# Patient Record
Sex: MALE | Race: WHITE | NOT HISPANIC OR LATINO | Employment: PART TIME | URBAN - NONMETROPOLITAN AREA
[De-identification: names, ages, dates, MRNs, and addresses within clinical notes are randomized per-mention and may not be internally consistent; named-entity substitution may affect disease eponyms.]

---

## 2023-10-12 ENCOUNTER — APPOINTMENT (OUTPATIENT)
Dept: PRIMARY CARE | Facility: CLINIC | Age: 73
End: 2023-10-12
Payer: MEDICARE

## 2023-10-16 ENCOUNTER — OFFICE VISIT (OUTPATIENT)
Dept: PRIMARY CARE | Facility: CLINIC | Age: 73
End: 2023-10-16
Payer: MEDICARE

## 2023-10-16 VITALS
HEIGHT: 67 IN | OXYGEN SATURATION: 98 % | WEIGHT: 212 LBS | SYSTOLIC BLOOD PRESSURE: 106 MMHG | BODY MASS INDEX: 33.27 KG/M2 | HEART RATE: 64 BPM | DIASTOLIC BLOOD PRESSURE: 64 MMHG

## 2023-10-16 DIAGNOSIS — E55.9 VITAMIN D DEFICIENCY: ICD-10-CM

## 2023-10-16 DIAGNOSIS — E78.00 HYPERCHOLESTEROLEMIA: Primary | ICD-10-CM

## 2023-10-16 DIAGNOSIS — J30.9 ALLERGIC RHINITIS, UNSPECIFIED SEASONALITY, UNSPECIFIED TRIGGER: ICD-10-CM

## 2023-10-16 DIAGNOSIS — R35.1 NOCTURIA: ICD-10-CM

## 2023-10-16 DIAGNOSIS — J01.01 ACUTE RECURRENT MAXILLARY SINUSITIS: ICD-10-CM

## 2023-10-16 DIAGNOSIS — D23.5 DERMAL NEVUS OF CHEST: ICD-10-CM

## 2023-10-16 PROBLEM — M51.36 LUMBAR DEGENERATIVE DISC DISEASE: Status: RESOLVED | Noted: 2023-10-16 | Resolved: 2023-10-16

## 2023-10-16 PROBLEM — N45.1 EPIDIDYMITIS, BILATERAL: Status: RESOLVED | Noted: 2023-10-16 | Resolved: 2023-10-16

## 2023-10-16 PROBLEM — M85.88 OSTEOPENIA OF LUMBAR SPINE: Status: ACTIVE | Noted: 2023-10-16

## 2023-10-16 PROBLEM — I80.3 PHLEBITIS OF LEG: Status: RESOLVED | Noted: 2023-10-16 | Resolved: 2023-10-16

## 2023-10-16 PROBLEM — B35.3 TINEA PEDIS OF BOTH FEET: Status: RESOLVED | Noted: 2023-10-16 | Resolved: 2023-10-16

## 2023-10-16 PROBLEM — J01.00 ACUTE MAXILLARY SINUSITIS: Status: ACTIVE | Noted: 2023-10-16

## 2023-10-16 PROBLEM — B35.4 TINEA CORPORIS: Status: RESOLVED | Noted: 2023-10-16 | Resolved: 2023-10-16

## 2023-10-16 PROBLEM — M51.369 LUMBAR DEGENERATIVE DISC DISEASE: Status: RESOLVED | Noted: 2023-10-16 | Resolved: 2023-10-16

## 2023-10-16 PROBLEM — K57.30 DIVERTICULOSIS, SIGMOID: Status: ACTIVE | Noted: 2023-10-16

## 2023-10-16 PROBLEM — B00.1 RECURRENT COLD SORES: Status: ACTIVE | Noted: 2023-10-16

## 2023-10-16 LAB
25(OH)D3 SERPL-MCNC: 41 NG/ML (ref 30–100)
CHOLEST SERPL-MCNC: 242 MG/DL (ref 0–199)
CHOLESTEROL/HDL RATIO: 6
HDLC SERPL-MCNC: 40.3 MG/DL
LDLC SERPL CALC-MCNC: 161 MG/DL
NON HDL CHOLESTEROL: 202 MG/DL (ref 0–149)
PSA SERPL-MCNC: 1.05 NG/ML
TRIGL SERPL-MCNC: 202 MG/DL (ref 0–149)
VLDL: 40 MG/DL (ref 0–40)

## 2023-10-16 PROCEDURE — 1170F FXNL STATUS ASSESSED: CPT | Performed by: FAMILY MEDICINE

## 2023-10-16 PROCEDURE — G0008 ADMIN INFLUENZA VIRUS VAC: HCPCS | Performed by: FAMILY MEDICINE

## 2023-10-16 PROCEDURE — 90662 IIV NO PRSV INCREASED AG IM: CPT | Performed by: FAMILY MEDICINE

## 2023-10-16 PROCEDURE — 1160F RVW MEDS BY RX/DR IN RCRD: CPT | Performed by: FAMILY MEDICINE

## 2023-10-16 PROCEDURE — 1036F TOBACCO NON-USER: CPT | Performed by: FAMILY MEDICINE

## 2023-10-16 PROCEDURE — 99213 OFFICE O/P EST LOW 20 MIN: CPT | Performed by: FAMILY MEDICINE

## 2023-10-16 PROCEDURE — 36415 COLL VENOUS BLD VENIPUNCTURE: CPT

## 2023-10-16 PROCEDURE — 1159F MED LIST DOCD IN RCRD: CPT | Performed by: FAMILY MEDICINE

## 2023-10-16 PROCEDURE — 84153 ASSAY OF PSA TOTAL: CPT

## 2023-10-16 PROCEDURE — G0439 PPPS, SUBSEQ VISIT: HCPCS | Performed by: FAMILY MEDICINE

## 2023-10-16 PROCEDURE — 80061 LIPID PANEL: CPT

## 2023-10-16 PROCEDURE — 82306 VITAMIN D 25 HYDROXY: CPT

## 2023-10-16 RX ORDER — AMOXICILLIN AND CLAVULANATE POTASSIUM 875; 125 MG/1; MG/1
875 TABLET, FILM COATED ORAL 2 TIMES DAILY
Qty: 14 TABLET | Refills: 0 | Status: SHIPPED | OUTPATIENT
Start: 2023-10-16 | End: 2023-10-23

## 2023-10-16 RX ORDER — ACETAMINOPHEN 500 MG
1 TABLET ORAL DAILY
COMMUNITY
Start: 2018-02-09

## 2023-10-16 ASSESSMENT — ENCOUNTER SYMPTOMS
ARTHRALGIAS: 0
ACTIVITY CHANGE: 0
RHINORRHEA: 1
DYSURIA: 0
WHEEZING: 0
BACK PAIN: 0
NAUSEA: 0
ABDOMINAL DISTENTION: 0
SINUS PRESSURE: 1
FREQUENCY: 1
FEVER: 0
SHORTNESS OF BREATH: 0
CONSTIPATION: 0
APNEA: 0
DIFFICULTY URINATING: 0
DIARRHEA: 0
APPETITE CHANGE: 0
CHEST TIGHTNESS: 0
COLOR CHANGE: 0
PALPITATIONS: 0

## 2023-10-16 ASSESSMENT — ACTIVITIES OF DAILY LIVING (ADL)
GROCERY_SHOPPING: INDEPENDENT
TAKING_MEDICATION: INDEPENDENT
DRESSING: INDEPENDENT
MANAGING_FINANCES: INDEPENDENT
DOING_HOUSEWORK: INDEPENDENT
BATHING: INDEPENDENT

## 2023-10-16 ASSESSMENT — PATIENT HEALTH QUESTIONNAIRE - PHQ9
1. LITTLE INTEREST OR PLEASURE IN DOING THINGS: NOT AT ALL
2. FEELING DOWN, DEPRESSED OR HOPELESS: NOT AT ALL
SUM OF ALL RESPONSES TO PHQ9 QUESTIONS 1 AND 2: 0

## 2023-10-16 NOTE — PROGRESS NOTES
"Subjective   Reason for Visit: Yosvany Chase is an 72 y.o. male here for a Medicare Wellness visit.     Past Medical, Surgical, and Family History reviewed and updated in chart.    Reviewed all medications by prescribing practitioner or clinical pharmacist (such as prescriptions, OTCs, herbal therapies and supplements) and documented in the medical record.    HPI  Congestion nose throat 6 weeks  Was ill  Not SOB  Has HX sinusitis sinus issues in past  Has had allergies along time   Sl cough   No sore throat or laryngitis    Patient Care Team:  Erik Jolly DO as PCP - General  Erik Jolly DO as PCP - [employer] PCP     Review of Systems   Constitutional:  Negative for activity change, appetite change and fever.   HENT:  Positive for congestion, postnasal drip, rhinorrhea and sinus pressure. Negative for sneezing.    Eyes:  Negative for visual disturbance.   Respiratory:  Negative for apnea, chest tightness, shortness of breath and wheezing.    Cardiovascular:  Negative for chest pain, palpitations and leg swelling.   Gastrointestinal:  Negative for abdominal distention, constipation, diarrhea and nausea.   Genitourinary:  Positive for frequency. Negative for difficulty urinating, dysuria and urgency.   Musculoskeletal:  Negative for arthralgias, back pain and gait problem.   Skin:  Negative for color change, pallor and rash.       Objective   Vitals:  /64   Pulse 64   Ht 1.702 m (5' 7\")   Wt 96.2 kg (212 lb)   SpO2 98%   BMI 33.20 kg/m²       Physical Exam  General: alert, no apparent distress, good hygiene   HEAD:  Normocephalic, atraumatic    EARS:  EAC patent, TMs normal,   EYES:  sclera white, MIKHAIL, conjunctiva noninjected  NOSE: Nasal passages patent   MOUTH: Pharynx clear, tongue uvula midline, grade 3 airway, good dentition  Neck:  supple, no masses, thyroid non enlarged non nodular, no cervical adenopathy,  Lungs:  no wheezing, no rales , no rhonchi, normal respiratory " pattern, breath sounds not diminished  Heart:  regular rate and  rhythm, no murmur, no ectopy, no S3 or S4, no carotid bruits  Abdomen:  soft NT,BS + ,  no organomegaly, no masses, no bruits  Extremities:  1+ edema, no cyanosis, no clubbing,  2+ posterior tibialis pulse    Psych:  speech fluent, normal affect, normal thought process  Skin:  no rashes, no concerning skin lesions, normal texture, patient with small tan nevus slightly left of midline upper chest approximately 6 mm x 4 mm smooth edge tan homogeneous color not concerning appearing.     Assessment/Plan   Problem List Items Addressed This Visit    None  Patient will have shingles shot at pharmacy after leaving office.  Had flu shot done.  Has had pneumonia vaccine in the past.  Antibiotic for sinusitis prescribed  Courage use of Flonase and azelastine nasal spray.  Both over-the-counter.  Patient does have some mild BPH symptoms AUA score of 4

## 2023-10-18 DIAGNOSIS — E78.00 HYPERCHOLESTEROLEMIA: Primary | ICD-10-CM

## 2024-03-11 ENCOUNTER — OFFICE VISIT (OUTPATIENT)
Dept: PRIMARY CARE | Facility: CLINIC | Age: 74
End: 2024-03-11
Payer: COMMERCIAL

## 2024-03-11 VITALS
SYSTOLIC BLOOD PRESSURE: 112 MMHG | DIASTOLIC BLOOD PRESSURE: 68 MMHG | OXYGEN SATURATION: 96 % | HEART RATE: 73 BPM | BODY MASS INDEX: 32.73 KG/M2 | WEIGHT: 209 LBS

## 2024-03-11 DIAGNOSIS — B35.6 TINEA CRURIS: Primary | ICD-10-CM

## 2024-03-11 DIAGNOSIS — R73.9 ELEVATED BLOOD SUGAR: ICD-10-CM

## 2024-03-11 PROBLEM — J01.00 ACUTE MAXILLARY SINUSITIS: Status: RESOLVED | Noted: 2023-10-16 | Resolved: 2024-03-11

## 2024-03-11 LAB
ANION GAP SERPL CALC-SCNC: 12 MMOL/L (ref 10–20)
BUN SERPL-MCNC: 20 MG/DL (ref 6–23)
CALCIUM SERPL-MCNC: 8.9 MG/DL (ref 8.6–10.3)
CHLORIDE SERPL-SCNC: 105 MMOL/L (ref 98–107)
CO2 SERPL-SCNC: 25 MMOL/L (ref 21–32)
CREAT SERPL-MCNC: 0.85 MG/DL (ref 0.5–1.3)
EGFRCR SERPLBLD CKD-EPI 2021: >90 ML/MIN/1.73M*2
GLUCOSE SERPL-MCNC: 103 MG/DL (ref 74–99)
POTASSIUM SERPL-SCNC: 3.8 MMOL/L (ref 3.5–5.3)
SODIUM SERPL-SCNC: 138 MMOL/L (ref 136–145)

## 2024-03-11 PROCEDURE — 36415 COLL VENOUS BLD VENIPUNCTURE: CPT

## 2024-03-11 PROCEDURE — 1036F TOBACCO NON-USER: CPT | Performed by: FAMILY MEDICINE

## 2024-03-11 PROCEDURE — 80048 BASIC METABOLIC PNL TOTAL CA: CPT

## 2024-03-11 PROCEDURE — 99213 OFFICE O/P EST LOW 20 MIN: CPT | Performed by: FAMILY MEDICINE

## 2024-03-11 PROCEDURE — 83036 HEMOGLOBIN GLYCOSYLATED A1C: CPT

## 2024-03-11 PROCEDURE — 1159F MED LIST DOCD IN RCRD: CPT | Performed by: FAMILY MEDICINE

## 2024-03-11 PROCEDURE — 1160F RVW MEDS BY RX/DR IN RCRD: CPT | Performed by: FAMILY MEDICINE

## 2024-03-11 RX ORDER — KETOCONAZOLE 20 MG/G
CREAM TOPICAL
Qty: 30 G | Refills: 3 | Status: SHIPPED | OUTPATIENT
Start: 2024-03-11

## 2024-03-11 RX ORDER — MULTIVIT-MIN/IRON FUM/FOLIC AC 7.5 MG-4
1 TABLET ORAL DAILY
COMMUNITY

## 2024-03-11 NOTE — PATIENT INSTRUCTIONS
The leg could be arthritis in the knee -   Or the varicose veins  -   Exercise helps -   Consider support stockings -   I do not see signs of a clot       Call [phone] to set up CT of the heart       For the toe - if red and raw and itchy - athletes foot cream  Of hard skin -  use vaseline often       For the groin - use the ketoconazole when flared up to calm it down -   And then use the Gold bond powder often to keep it dry       Try a fiber supplement for the bowels  - Metamucil or Citrucel       I will mail you your test results  -    Or call if urgent issues     Medicare Wellness due in October -   Be sure to call at least 2 months in advance

## 2024-03-11 NOTE — PROGRESS NOTES
Subjective   Patient ID: Yosvany Chase is a 73 y.o. male who presents for Leg Pain (Left knee and pain would radiate down and sometimes radiate up, concerned about a clot? Corn on left small toe.  Can't seem to complete his bowel movements, rash in groin area in the warmer months. Dryness in his sinuses, spots that just appear on his skin in places.).    HPI     Former [facility] pt -     LOV With him was 10/2023 for M[facility]  wellness       Concerns today -     About a month ago - had an issue with his leg a few weeks  No issues walking  - can walk here from home  Was only concerned about if he was having a blood clot       Wondered about getting a coronary CT scan    - has order    Corn on his right toe gets worse every Spring    Rash in his groin off and on  - can control it  - but it keeps coming back     Has not had BS checked in a while     Bowels - not as clean as a BM as he would like -   No incon  Colonoscopy 2018 -WNL     Semi-retired from [employer]      Review of Systems    Objective   /68 (BP Location: Right arm, Patient Position: Sitting, BP Cuff Size: Large adult)   Pulse 73   Wt 94.8 kg (209 lb)   SpO2 96%   BMI 32.73 kg/m²     Physical Exam  Vitals reviewed.   Constitutional:       Appearance: Normal appearance.   Cardiovascular:      Rate and Rhythm: Normal rate and regular rhythm.      Heart sounds: No murmur heard.     No gallop.   Pulmonary:      Effort: Pulmonary effort is normal.      Breath sounds: Normal breath sounds.   Musculoskeletal:         General: Swelling present.      Comments: Non pitting edema of the legs -     Does have a very soft calf       No pain to palpation at all of knee or leg     Does have prom veins posteriorly    Skin:     Comments: Right 5th toe with mild erythema medially    Neurological:      Mental Status: He is alert.         Assessment/Plan   Problem List Items Addressed This Visit    None  Visit Diagnoses         Codes    Tinea cruris    -  Primary B35.6     Relevant Medications    ketoconazole (NIZOral) 2 % cream    Elevated blood sugar     R73.9    Relevant Orders    Basic Metabolic Panel    Hemoglobin A1C          Discussed the recurrent rashes -   Check on sugars   Ketoconazole prn and discussed keeping   Groin dry     Discussed the leg - fine now - may be OA knee or the veins - discussed support stockings -   Discussed no current signs of DVT     Bowels  - try fiber

## 2024-03-12 LAB
EST. AVERAGE GLUCOSE BLD GHB EST-MCNC: 100 MG/DL
HBA1C MFR BLD: 5.1 %

## 2024-05-09 ENCOUNTER — TELEPHONE (OUTPATIENT)
Dept: PRIMARY CARE | Facility: CLINIC | Age: 74
End: 2024-05-09
Payer: COMMERCIAL

## 2024-05-09 NOTE — TELEPHONE ENCOUNTER
I saw you mid march and we did talk about this knee pain I was having.  It is still quite bothersome and it is more so right at the knee than it was when I talked about it in March.  Do I come see you about this?  Should I just go see a specialist? Let me know.

## 2024-05-31 ENCOUNTER — OFFICE VISIT (OUTPATIENT)
Dept: ORTHOPEDIC SURGERY | Facility: CLINIC | Age: 74
End: 2024-05-31
Payer: COMMERCIAL

## 2024-05-31 ENCOUNTER — HOSPITAL ENCOUNTER (OUTPATIENT)
Dept: RADIOLOGY | Facility: HOSPITAL | Age: 74
Discharge: HOME | End: 2024-05-31
Payer: COMMERCIAL

## 2024-05-31 DIAGNOSIS — M25.561 ACUTE BILATERAL KNEE PAIN: ICD-10-CM

## 2024-05-31 DIAGNOSIS — M25.562 ACUTE BILATERAL KNEE PAIN: Primary | ICD-10-CM

## 2024-05-31 DIAGNOSIS — M25.561 ACUTE BILATERAL KNEE PAIN: Primary | ICD-10-CM

## 2024-05-31 DIAGNOSIS — M25.562 ACUTE BILATERAL KNEE PAIN: ICD-10-CM

## 2024-05-31 PROCEDURE — 73564 X-RAY EXAM KNEE 4 OR MORE: CPT | Mod: 50

## 2024-05-31 PROCEDURE — 1036F TOBACCO NON-USER: CPT | Performed by: ORTHOPAEDIC SURGERY

## 2024-05-31 PROCEDURE — 1160F RVW MEDS BY RX/DR IN RCRD: CPT | Performed by: ORTHOPAEDIC SURGERY

## 2024-05-31 PROCEDURE — 73564 X-RAY EXAM KNEE 4 OR MORE: CPT | Mod: BILATERAL PROCEDURE | Performed by: RADIOLOGY

## 2024-05-31 PROCEDURE — 1159F MED LIST DOCD IN RCRD: CPT | Performed by: ORTHOPAEDIC SURGERY

## 2024-05-31 PROCEDURE — 99204 OFFICE O/P NEW MOD 45 MIN: CPT | Performed by: ORTHOPAEDIC SURGERY

## 2024-05-31 NOTE — LETTER
May 31, 2024     Mirlande Low MD  [id number] [facility] [address]    Patient: Yosvany Chase   YOB: 1950   Date of Visit: 5/31/2024       Dear Dr. Mirlande Low MD:    Thank you for referring Yosvany Chase to me for evaluation. Below are my notes for this consultation.  If you have questions, please do not hesitate to call me. I look forward to following your patient along with you.       Sincerely,     Stephen Robb MD      CC: No Recipients  ______________________________________________________________________________________    This is a consultation from Dr. Mirlande Low MD for   Chief Complaint   Patient presents with   • Left Knee - Pain   • Right Knee - Pain       This is a 73 y.o. male who presents for evaluation of bilateral knee pain left worse than right.  Patient states has had pain on and off for a while, this is chronic issue but is only exacerbated.  Complains of sharp pain over the medial knee associated with some instability of the knee.  It has been going on for longer than the left knee.  He has never had any surgeries or injections.  Occasionally takes anti-inflammatories but not often.  Has not used any type of bracing or done physical therapy.  When he is walking he is mostly okay but he notices when he gets up from a seated position he has a feeling of instability.    Physical Exam    There has been no interval change in this patient's past medical, surgical, medications, allergies, family history or social history since the most recent visit to a provider within our department. 14 point review of systems was performed, reviewed, and negative except for pertinent positives documented in the history of present illness.     Constitutional: well developed, well nourished male in no acute distress  Psychiatric: normal mood, appropriate affect  Eyes: sclera anicteric  HENT: normocephalic/atraumatic  CV: regular rate and rhythm    Respiratory: non labored breathing  Integumentary: no rash  Neurological: moves all extremities    Bilateral knee exam: skin intact no lacerations or abrations. no effusion.  Tender medial joint line. negative log roll negative patellar grind. ROM 0-120. stable to varus and valgus stress at 0 and 30 degrees. negative lachman negative posterior drawer negative charisma. 5/5 ehl/fhl/gs/ta. silt s/s/sp/dp/t. 2+ dp/pt        Xrays were ordered by me, they were reviewed and independently interpreted by me today, they show bilateral knees with minimal degenerative changes, well-maintained joint space    Procedures      Impression/Plan: This is a 73 y.o. male with mild arthritis versus possible degenerative meniscal tear.  I had an in depth discussion with the patient regarding treatment options for arthritis and their relative risks and benefits. We reviewed surgical and nonsurgical option for treatment. Treatments include anti inflammatory medications, physical therapy, weight loss, activity modification, use of assistive devices, injection therapies. We discussed current prescriptions and risks and benefits of continuation of prescription medication as apporpriate. We discussed that arthritis is often progressive over time, an in end stage arthritis surgical interventions can be considered, including arthroplasty. All questions were answered and the patient voiced their understanding.  Discussed the situation with him, I recommend physical therapy and anti-inflammatories as needed.    BMI Readings from Last 1 Encounters:   03/11/24 32.73 kg/m²      Lab Results   Component Value Date    CREATININE 0.85 03/11/2024     Tobacco Use: Medium Risk (3/11/2024)    Patient History    • Smoking Tobacco Use: Former    • Smokeless Tobacco Use: Never    • Passive Exposure: Not on file      Computed MELD 3.0 unavailable. One or more values for this score either were not found within the given timeframe or did not fit some other  "criterion.  Computed MELD-Na unavailable. One or more values for this score either were not found within the given timeframe or did not fit some other criterion.       Lab Results   Component Value Date    HGBA1C 5.1 03/11/2024     No results found for: \"STAPHMRSASCR\"  "

## 2024-06-18 ENCOUNTER — APPOINTMENT (OUTPATIENT)
Dept: ORTHOPEDIC SURGERY | Facility: CLINIC | Age: 74
End: 2024-06-18
Payer: COMMERCIAL

## 2024-10-07 ENCOUNTER — APPOINTMENT (OUTPATIENT)
Dept: PRIMARY CARE | Facility: CLINIC | Age: 74
End: 2024-10-07
Payer: COMMERCIAL

## 2024-10-07 VITALS
SYSTOLIC BLOOD PRESSURE: 104 MMHG | OXYGEN SATURATION: 97 % | WEIGHT: 209 LBS | DIASTOLIC BLOOD PRESSURE: 60 MMHG | HEART RATE: 52 BPM | HEIGHT: 65 IN | BODY MASS INDEX: 34.82 KG/M2

## 2024-10-07 DIAGNOSIS — Z00.00 ROUTINE GENERAL MEDICAL EXAMINATION AT HEALTH CARE FACILITY: Primary | ICD-10-CM

## 2024-10-07 DIAGNOSIS — Z13.1 SCREENING FOR DIABETES MELLITUS: ICD-10-CM

## 2024-10-07 DIAGNOSIS — Z23 IMMUNIZATION DUE: ICD-10-CM

## 2024-10-07 DIAGNOSIS — Z13.6 SCREENING FOR CARDIOVASCULAR CONDITION: ICD-10-CM

## 2024-10-07 PROBLEM — E66.9 OBESITY: Status: ACTIVE | Noted: 2024-10-07

## 2024-10-07 PROBLEM — D49.2 SKIN NEOPLASM: Status: ACTIVE | Noted: 2024-10-07

## 2024-10-07 PROBLEM — M79.605 PAIN OF LEFT LOWER EXTREMITY: Status: ACTIVE | Noted: 2024-10-07

## 2024-10-07 PROBLEM — L98.9 INFLAMMATORY DERMATOSIS: Status: ACTIVE | Noted: 2024-10-07

## 2024-10-07 LAB
ANION GAP SERPL CALC-SCNC: 16 MMOL/L (ref 10–20)
BUN SERPL-MCNC: 20 MG/DL (ref 6–23)
CALCIUM SERPL-MCNC: 9.2 MG/DL (ref 8.6–10.3)
CHLORIDE SERPL-SCNC: 101 MMOL/L (ref 98–107)
CHOLEST SERPL-MCNC: 237 MG/DL (ref 0–199)
CHOLESTEROL/HDL RATIO: 5.3
CO2 SERPL-SCNC: 28 MMOL/L (ref 21–32)
CREAT SERPL-MCNC: 0.9 MG/DL (ref 0.5–1.3)
EGFRCR SERPLBLD CKD-EPI 2021: >90 ML/MIN/1.73M*2
GLUCOSE SERPL-MCNC: 96 MG/DL (ref 74–99)
HDLC SERPL-MCNC: 45 MG/DL
LDLC SERPL CALC-MCNC: 161 MG/DL
NON HDL CHOLESTEROL: 192 MG/DL (ref 0–149)
POTASSIUM SERPL-SCNC: 4.6 MMOL/L (ref 3.5–5.3)
SODIUM SERPL-SCNC: 140 MMOL/L (ref 136–145)
TRIGL SERPL-MCNC: 155 MG/DL (ref 0–149)
VLDL: 31 MG/DL (ref 0–40)

## 2024-10-07 PROCEDURE — 90662 IIV NO PRSV INCREASED AG IM: CPT | Performed by: FAMILY MEDICINE

## 2024-10-07 PROCEDURE — 80061 LIPID PANEL: CPT

## 2024-10-07 PROCEDURE — 1036F TOBACCO NON-USER: CPT | Performed by: FAMILY MEDICINE

## 2024-10-07 PROCEDURE — 80048 BASIC METABOLIC PNL TOTAL CA: CPT

## 2024-10-07 PROCEDURE — 1159F MED LIST DOCD IN RCRD: CPT | Performed by: FAMILY MEDICINE

## 2024-10-07 PROCEDURE — 1160F RVW MEDS BY RX/DR IN RCRD: CPT | Performed by: FAMILY MEDICINE

## 2024-10-07 PROCEDURE — 3008F BODY MASS INDEX DOCD: CPT | Performed by: FAMILY MEDICINE

## 2024-10-07 PROCEDURE — G0008 ADMIN INFLUENZA VIRUS VAC: HCPCS | Performed by: FAMILY MEDICINE

## 2024-10-07 PROCEDURE — G0439 PPPS, SUBSEQ VISIT: HCPCS | Performed by: FAMILY MEDICINE

## 2024-10-07 PROCEDURE — 1170F FXNL STATUS ASSESSED: CPT | Performed by: FAMILY MEDICINE

## 2024-10-07 ASSESSMENT — ACTIVITIES OF DAILY LIVING (ADL)
BATHING: INDEPENDENT
DRESSING: INDEPENDENT
GROCERY_SHOPPING: INDEPENDENT
MANAGING_FINANCES: INDEPENDENT
DOING_HOUSEWORK: INDEPENDENT
TAKING_MEDICATION: INDEPENDENT

## 2024-10-07 ASSESSMENT — PATIENT HEALTH QUESTIONNAIRE - PHQ9
2. FEELING DOWN, DEPRESSED OR HOPELESS: NOT AT ALL
SUM OF ALL RESPONSES TO PHQ9 QUESTIONS 1 AND 2: 0
1. LITTLE INTEREST OR PLEASURE IN DOING THINGS: NOT AT ALL

## 2024-10-07 NOTE — PATIENT INSTRUCTIONS
Vaccines -   Flu shot today   Think about getting the Prevnar 20,   Shingrix #2,   A covid booster,    a Tetnas/pertussis booster at the pharmacy - spread them out by a few weeks each       For allergy symptoms  -   You can try the over the counter long acting antihistamines :   Claritin  ( loratadine)  or Allegra  (fexofenadine) or Zyrtec (cetirizine) or Xyzal  (levo-cetirizine).   You could also try the steroid nasal sprays:   Fluticasone (Flonase),  Nasacort, or Rhinocort.  Be sure to use them as directed.   If you  do use them,  after you insert them into your nose,   tilt outward toward your eye to avoid nose bleeds.   You may need to stay on these through your allergy season.   You might need only one of these,   but some people need both to control symptoms.    If you try these things for 2 weeks,  and they do not help,  stop the and make an appointment in the office.      Also - for  allergy eye symptoms   (redness, itch , watering)    -  you can try Olopatadine  - an over the counter eye drop that works very well.       You may want to use simply saline often       Plantar Fasciitis Instructions:   1)    NEVER GO BAREFOOT.   2)   It is very important to stretch out your foot before stepping on it if you have been sleeping or sitting for a while.   You may want to keep a belt or towel at the end of your bed to gently stretch your foot before you step on the floor when you wake up.    3)  It is very important to wear shoes with a very padded heel cushion.   Crocs tend to work well, and Oofos sandles.   You could also buy heel pads to insert into your shoes.   4)  Icing the foot often is important.   Using a frozen bottle of water and rolling your foot on it often works well.   5) You may need an anti-inflammatory as well.   6) If all this is not working  - after a few weeks, you may need to see podiatry.       ABOUT MEDICARE PREVENTATIVE APPOINTMENTS:     YOUR YEARLY MEDICARE WELLNESS VISIT IS VERY IMPORTANT.  "     Medicare wants all of their patients to schedule their \"Welcome to Medicare\" visit  when you are in the first 12 months of being on Medicare.    Then every year after that, they want you to schedule your  \"Annual Wellness\"  visit.     These visits have a very specific list of topics I have to cover at the visit,  so you will have paperwork you need to complete before I see you.   Of interest,  there is NOT actually a physical exam as part of this visit.       If you are female,   a Well Woman Exam  (Breast exam and pelvic exam) - are paid for by Medicare every 2 years.   Mammograms are paid for every year.    If you are due for this exam too,  the Medicare wellness and the well woman exam can be done on the same day,  PLEASE TELL THIS TO  WHEN MAKING THE APPOINTMENT.      Some of the Medicare plans ALSO cover a traditional \"physical\" - which has more of the physical exam component.   You may want to find out if your insurance covers that too.       (this is  the code - 78329)  - That can be added to the visit as well.    You would need to tell us.     IT'S VERY HELPFUL IF YOU KNOW WHAT YOUR PLAN COVERS AHEAD OF THE VISIT.      Please check to see what your plan(s) cover.   (Even checking on testing and vaccines that are covered or not helps us greatly!)     At these appointments ,  IF  we cover any of your chronic medical conditions,  medical concerns,  or medications,  I will also be billing nilay  \"E/M  code\" which stands for \"Evaluation and Management\"    -  which is a regular office visit code.    THAT CHARGE MAY BE SUBJECT TO CO-PAYS AND DEDUCTIBLES.     PLEASE DO NOT \"SAVE\" ALL OF YOUR CONCERNS TO GO OVER AT THESE YEARLY WELLNESS VISITS.   YOU SHOULD BE SCHEDULING SEPARATE APPOINTMENTS WHEN YOU HAVE HEALTH CONCERNS TO DISCUSS AND FOR YOUR MEDICATION CHECK UP APPOINTMENTS.        Thank you.          WELL VISIT/PREVENTATIVE VISIT INSTRUCTIONS:        Call [phone] to schedule any testing " ordered at [facility].      For a mammogram, call   [phone] .    Please work on healthy nutrition,  optimal sleep, and regular exercise to feel better.    If you smoke - please quit, and if you drink alcohol, please limit your intake.       Be sure to ask me about any vaccines you may be due for,  and ask me any questions you may have about vaccines.   Generally -  a flu shot is recommended every fall for everyone over 6 months of age,  a pneumonia shot is recommended for anyone 65 and over or who has chronic conditions such as diabetes, heart or lung disease,  the shingles vaccines are recommended for people age 50 and over,   a Tetnas/Pertussis booster is very 10 years (after the childhood set);  the RSV vaccine is for people age 65 and over,  and the COVID vaccines are recommended for everyone, but the boosters are often particular people, so ask me if you qualify.      There may be more vaccines you qualify for depending on your medical conditions, so be sure to ask me about that if you have any chronic illnesses.    Some people have insurance that will pay for the vaccines at the pharmacy, and some your doctors office - so be sure to check with your insurance about the best place to get your vaccines and your coverage of them.     Generally speaking,  good nutrition consists of lean meats, like chicken, fish and turkey (not fried);    plenty of fruits and vegetables (the fresher the better);   Less sugars, saturated fats, and processed foods - especially those made with white flour.   Try to eat the majority of your grain foods  as whole grains.   Keep an eye on your total calories in a day and serving sizes on packaging - this will help you limit your overall intake.    Remember, to lose weight (if you need to), your total calorie intake has to be about 300 - 500 calories less than what you burn in a day.   That number depends on your age, gender and body size.   Some people find a fitbit (calorie  "tracking device) helpful.      Please be sure to see the dentist every 6 months.    Please see the eye doctor no longer than every 2 years.    When you have your Living Will and Medical Power of  papers completed   (they have to be witnessed by 2 non-relatives or notarized to be legally binding),     please keep your originals in a safe place in your home, but make sure all your physicians have copies and that you take copies with you when you go to the hospital.        GETTING TEST RESULTS:    YOU WILL ALWAYS FIND OUT ABOUT ALL YOUR TEST RESULTS FROM ME.  I do not use the \"no news is good news\" policy.   The only time you would not, is if I have told you to get the testing done, and make a follow up appointment to go over it.    Then I will be waiting to talk to you at the visit about your test results.     I have a few different ways I get test results to you  (if its something urgent, we call you)  :       If you have a [employer] card -  I will have your test results on your secureach card within a week.  You should get a phone call telling you when the results are on the card, or just call the card in a week.   If two weeks go  by and you have not heard anything, call the card to see if the results are there,  and if not,  leave me a message.  If you are having trouble using [employer], they have a support line you should call :   [phone];   If you have lost your card, I need to know.     IT'S VERY IMPORTANT THAT YOU CALL TO LISTEN TO YOUR RESULTS, AS IT THEN LETS ME KNOW YOU GOT YOUR RESULTS.        If you get your test results on MY CHART,  you may commonly see your results even before I do.      I will always annotate a message on your results so you know that I saw them and how I feel about them.     If you need some help with MY CHART call the support number at [phone].    IF I mail your results to you,   I need to hear from you if you do not receive them within 2 weeks.      Be " "sure to let me know your preference for getting results.         BILLING FOR PREVENTATIVE VISITS.     Most insurance companies pay for a yearly \"Wellness Check\"  or they may call it a \"Preventative Physical\"   - but it's important to know what your plan covers ahead of the visit.    It's also a good idea to find out what sort of preventative testing they will cover for screening tests - like cholesterol, blood sugar, or colonoscopies. Also, find out which vaccines are covered and if you have any responsibility in paying for them.       If at your Wellness Visit,  we cover anything to do with problems/issues  you are having or  chronic medications/ medical conditions you have,   there will ALSO be a regular office charge that day.     Blood work  or testing obtained that has anything to do with an acute issue or chronic condition is billed under that diagnosis and not screening.       It's a good idea to find out from your insurance what is covered and what is your responsibility for all of the above possible charges.           Most importantly,   if you ever have any questions or concerns that cannot wait until your next visit with me,  you can message me through MY CHART or call the office and leave a voice mail message  (please allow for at least 24 hours for responses for these messages).       Take good care.   Dr Martin              For General Healthy Nutrition    (Remember - NOT A DIET!   Diets are only good for class reunions.)    These are my general good nutrition recommendations for most people.   I use the term \" diet \"  in these instructions to mean your overall nutrition - how you eat and drink.   If we talked about something different during your visit with me,  other than what is written below,  follow that advice instead.       For most people,  eating healthier means getting less added sugar and less processed foods in your diet    The fresher the better.    Added sugar is now a part of the nutrition " label on manufactured food, so you can keep an eye on it easier.    But basically,  foods and beverages  that contain regular sugar and corn syrup are the main sources of added sugars.  Eating as little of these foods as you can is best.   One shocking example of the epidemic of added sugar is soda.    One can of regular soda contains about as much added sugar as 3 regular size doughnuts!     The other issue with processed foods is the amount of processed grains they contain , such as white flour.    This is also something you want to try to limit in your diet.     But, grain products are very important for your nutrition.    Whole grains are better for your body.     Cutting back on white breads, traditional pasta, baked goods, white rice,  and processed cereals will be healthier for you.   The better choices include whole grain breads,  whole wheat pasta,  brown rice, quinoa, barley, steel cut  or rolled oats.   If you eat cereal for breakfast, try to look for one made with whole grains and less sugar.   There are many people who have a problem with gluten, for a large variety of reasons.    Generally,  products made with wheat flour , barley or rye are the primary source of gluten.       Cutting back on saturated fats is important.    You want the majority of the meat that you eat to be chicken, fish or turkey.   Baked or broiled is best -  fried adds too much fat.    There are healthy fats that are important - fat is important for holding down appetite, vitamin absorption and several metabolic processes in the body.  Monounsaturated fats raise HDL (good cholesterol) and lower LDL (bad cholesterol).   Olive oil, peanut oil, nuts, seeds, and avocados are great sources of the good fats.       Ideas are:   Trade sour cream dip for hummus (which is rich in olive oil) or guacamole; use veggies or whole-wheat chips to dip.    Nuts are an excellent source of protein and healthy fats.   Tree nuts are the best kind, such  "as almonds or walnuts.   Just be careful - they are high in calories, so stick to a serving size.  (Most are about 200 calories for a 1/4 cup)      Proteins are very important for your body, and they also hold down your appetite.   Try to have protein with every meal.    These generally are meats, nuts, many beans, legumes, eggs, and dairy.   You will find protein in whole grain products and some green vegetables have a little too.     When you have dairy (if you can - many people are lactose intolerant) try to make it low fat.    Ideas are 1% milk, lowfat yogurt or cheeses, low fat cottage cheese.   I don't generally recommend FAT FREE because they often contain artificial products to improve taste, and the fat helps hold down your appetite.   If you are lactose intolerant, try to see if taking Lactaid before having dairy helps.      Fresh fruits and vegetables are VERY important.  The brighter the better.   Many vegetables are considered \"Free Foods\" - meaning you can eat as much as you want, and it does not matter.  These include tomatoes, cucumbers, celery, peppers, all the various lettuces and kale - to name a few.   Potatoes, corn and peas are starchy, so do have more calories, but are still healthy - you just want to watch the amount of them you eat.       Fruits are full of wonderful nutrition.   They contain natural sugar called fructose, so eating them in moderation is best.   Diabetics may need to pay careful attention to how their body reacts to the sugar.  Some fruits might drastically increase their blood sugar.      Eating smaller meals with a couple of small snacks is better for your metabolism than not eating for long amounts of time  (breakfast is very important).   Trying to avoid large meals is helpful too.    Eating like this helps keep your appetite down and keeps you in burning metabolism rather than storage metabolism so your body will use the calories you eat.       I do not tell people to " stop eating sweets or snack foods - just limit the amounts you have.  The less the better.   Pay attention to serving sizes, and treat them as a treat.        Foods like doughnuts, pop tarts, sugar cereals, cookies  ARE NOT GOOD FOR BREAKFAST.   They are loaded with sugar and will cause you to be hungrier in the day and often not feel well.    Caffeine needs to be limited - no more than 2 servings a day.  Some people can't have any at all.    (if you have any sleep or anxiety issues - stop the caffeine)   Coffee, many teas, many sodas, energy drinks, almost any diet supplement,  and chocolate all contain caffeine.      Water is important.   For most people, 8   x  8 ounces  a day are needed.  This may vary for some health issues.    If you need to be on a low sodium diet, that means looking at labels and eating only 1000 - 2000 mg of sodium a day.    Calcium intake is important.  3 servings of a high calcium food or drink a day is recommended.   This is usually a cup of milk, a cup of yogurt, an ounce of a hard cheese or 1.5 ounces of a soft cheese are the usual servings.   There are other high calcium foods - including soy or almond milk, broccoli,  almonds, dark green leafy vegetable.   Make sure you are not getting more than 1000  - 1200 mg of total calcium a day (unless you have been told you need more by a doctor).    Vitamin D 3 is important to absorb the calcium and for your immune system.   For children, 400 IU a day is recommended.   For adults - 800 - 5000 IU a day  is recommended.  (Often the amount needed is individualized for adults - be sure to ask how much is right for you)    Physical activity is very important for good health.    Finding activities that give you regular exercise is very important for good health.  Try to find exercise you enjoy doing on a regular basis.    30 minutes at least 5 days a week of a good cardiovascular exercise is recommended.   That means something that gets your heart  rate going faster than your usual baseline and you can find yourself breathing harder than usual while you are exercising.  If you have not done any exercise in a long time,  make sure you ask if its safe for you to start,  and be sure to gradually work up to your goal.      If you need to lose weight,  following these recommendations will help you.   And if you are doing all of this and still not losing weight, then its likely just the amount of food you are eating.   Learn to cut back on portion sizes.  Using smaller plates may help.  Healthy weight loss is  only about a pound a week.   You have to remember that whatever you do to lose the weight, you must be prepared to keep it up for life for the weight to stay off.     A lot of people have a lot of luck with using something like a fit bit,  or a program where you keep track of all of your calories that you eat and what you burn off in the day.

## 2024-10-07 NOTE — PROGRESS NOTES
Subjective   Reason for Visit: Yosvany Chase is an 73 y.o. male here for a Medicare Wellness visit and follow up     Past Medical, Surgical, and Family History reviewed and updated in chart.    Reviewed all medications by prescribing practitioner or clinical pharmacist (such as prescriptions, OTCs, herbal therapies and supplements) and documented in the medical record.    HPI    LOV with me 3/2024 (and first with me )       Concerns today  -     Nasal congestion for years  -         OTC nasal spray does not help much          Gets worse with heat   Heats with propane -  And reacts to wood smoke quite a bit     Saw DR Robb about legs -   Knees were ok   Has Plantar Fasciitis  - that effects his calves     Saw Jalen about his hand injury     Rash is better       WAC:   CV conditions and risks:   Did not get cor CT - willing to do that     Last cholesterol level:   2023 - mild high    Last blood sugar level :     2023 - WNL     BMI/weight :   209 lbs/ 34     nutrition :  not bad - wife cooks healthy    exercise :  walks often and active     smoking status:             how long and how much?  :   quit in 1990             Smoked  20 years  - about 1 ppd  Need Screening Lung CT?:    no       Last colonoscopy or colon cancer screen :     2018  - good for 10 years (on chart)   FAMILY HX OF COLON CA?  :     NONE     Prostate symptoms:    NONE   PSA:  10/2023 -  WNL   -   Fine with skipping a year     Hep C screen?  :   HIV screen?  :       vaccines -   FLU:   will take today                      PNEUMONIA:  had 13 and 23                          Last one in 2019                       Prevnar 20 - would like to hold off                      COVID-19: got one                      ZOSTER:  had Shingrix #1                      TETNAS/PERTUSSIS:                     HEP B - ages out                      RSV:  at 76                                 vision -    last appt:    the past year     dentist -    last appt:   the past  "year     alcohol consumption:   occas       LIVING WILL/MEDICAL POA :   not yet             On chart?  :          Patient Care Team:  Mirlande Low MD as PCP - General (Family Medicine)  Mirlande Low MD as PCP - [facility] Medicare Advantage PCP  Stephen Robb MD as Consulting Physician (Orthopaedic Surgery)  Elmer Rao DO as Consulting Physician (Orthopaedic Surgery)     Review of Systems    Objective   Vitals:  /60 (BP Location: Right arm, Patient Position: Sitting, BP Cuff Size: Large adult)   Pulse 52   Ht 1.651 m (5' 5\")   Wt 94.8 kg (209 lb)   SpO2 97%   BMI 34.78 kg/m²       Physical Exam  Vitals reviewed.   Constitutional:       General: He is not in acute distress.     Appearance: Normal appearance.   HENT:      Head: Normocephalic and atraumatic.      Nose: Nose normal.      Mouth/Throat:      Mouth: Mucous membranes are moist.      Pharynx: No posterior oropharyngeal erythema.   Eyes:      Extraocular Movements: Extraocular movements intact.      Conjunctiva/sclera: Conjunctivae normal.      Pupils: Pupils are equal, round, and reactive to light.   Cardiovascular:      Rate and Rhythm: Normal rate and regular rhythm.      Heart sounds: Normal heart sounds. No murmur heard.  Pulmonary:      Effort: Pulmonary effort is normal. No respiratory distress.      Breath sounds: Normal breath sounds. No wheezing.   Musculoskeletal:      Cervical back: No rigidity.   Lymphadenopathy:      Cervical: No cervical adenopathy.   Skin:     General: Skin is warm and dry.      Findings: No rash.   Neurological:      General: No focal deficit present.      Mental Status: He is alert. Mental status is at baseline.   Psychiatric:         Mood and Affect: Mood normal.         Thought Content: Thought content normal.         Assessment & Plan  Routine general medical examination at health care facility         Screening for cardiovascular condition    Orders:    CT cardiac scoring " wo IV contrast; Future    Lipid Panel    Immunization due    Orders:    Flu vaccine, trivalent, preservative free, HIGH-DOSE, age 65y+ (Fluzone)    Screening for diabetes mellitus    Orders:    Basic Metabolic Panel         MCR wellness today   Generally doing well     Education given for sinuses     We discussed at visit any disease processes that were of concern as well as the risks, benefits and instructions of any new medication provided.    See orders and discussion section for information handed to patient on their Clinical Summary.   Patient (and/or caretaker of patient if present)  stated all questions were answered, and they voiced understanding of instructions.

## 2024-11-24 DIAGNOSIS — J01.91 ACUTE RECURRENT SINUSITIS, UNSPECIFIED LOCATION: Primary | ICD-10-CM

## 2024-11-24 RX ORDER — AMOXICILLIN AND CLAVULANATE POTASSIUM 875; 125 MG/1; MG/1
875 TABLET, FILM COATED ORAL 2 TIMES DAILY
Qty: 20 TABLET | Refills: 0 | Status: SHIPPED | OUTPATIENT
Start: 2024-11-24 | End: 2024-12-04

## 2025-02-05 ENCOUNTER — HOSPITAL ENCOUNTER (OUTPATIENT)
Dept: RADIOLOGY | Facility: HOSPITAL | Age: 75
Discharge: HOME | End: 2025-02-05
Payer: MEDICARE

## 2025-02-05 DIAGNOSIS — Z13.6 SCREENING FOR CARDIOVASCULAR CONDITION: ICD-10-CM

## 2025-02-05 PROCEDURE — 75571 CT HRT W/O DYE W/CA TEST: CPT

## 2025-03-03 ENCOUNTER — TELEMEDICINE (OUTPATIENT)
Dept: PRIMARY CARE | Facility: CLINIC | Age: 75
End: 2025-03-03
Payer: MEDICARE

## 2025-03-03 ENCOUNTER — APPOINTMENT (OUTPATIENT)
Dept: PRIMARY CARE | Facility: CLINIC | Age: 75
End: 2025-03-03

## 2025-03-03 DIAGNOSIS — I25.10 CORONARY ARTERIOSCLEROSIS: Primary | ICD-10-CM

## 2025-03-03 PROCEDURE — 1036F TOBACCO NON-USER: CPT | Performed by: FAMILY MEDICINE

## 2025-03-03 PROCEDURE — 1160F RVW MEDS BY RX/DR IN RCRD: CPT | Performed by: FAMILY MEDICINE

## 2025-03-03 PROCEDURE — 1159F MED LIST DOCD IN RCRD: CPT | Performed by: FAMILY MEDICINE

## 2025-03-03 PROCEDURE — 99213 OFFICE O/P EST LOW 20 MIN: CPT | Performed by: FAMILY MEDICINE

## 2025-03-03 RX ORDER — ROSUVASTATIN CALCIUM 10 MG/1
10 TABLET, COATED ORAL DAILY
Qty: 30 TABLET | Refills: 5 | Status: SHIPPED | OUTPATIENT
Start: 2025-03-03 | End: 2025-08-30

## 2025-03-03 RX ORDER — ASPIRIN 81 MG/1
81 TABLET ORAL DAILY
Qty: 30 TABLET | Refills: 11 | Status: SHIPPED | OUTPATIENT
Start: 2025-03-03 | End: 2026-03-03

## 2025-03-03 NOTE — PATIENT INSTRUCTIONS
Like we discussed today - you do have a VERY high Coronary Calcium Score  - over 1400 -   Which means  you likely have a lot of plaque formation on the arteries of your heart.     BUT - it doesn't sound like you are having symptoms that might show signs of a heart attack approaching any time soon.     I am recommending you start taking aspirin 81 mg daily after breakfast to help prevent a heart attack,  and rosuvastatin -  a cholesterol medication - that helps to stabilize the plaque in the arteries and help prevent a heart attack.    If you think you do not feel well on the rosuvastatin - I need to know asap.      If you were to develop worsening chest pains or shortness of breath , or severe tiredness when you are exerting yourself - I need to know asap and we for sure have to get you to cardiology.     Meanwhile - if you want to speak to the cardiologists to see what else they recommend - that's fine.  I placed a referral in your chart.     Call  [phone] to set up that appointment.     The Mediterranean diet has been proven to be the most heart healthy.

## 2025-03-03 NOTE — PROGRESS NOTES
Subjective   Patient ID: Yosvany Chase is a 74 y.o. male who presents for follow up Coronary CT     HPI       Coronary Score of over 1400   2/5/2025    - discussed today       Walks 2 miles to town and back -   No signif breathing issues, tolerates it well   He feels very good       No family history of heart disease in his family       He knows his family will want him to see cardiology       Review of Systems    Objective   There were no vitals taken for this visit.    Physical Exam    NAD     Assessment/Plan   Assessment & Plan  Coronary arteriosclerosis    Orders:    aspirin 81 mg EC tablet; Take 1 tablet (81 mg) by mouth once daily.    rosuvastatin (Crestor) 10 mg tablet; Take 1 tablet (10 mg) by mouth once daily.    Referral to Cardiology; Future      Pt with very high Coronary Calcium score -   BUT no symptoms of angina.     Agreed to start ASA 81 mg a day   And try rosuvastatin 10 mg a day -     Explained to him the meaning behind the Coronary Score  -     Need to pay attention to symptoms -   Discussed what to look for  -     He feels his family would feel better if he saw cardiology - referral placed.

## 2025-03-04 ENCOUNTER — APPOINTMENT (OUTPATIENT)
Dept: PRIMARY CARE | Facility: CLINIC | Age: 75
End: 2025-03-04

## 2025-04-23 ENCOUNTER — OFFICE VISIT (OUTPATIENT)
Dept: CARDIOLOGY | Facility: HOSPITAL | Age: 75
End: 2025-04-23
Payer: MEDICARE

## 2025-04-23 VITALS
SYSTOLIC BLOOD PRESSURE: 130 MMHG | DIASTOLIC BLOOD PRESSURE: 77 MMHG | HEART RATE: 69 BPM | BODY MASS INDEX: 35.26 KG/M2 | WEIGHT: 211.86 LBS | OXYGEN SATURATION: 94 %

## 2025-04-23 DIAGNOSIS — R93.1 ELEVATED CORONARY ARTERY CALCIUM SCORE: ICD-10-CM

## 2025-04-23 DIAGNOSIS — I25.10 CORONARY ARTERIOSCLEROSIS: Primary | ICD-10-CM

## 2025-04-23 LAB
ATRIAL RATE: 69 BPM
P AXIS: 21 DEGREES
P OFFSET: 192 MS
P ONSET: 146 MS
PR INTERVAL: 134 MS
Q ONSET: 213 MS
QRS COUNT: 12 BEATS
QRS DURATION: 80 MS
QT INTERVAL: 372 MS
QTC CALCULATION(BAZETT): 398 MS
QTC FREDERICIA: 389 MS
R AXIS: -36 DEGREES
T AXIS: 38 DEGREES
T OFFSET: 399 MS
VENTRICULAR RATE: 69 BPM

## 2025-04-23 PROCEDURE — 99204 OFFICE O/P NEW MOD 45 MIN: CPT | Performed by: INTERNAL MEDICINE

## 2025-04-23 PROCEDURE — 93005 ELECTROCARDIOGRAM TRACING: CPT | Performed by: INTERNAL MEDICINE

## 2025-04-23 PROCEDURE — 1159F MED LIST DOCD IN RCRD: CPT | Performed by: INTERNAL MEDICINE

## 2025-04-23 PROCEDURE — 99214 OFFICE O/P EST MOD 30 MIN: CPT | Performed by: INTERNAL MEDICINE

## 2025-04-23 NOTE — LETTER
April 23, 2025     Mirlande Low MD  [id number] [facility] [address]    Patient: Yosvany Chase   YOB: 1950   Date of Visit: 4/23/2025       Dear Dr. Mirlande Low MD:    Thank you for referring Yosvany Chase to me for evaluation. Below are my notes for this consultation.  If you have questions, please do not hesitate to call me. I look forward to following your patient along with you.       Sincerely,     Omer Pringle MD      CC: No Recipients  ______________________________________________________________________________________    Referred by Dr. Low for elevated CAC score      History Of Present Illness:    Dear Dr. Low,    As you know, Yosvany Chase is a 74 y.o. male from home with h/o hld presenting today to the [facility] for evaluation of elevated CAC score (1408 2/5/2025).  Reports being fairly active-- works part-time for his sons.  Denies symptoms of chest pain with exertion, but does occasionally feel SOB with heavy exertion.  He does report epigastric discomfort at times with radiation to his chest with associated burping/belching.  This is typically aggravated with some meals as well as with lying flat in bed.  Denies palpitations, LE edema.       Past Medical History:  He has a past medical history of Body mass index (BMI) 34.0-34.9, adult (10/28/2019), Lumbar degenerative disc disease (10/16/2023), Nondisplaced oblique fracture of shaft of left fibula, initial encounter for closed fracture (01/30/2020), Personal history of diseases of the skin and subcutaneous tissue (07/29/2016), Personal history of other diseases of the digestive system (02/09/2018), Personal history of other diseases of the digestive system, Personal history of other infectious and parasitic diseases (07/29/2016), Phlebitis of leg (10/16/2023), and Tinea corporis (10/16/2023).    Past Surgical History:  He has a past surgical  "history that includes Shoulder surgery (02/09/2018) and Colonoscopy (04/11/2018).      Social History:  He reports that he has quit smoking. His smoking use included pipe. He has never used smokeless tobacco. He reports that he does not drink alcohol and does not use drugs.    Family History:  Family History[1]     Allergies:  Patient has no known allergies.    Outpatient Medications:  Current Outpatient Medications   Medication Instructions    aspirin 81 mg, oral, Daily    cholecalciferol (Vitamin D-3) 50 mcg (2,000 unit) capsule 1 capsule, Daily    ketoconazole (NIZOral) 2 % cream Apply twice  a day  As needed to the affected area    multivitamin with minerals tablet 1 tablet, Daily    rosuvastatin (CRESTOR) 10 mg, oral, Daily        Last Recorded Vitals:  Vitals:    04/23/25 0757   BP: 130/77   Pulse: 69   SpO2: 94%   Weight: 96.1 kg (211 lb 13.8 oz)       Physical Exam:  Constitutional: Pleasant, Awake/Alert/Oriented to person place and time.   Head: Atraumatic, Normocephalic  Eyes: EOMI. ANIA  Neck: No JVD  Cardiovascular: Regular rate and rhythm, S1, S2. No extra heart sounds or murmurs  Respiratory: Clear to auscultation bilaterally. No wheezing, rales or rhonchi.   Abdomen: Soft, Nontender. Bowel sounds appreciated  Musculoskeletal: ROM intact. Muscle strength grossly intact upper and lower extremities 5/5.   Neurological: CNII-XII intact. Sensation grossly intact  Extremities: Warm and dry. No acute rashes and lesions  Psychiatric: Appropriate mood and affect       Last Labs:  CBC -  No results found for: \"WBC\", \"HGB\", \"HCT\", \"MCV\", \"PLT\"    CMP -  Lab Results   Component Value Date    CALCIUM 9.2 10/07/2024    PROT 7.4 10/28/2019    ALBUMIN 4.6 10/28/2019    AST 17 10/28/2019    ALT 14 10/28/2019    ALKPHOS 80 10/28/2019    BILITOT 0.5 10/28/2019       LIPID PANEL -   Lab Results   Component Value Date    CHOL 237 (H) 10/07/2024    TRIG 155 (H) 10/07/2024    HDL 45.0 10/07/2024    CHHDL 5.3 10/07/2024    " LDLF 163 (H) 10/28/2019    VLDL 31 10/07/2024    NHDL 192 (H) 10/07/2024       RENAL FUNCTION PANEL -   Lab Results   Component Value Date    GLUCOSE 96 10/07/2024     10/07/2024    K 4.6 10/07/2024     10/07/2024    CO2 28 10/07/2024    ANIONGAP 16 10/07/2024    BUN 20 10/07/2024    CREATININE 0.90 10/07/2024    CALCIUM 9.2 10/07/2024    ALBUMIN 4.6 10/28/2019        Lab Results   Component Value Date    HGBA1C 5.1 03/11/2024       Last Cardiology Tests:  ECG:  ECG today: NSR, HR 69bpm     Cardiac Imaging:  CT cardiac scoring wo IV contrast 02/05/2025  IMPRESSION:  1. Elevated coronary artery calcium score of 1408*.          Lab review: I have personally reviewed the laboratory result(s)   Diagnostic review: I have personally reviewed the result(s) of the EKG and CAC score     Assessment/Plan  Very pleasant 74 y.o. male from home with h/o hld presenting today to the [facility] for evaluation of elevated CAC score (1408 2/5/2025).  Does report epigastric/substernal chest discomfort that radiates to the left chest at times.      Plan:  Suspect chest pain/epigastric pain is a symptom of GERD, however, given recently identified severely elevated CAC score (>1400), recommend that he complete an exercise nuclear stress test for evaluation of stress induced ischemia.     Continue aspirin 81mg daily as well as crestor 10mg daily. Patient does report headaches, mild joint pain, and fatigue over the past few weeks-- ?statin intolerance, however, he also has URI symptoms that may be contributing as well.  If symptoms continue despite resolution of URI can consider switching to alternative statin therapy.     Follow up pending above test results.     Thank you for the opportunity to participate in the care of Mr. Chase.  Please do not hesitate to reach out with any questions.     Cyndee Mills, KAREEM-CNP           [1]  No family history on file.

## 2025-04-23 NOTE — PROGRESS NOTES
Referred by Dr. Low for elevated CAC score      History Of Present Illness:    Dear Dr. Low,    As you know, Yosvany Chase is a 74 y.o. male from home with h/o hld presenting today to the [facility] for evaluation of elevated CAC score (1408 2/5/2025).  Reports being fairly active-- works part-time for his sons.  Denies symptoms of chest pain with exertion, but does occasionally feel SOB with heavy exertion.  He does report epigastric discomfort at times with radiation to his chest with associated burping/belching.  This is typically aggravated with some meals as well as with lying flat in bed.  Denies palpitations, LE edema.       Past Medical History:  He has a past medical history of Body mass index (BMI) 34.0-34.9, adult (10/28/2019), Lumbar degenerative disc disease (10/16/2023), Nondisplaced oblique fracture of shaft of left fibula, initial encounter for closed fracture (01/30/2020), Personal history of diseases of the skin and subcutaneous tissue (07/29/2016), Personal history of other diseases of the digestive system (02/09/2018), Personal history of other diseases of the digestive system, Personal history of other infectious and parasitic diseases (07/29/2016), Phlebitis of leg (10/16/2023), and Tinea corporis (10/16/2023).    Past Surgical History:  He has a past surgical history that includes Shoulder surgery (02/09/2018) and Colonoscopy (04/11/2018).      Social History:  He reports that he has quit smoking. His smoking use included pipe. He has never used smokeless tobacco. He reports that he does not drink alcohol and does not use drugs.    Family History:  Family History[1]     Allergies:  Patient has no known allergies.    Outpatient Medications:  Current Outpatient Medications   Medication Instructions    aspirin 81 mg, oral, Daily    cholecalciferol (Vitamin D-3) 50 mcg (2,000 unit) capsule 1 capsule, Daily    ketoconazole (NIZOral) 2 % cream  "Apply twice  a day  As needed to the affected area    multivitamin with minerals tablet 1 tablet, Daily    rosuvastatin (CRESTOR) 10 mg, oral, Daily        Last Recorded Vitals:  Vitals:    04/23/25 0757   BP: 130/77   Pulse: 69   SpO2: 94%   Weight: 96.1 kg (211 lb 13.8 oz)       Physical Exam:  Constitutional: Pleasant, Awake/Alert/Oriented to person place and time.   Head: Atraumatic, Normocephalic  Eyes: EOMI. ANIA  Neck: No JVD  Cardiovascular: Regular rate and rhythm, S1, S2. No extra heart sounds or murmurs  Respiratory: Clear to auscultation bilaterally. No wheezing, rales or rhonchi.   Abdomen: Soft, Nontender. Bowel sounds appreciated  Musculoskeletal: ROM intact. Muscle strength grossly intact upper and lower extremities 5/5.   Neurological: CNII-XII intact. Sensation grossly intact  Extremities: Warm and dry. No acute rashes and lesions  Psychiatric: Appropriate mood and affect       Last Labs:  CBC -  No results found for: \"WBC\", \"HGB\", \"HCT\", \"MCV\", \"PLT\"    CMP -  Lab Results   Component Value Date    CALCIUM 9.2 10/07/2024    PROT 7.4 10/28/2019    ALBUMIN 4.6 10/28/2019    AST 17 10/28/2019    ALT 14 10/28/2019    ALKPHOS 80 10/28/2019    BILITOT 0.5 10/28/2019       LIPID PANEL -   Lab Results   Component Value Date    CHOL 237 (H) 10/07/2024    TRIG 155 (H) 10/07/2024    HDL 45.0 10/07/2024    CHHDL 5.3 10/07/2024    LDLF 163 (H) 10/28/2019    VLDL 31 10/07/2024    NHDL 192 (H) 10/07/2024       RENAL FUNCTION PANEL -   Lab Results   Component Value Date    GLUCOSE 96 10/07/2024     10/07/2024    K 4.6 10/07/2024     10/07/2024    CO2 28 10/07/2024    ANIONGAP 16 10/07/2024    BUN 20 10/07/2024    CREATININE 0.90 10/07/2024    CALCIUM 9.2 10/07/2024    ALBUMIN 4.6 10/28/2019        Lab Results   Component Value Date    HGBA1C 5.1 03/11/2024       Last Cardiology Tests:  ECG:  ECG today: NSR, HR 69bpm     Cardiac Imaging:  CT cardiac scoring wo IV contrast 02/05/2025  IMPRESSION:  1. " Elevated coronary artery calcium score of 1408*.          Lab review: I have personally reviewed the laboratory result(s)   Diagnostic review: I have personally reviewed the result(s) of the EKG and CAC score     Assessment/Plan   Very pleasant 74 y.o. male from home with h/o hld presenting today to the [facility] for evaluation of elevated CAC score (1408 2/5/2025).  Does report epigastric/substernal chest discomfort that radiates to the left chest at times.      Plan:  Suspect chest pain/epigastric pain is a symptom of GERD, however, given recently identified severely elevated CAC score (>1400), recommend that he complete an exercise nuclear stress test for evaluation of stress induced ischemia.     Continue aspirin 81mg daily as well as crestor 10mg daily. Patient does report headaches, mild joint pain, and fatigue over the past few weeks-- ?statin intolerance, however, he also has URI symptoms that may be contributing as well.  If symptoms continue despite resolution of URI can consider switching to alternative statin therapy.     Follow up pending above test results.     Thank you for the opportunity to participate in the care of Mr. Chase.  Please do not hesitate to reach out with any questions.     Cyndee Mills, APRN-CNP         [1] No family history on file.

## 2025-05-08 ENCOUNTER — HOSPITAL ENCOUNTER (OUTPATIENT)
Dept: RADIOLOGY | Facility: HOSPITAL | Age: 75
Discharge: HOME | End: 2025-05-08
Payer: MEDICARE

## 2025-05-08 ENCOUNTER — HOSPITAL ENCOUNTER (OUTPATIENT)
Dept: CARDIOLOGY | Facility: HOSPITAL | Age: 75
Discharge: HOME | End: 2025-05-08
Payer: MEDICARE

## 2025-05-08 DIAGNOSIS — R93.1 ELEVATED CORONARY ARTERY CALCIUM SCORE: ICD-10-CM

## 2025-05-08 DIAGNOSIS — R94.31 ABNORMAL ELECTROCARDIOGRAM (ECG) (EKG): ICD-10-CM

## 2025-05-08 DIAGNOSIS — I25.10 CORONARY ARTERIOSCLEROSIS: ICD-10-CM

## 2025-05-08 DIAGNOSIS — R07.9 CHEST PAIN, UNSPECIFIED TYPE: ICD-10-CM

## 2025-05-08 DIAGNOSIS — R94.39 ABNORMAL STRESS TEST: Primary | ICD-10-CM

## 2025-05-08 PROCEDURE — A9502 TC99M TETROFOSMIN: HCPCS | Performed by: NURSE PRACTITIONER

## 2025-05-08 PROCEDURE — 93018 CV STRESS TEST I&R ONLY: CPT | Performed by: STUDENT IN AN ORGANIZED HEALTH CARE EDUCATION/TRAINING PROGRAM

## 2025-05-08 PROCEDURE — 93016 CV STRESS TEST SUPVJ ONLY: CPT | Performed by: STUDENT IN AN ORGANIZED HEALTH CARE EDUCATION/TRAINING PROGRAM

## 2025-05-08 PROCEDURE — 93017 CV STRESS TEST TRACING ONLY: CPT

## 2025-05-08 PROCEDURE — 3430000001 HC RX 343 DIAGNOSTIC RADIOPHARMACEUTICALS: Performed by: NURSE PRACTITIONER

## 2025-05-08 PROCEDURE — 78452 HT MUSCLE IMAGE SPECT MULT: CPT

## 2025-05-08 RX ADMIN — TETROFOSMIN 10.9 MILLICURIE: 0.23 INJECTION, POWDER, LYOPHILIZED, FOR SOLUTION INTRAVENOUS at 08:20

## 2025-05-08 RX ADMIN — TETROFOSMIN 33.4 MILLICURIE: 0.23 INJECTION, POWDER, LYOPHILIZED, FOR SOLUTION INTRAVENOUS at 09:33

## 2025-05-13 ENCOUNTER — TELEPHONE (OUTPATIENT)
Dept: CARDIOLOGY | Facility: HOSPITAL | Age: 75
End: 2025-05-13
Payer: MEDICARE

## 2025-05-13 RX ORDER — PRAVASTATIN SODIUM 20 MG/1
20 TABLET ORAL DAILY
Qty: 30 TABLET | Refills: 11 | Status: SHIPPED | OUTPATIENT
Start: 2025-05-13 | End: 2026-05-13

## 2025-05-13 NOTE — TELEPHONE ENCOUNTER
We recommend that he have a cardiac catheterization to definitively assess for obstructive CAD because his stress test was abnormal.  The stress test was completed due to a severely elevated coronary calcium score-- he has been having epigastric pain/substernal CP that he thought was indigestion, however, now with the abnormal stress test his symptoms may be related to anginal equivalent.

## 2025-05-23 ENCOUNTER — HOSPITAL ENCOUNTER (OUTPATIENT)
Facility: HOSPITAL | Age: 75
Setting detail: OUTPATIENT SURGERY
Discharge: HOME | End: 2025-05-23
Attending: INTERNAL MEDICINE | Admitting: INTERNAL MEDICINE
Payer: MEDICARE

## 2025-05-23 VITALS
BODY MASS INDEX: 35.26 KG/M2 | WEIGHT: 211.86 LBS | RESPIRATION RATE: 16 BRPM | DIASTOLIC BLOOD PRESSURE: 60 MMHG | HEART RATE: 55 BPM | OXYGEN SATURATION: 95 % | SYSTOLIC BLOOD PRESSURE: 135 MMHG

## 2025-05-23 DIAGNOSIS — R07.9 CHEST PAIN, UNSPECIFIED TYPE: ICD-10-CM

## 2025-05-23 DIAGNOSIS — I20.9 ANGINA PECTORIS, UNSPECIFIED: ICD-10-CM

## 2025-05-23 DIAGNOSIS — R93.1 ELEVATED CORONARY ARTERY CALCIUM SCORE: ICD-10-CM

## 2025-05-23 DIAGNOSIS — R94.39 ABNORMAL STRESS TEST: ICD-10-CM

## 2025-05-23 DIAGNOSIS — R94.30 ABNORMAL RESULT OF CARDIOVASCULAR FUNCTION STUDY, UNSPECIFIED: ICD-10-CM

## 2025-05-23 LAB
ANION GAP SERPL CALC-SCNC: 13 MMOL/L (ref 10–20)
BUN SERPL-MCNC: 16 MG/DL (ref 6–23)
CALCIUM SERPL-MCNC: 9.2 MG/DL (ref 8.6–10.3)
CHLORIDE SERPL-SCNC: 103 MMOL/L (ref 98–107)
CO2 SERPL-SCNC: 27 MMOL/L (ref 21–32)
CREAT SERPL-MCNC: 0.92 MG/DL (ref 0.5–1.3)
EGFRCR SERPLBLD CKD-EPI 2021: 88 ML/MIN/1.73M*2
ERYTHROCYTE [DISTWIDTH] IN BLOOD BY AUTOMATED COUNT: 13.1 % (ref 11.5–14.5)
GLUCOSE SERPL-MCNC: 90 MG/DL (ref 74–99)
HCT VFR BLD AUTO: 45.5 % (ref 41–52)
HGB BLD-MCNC: 15.7 G/DL (ref 13.5–17.5)
MCH RBC QN AUTO: 31.8 PG (ref 26–34)
MCHC RBC AUTO-ENTMCNC: 34.5 G/DL (ref 32–36)
MCV RBC AUTO: 92 FL (ref 80–100)
NRBC BLD-RTO: 0 /100 WBCS (ref 0–0)
PLATELET # BLD AUTO: 196 X10*3/UL (ref 150–450)
POTASSIUM SERPL-SCNC: 4.7 MMOL/L (ref 3.5–5.3)
RBC # BLD AUTO: 4.93 X10*6/UL (ref 4.5–5.9)
SODIUM SERPL-SCNC: 138 MMOL/L (ref 136–145)
WBC # BLD AUTO: 6.2 X10*3/UL (ref 4.4–11.3)

## 2025-05-23 PROCEDURE — 7100000010 HC PHASE TWO TIME - EACH INCREMENTAL 1 MINUTE: Performed by: INTERNAL MEDICINE

## 2025-05-23 PROCEDURE — 2720000007 HC OR 272 NO HCPCS: Performed by: INTERNAL MEDICINE

## 2025-05-23 PROCEDURE — C1760 CLOSURE DEV, VASC: HCPCS | Performed by: INTERNAL MEDICINE

## 2025-05-23 PROCEDURE — 7100000009 HC PHASE TWO TIME - INITIAL BASE CHARGE: Performed by: INTERNAL MEDICINE

## 2025-05-23 PROCEDURE — 80048 BASIC METABOLIC PNL TOTAL CA: CPT | Performed by: INTERNAL MEDICINE

## 2025-05-23 PROCEDURE — C1894 INTRO/SHEATH, NON-LASER: HCPCS | Performed by: INTERNAL MEDICINE

## 2025-05-23 PROCEDURE — 36415 COLL VENOUS BLD VENIPUNCTURE: CPT | Performed by: INTERNAL MEDICINE

## 2025-05-23 PROCEDURE — 2500000005 HC RX 250 GENERAL PHARMACY W/O HCPCS: Performed by: INTERNAL MEDICINE

## 2025-05-23 PROCEDURE — 93458 L HRT ARTERY/VENTRICLE ANGIO: CPT | Performed by: INTERNAL MEDICINE

## 2025-05-23 PROCEDURE — 99152 MOD SED SAME PHYS/QHP 5/>YRS: CPT | Performed by: INTERNAL MEDICINE

## 2025-05-23 PROCEDURE — 93454 CORONARY ARTERY ANGIO S&I: CPT | Performed by: INTERNAL MEDICINE

## 2025-05-23 PROCEDURE — 85027 COMPLETE CBC AUTOMATED: CPT | Performed by: INTERNAL MEDICINE

## 2025-05-23 PROCEDURE — C1887 CATHETER, GUIDING: HCPCS | Performed by: INTERNAL MEDICINE

## 2025-05-23 PROCEDURE — 2550000001 HC RX 255 CONTRASTS: Performed by: INTERNAL MEDICINE

## 2025-05-23 PROCEDURE — 2500000004 HC RX 250 GENERAL PHARMACY W/ HCPCS (ALT 636 FOR OP/ED): Performed by: INTERNAL MEDICINE

## 2025-05-23 RX ORDER — FENTANYL CITRATE 50 UG/ML
INJECTION, SOLUTION INTRAMUSCULAR; INTRAVENOUS AS NEEDED
Status: DISCONTINUED | OUTPATIENT
Start: 2025-05-23 | End: 2025-05-23 | Stop reason: HOSPADM

## 2025-05-23 RX ORDER — HEPARIN SODIUM 1000 [USP'U]/ML
INJECTION, SOLUTION INTRAVENOUS; SUBCUTANEOUS AS NEEDED
Status: DISCONTINUED | OUTPATIENT
Start: 2025-05-23 | End: 2025-05-23 | Stop reason: HOSPADM

## 2025-05-23 RX ORDER — ISOSORBIDE MONONITRATE 30 MG/1
30 TABLET, EXTENDED RELEASE ORAL DAILY
Qty: 90 TABLET | Refills: 1 | Status: SHIPPED | OUTPATIENT
Start: 2025-05-23

## 2025-05-23 RX ORDER — LIDOCAINE HYDROCHLORIDE 20 MG/ML
INJECTION, SOLUTION INFILTRATION; PERINEURAL AS NEEDED
Status: DISCONTINUED | OUTPATIENT
Start: 2025-05-23 | End: 2025-05-23 | Stop reason: HOSPADM

## 2025-05-23 RX ORDER — SODIUM CHLORIDE 9 MG/ML
INJECTION, SOLUTION INTRAVENOUS CONTINUOUS PRN
Status: COMPLETED | OUTPATIENT
Start: 2025-05-23 | End: 2025-05-23

## 2025-05-23 RX ORDER — MIDAZOLAM HYDROCHLORIDE 1 MG/ML
INJECTION, SOLUTION INTRAMUSCULAR; INTRAVENOUS AS NEEDED
Status: DISCONTINUED | OUTPATIENT
Start: 2025-05-23 | End: 2025-05-23 | Stop reason: HOSPADM

## 2025-05-23 ASSESSMENT — COLUMBIA-SUICIDE SEVERITY RATING SCALE - C-SSRS
6. HAVE YOU EVER DONE ANYTHING, STARTED TO DO ANYTHING, OR PREPARED TO DO ANYTHING TO END YOUR LIFE?: NO
2. HAVE YOU ACTUALLY HAD ANY THOUGHTS OF KILLING YOURSELF?: NO
1. IN THE PAST MONTH, HAVE YOU WISHED YOU WERE DEAD OR WISHED YOU COULD GO TO SLEEP AND NOT WAKE UP?: NO

## 2025-05-23 ASSESSMENT — PAIN SCALES - GENERAL

## 2025-05-23 ASSESSMENT — ENCOUNTER SYMPTOMS
CONSTITUTIONAL NEGATIVE: 1
HEMATOLOGIC/LYMPHATIC NEGATIVE: 1
NEUROLOGICAL NEGATIVE: 1
EYES NEGATIVE: 1
ENDOCRINE NEGATIVE: 1
PSYCHIATRIC NEGATIVE: 1
MUSCULOSKELETAL NEGATIVE: 1
RESPIRATORY NEGATIVE: 1
ALLERGIC/IMMUNOLOGIC NEGATIVE: 1
GASTROINTESTINAL NEGATIVE: 1

## 2025-05-23 NOTE — H&P
History Of Present Illness  Yosvany Chase is a 74 y.o. male from home with h/o hld, elevated CAC score (1408 2/5/2025) presenting today for coronary angiography in the setting of recent abnormal exercise nuclear stress test concerning for prior inferior infarct with possible melina-infarct ischemia as well as progressive substernal chest pain/epigastric pain over the past few months.      Past Medical History  Medical History[1]    Surgical History  Surgical History[2]     Social History  He reports that he has quit smoking. His smoking use included pipe. He has never used smokeless tobacco. He reports that he does not drink alcohol and does not use drugs.    Family History  Family History[3]     Allergies  Patient has no known allergies.    Review of Systems   Constitutional: Negative.    HENT: Negative.     Eyes: Negative.    Respiratory: Negative.     Cardiovascular:  Positive for chest pain.   Gastrointestinal: Negative.    Endocrine: Negative.    Genitourinary: Negative.    Musculoskeletal: Negative.    Skin: Negative.    Allergic/Immunologic: Negative.    Neurological: Negative.    Hematological: Negative.    Psychiatric/Behavioral: Negative.          Physical Exam  Constitutional:       Appearance: Normal appearance.   HENT:      Head: Normocephalic and atraumatic.      Nose: Nose normal.      Mouth/Throat:      Mouth: Mucous membranes are moist.      Pharynx: Oropharynx is clear.   Cardiovascular:      Rate and Rhythm: Normal rate and regular rhythm.      Pulses: Normal pulses.      Heart sounds: Normal heart sounds.   Pulmonary:      Effort: Pulmonary effort is normal.      Breath sounds: Normal breath sounds.   Abdominal:      General: Abdomen is flat. Bowel sounds are normal.      Palpations: Abdomen is soft.   Musculoskeletal:         General: Normal range of motion.      Cervical back: Normal range of motion and neck supple.   Skin:     General: Skin is warm and dry.      Capillary Refill: Capillary  refill takes 2 to 3 seconds.   Neurological:      General: No focal deficit present.      Mental Status: He is alert and oriented to person, place, and time.   Psychiatric:         Mood and Affect: Mood normal.         Behavior: Behavior normal.          Last Recorded Vitals  Blood pressure (!) 146/97, pulse 63, resp. rate 15, weight 96.1 kg (211 lb 13.8 oz), SpO2 97%.    Relevant Results             Assessment & Plan  Abnormal stress test    Elevated coronary artery calcium score    Chest pain      Yosvany Chase is a 74 y.o. male from home with h/o hld, elevated CAC score (1408 2/5/2025) presenting today for coronary angiography in the setting of recent abnormal exercise nuclear stress test concerning for prior inferior infarct with possible melina-infarct ischemia as well as progressive substernal chest pain/epigastric pain over the past few months.       Cyndee Mills, APRN-CNP         [1]   Past Medical History:  Diagnosis Date    Body mass index (BMI) 34.0-34.9, adult 10/28/2019    BMI 34.0-34.9,adult    Hyperlipidemia     Lumbar degenerative disc disease 10/16/2023    Nondisplaced oblique fracture of shaft of left fibula, initial encounter for closed fracture 01/30/2020    Closed nondisplaced oblique fracture of shaft of left fibula, initial encounter    Personal history of diseases of the skin and subcutaneous tissue 07/29/2016    History of dermatitis    Personal history of other diseases of the digestive system 02/09/2018    History of gastroesophageal reflux (GERD)    Personal history of other diseases of the digestive system     History of acute gastritis    Personal history of other infectious and parasitic diseases 07/29/2016    History of tinea cruris    Phlebitis of leg 10/16/2023    Tinea corporis 10/16/2023   [2]   Past Surgical History:  Procedure Laterality Date    COLONOSCOPY  04/11/2018    Complete Colonoscopy    SHOULDER SURGERY  02/09/2018    Shoulder Surgery Right   [3] No family history on  file.

## 2025-05-23 NOTE — Clinical Note
Closure device placed in the right radial artery. Site closed by radial compression system. Deployed By: Ai Sharma [id number] of air

## 2025-05-23 NOTE — SIGNIFICANT EVENT
Discharge instructions reviewed with patient and family with good understanding. New med prescribed and instructed to  at listed pharmacy.

## 2025-05-27 NOTE — H&P (VIEW-ONLY)
[facility]    Referred by Dr. Pringle for CABG Evaluation    Chief Complaint  Cardiac disease assessment    HPI:   Mr. Yosvany Chase is an 74 y.o. male, who is a patient of Dr.Jennifer BRETT Low MD.  I have been asked to see him by Dr. Pringle to evaluate Coronary Artery Disease.      Yosvany Chase has a PMHx significant for hyperlipidemia and elevated CAC score (1408 2/5/2025).  Nuclear stress test abnormal with findings concerning for prior infarct.  [facility] performed 5/23/25.      Medical History[1]    Surgical History[2]    Family History[3]    Social History     Socioeconomic History    Marital status:      Spouse name: Not on file    Number of children: Not on file    Years of education: Not on file    Highest education level: Not on file   Occupational History    Not on file   Tobacco Use    Smoking status: Former     Types: Pipe    Smokeless tobacco: Never   Vaping Use    Vaping status: Never Used   Substance and Sexual Activity    Alcohol use: Never    Drug use: Never    Sexual activity: Defer   Other Topics Concern    Not on file   Social History Narrative    Not on file     Social Drivers of Health     Financial Resource Strain: Not on file   Food Insecurity: Not on file   Transportation Needs: Not on file   Physical Activity: Not on file   Stress: Not on file   Social Connections: Not on file   Intimate Partner Violence: Not on file   Housing Stability: Not on file       RX Allergies[4]    Encounter Medications[5]      Physical Exam:   General: no acute distress  CV: regular rate and rhythm  Resp: symmetrical chest rise and fall, no increased work of breathing  Extremities: moving all extremities  Abdomen: soft non tender, non distended      Lab Results   Component Value Date    WBC 6.2 05/23/2025    HGB 15.7 05/23/2025    HCT 45.5 05/23/2025    MCV 92 05/23/2025     05/23/2025     Lab Results   Component Value Date    GLUCOSE 90 05/23/2025     CALCIUM 9.2 05/23/2025     05/23/2025    K 4.7 05/23/2025    CO2 27 05/23/2025     05/23/2025    BUN 16 05/23/2025    CREATININE 0.92 05/23/2025         Pertinent Diagnostics:    TTE (5/28/25)    Cardiac Catheterization (5/23/25)    CT cardiac score (2/5/25)  1. Elevated coronary artery calcium score of 1408*       Assessment and Plan:   Mr. Yosvany Chase is an 74 y.o. male who has been referred with Coronary Artery Disease.      I had a chance to review all available, pertinent investigations.  My interpretation of these studies is as follows:    Symptomatic multivessel CAD with mildly decreased EF.  Plan is for cabg.      The risks benefits and alternatives were discussed with the patient and include but are not limited to, bleeding, infection, injury to other structures, need for re-operation, arrhythmia, respiratory failure, prolonged intubation, stroke, and death.  These were discussed with the patient in detail, all questions were answered and informed consent was obtained.        ____________________________________________________________  Mei Herr MD  Assistant Professor of Surgery  Division of Cardiac Surgery    [facility]  [facility]            [1]   Past Medical History:  Diagnosis Date    Body mass index (BMI) 34.0-34.9, adult 10/28/2019    BMI 34.0-34.9,adult    Hyperlipidemia     Lumbar degenerative disc disease 10/16/2023    Nondisplaced oblique fracture of shaft of left fibula, initial encounter for closed fracture 01/30/2020    Closed nondisplaced oblique fracture of shaft of left fibula, initial encounter    Personal history of diseases of the skin and subcutaneous tissue 07/29/2016    History of dermatitis    Personal history of other diseases of the digestive system 02/09/2018    History of gastroesophageal reflux (GERD)    Personal history of other diseases of the digestive system     History of acute gastritis    Personal  history of other infectious and parasitic diseases 07/29/2016    History of tinea cruris    Phlebitis of leg 10/16/2023    Tinea corporis 10/16/2023   [2]   Past Surgical History:  Procedure Laterality Date    CARDIAC CATHETERIZATION N/A 5/23/2025    Procedure: LHC, With LV;  Surgeon: Omer Pringle MD;  Location: [facility];  Service: Cardiovascular;  Laterality: N/A;    COLONOSCOPY  04/11/2018    Complete Colonoscopy    SHOULDER SURGERY  02/09/2018    Shoulder Surgery Right   [3] No family history on file.  [4]   Allergies  Allergen Reactions    Crestor [Rosuvastatin] Myalgia   [5]   Outpatient Encounter Medications as of 6/2/2025   Medication Sig Dispense Refill    aspirin 81 mg EC tablet Take 1 tablet (81 mg) by mouth once daily. 30 tablet 11    cholecalciferol (Vitamin D-3) 50 mcg (2,000 unit) capsule Take 1 capsule (50 mcg) by mouth once daily.      isosorbide mononitrate ER (Imdur) 30 mg 24 hr tablet Take 1 tablet (30 mg) by mouth once daily. Do not crush or chew. 90 tablet 1    multivitamin with minerals tablet Take 1 tablet by mouth once daily.      pravastatin (Pravachol) 20 mg tablet Take 1 tablet (20 mg) by mouth once daily. 30 tablet 11    [DISCONTINUED] ketoconazole (NIZOral) 2 % cream Apply twice  a day  As needed to the affected area 30 g 3     No facility-administered encounter medications on file as of 6/2/2025.

## 2025-05-27 NOTE — SIGNIFICANT EVENT
Cath Lab Procedure Call Back  Procedure Date: __5/23/25_____________   Procedure Performed:_____LHC_______________  Physician:__Stefano_____________  Spoke with:_____________________________  Date/Time Contacted: 1st attempt: _________ ___:____ 2nd attempt: _________ ___:____  Phone#:_______________ Unable to reach/Left message:____________  Access Site: Pain______Bleeding______Bruised______Infection______WNL____x__  Dressing Removal Date:______________ Anticoagulation Post Intervention_________  Satisfied with Care:____x____________ D/C Instructions adequate_____xx__________  Follow Up Appointment:___________x__________ Length of Call:__________________  Comments:______________________________________________________________________________________________________________________________________________

## 2025-05-27 NOTE — PROGRESS NOTES
[facility]    Referred by Dr. Pringle for CABG Evaluation    Chief Complaint  Cardiac disease assessment    HPI:   Mr. Yosvany Chase is an 74 y.o. male, who is a patient of Dr.Jennifer BRETT Low MD.  I have been asked to see him by Dr. Pringle to evaluate Coronary Artery Disease.      Yosvany Chase has a PMHx significant for hyperlipidemia and elevated CAC score (1408 2/5/2025).  Nuclear stress test abnormal with findings concerning for prior infarct.  [facility] performed 5/23/25.      Medical History[1]    Surgical History[2]    Family History[3]    Social History     Socioeconomic History    Marital status:      Spouse name: Not on file    Number of children: Not on file    Years of education: Not on file    Highest education level: Not on file   Occupational History    Not on file   Tobacco Use    Smoking status: Former     Types: Pipe    Smokeless tobacco: Never   Vaping Use    Vaping status: Never Used   Substance and Sexual Activity    Alcohol use: Never    Drug use: Never    Sexual activity: Defer   Other Topics Concern    Not on file   Social History Narrative    Not on file     Social Drivers of Health     Financial Resource Strain: Not on file   Food Insecurity: Not on file   Transportation Needs: Not on file   Physical Activity: Not on file   Stress: Not on file   Social Connections: Not on file   Intimate Partner Violence: Not on file   Housing Stability: Not on file       RX Allergies[4]    Encounter Medications[5]      Physical Exam:   General: no acute distress  CV: regular rate and rhythm  Resp: symmetrical chest rise and fall, no increased work of breathing  Extremities: moving all extremities  Abdomen: soft non tender, non distended      Lab Results   Component Value Date    WBC 6.2 05/23/2025    HGB 15.7 05/23/2025    HCT 45.5 05/23/2025    MCV 92 05/23/2025     05/23/2025     Lab Results   Component Value Date    GLUCOSE 90 05/23/2025     CALCIUM 9.2 05/23/2025     05/23/2025    K 4.7 05/23/2025    CO2 27 05/23/2025     05/23/2025    BUN 16 05/23/2025    CREATININE 0.92 05/23/2025         Pertinent Diagnostics:    TTE (5/28/25)    Cardiac Catheterization (5/23/25)    CT cardiac score (2/5/25)  1. Elevated coronary artery calcium score of 1408*       Assessment and Plan:   Mr. Yosvany Chase is an 74 y.o. male who has been referred with Coronary Artery Disease.      I had a chance to review all available, pertinent investigations.  My interpretation of these studies is as follows:    Symptomatic multivessel CAD with mildly decreased EF.  Plan is for cabg.      The risks benefits and alternatives were discussed with the patient and include but are not limited to, bleeding, infection, injury to other structures, need for re-operation, arrhythmia, respiratory failure, prolonged intubation, stroke, and death.  These were discussed with the patient in detail, all questions were answered and informed consent was obtained.        ____________________________________________________________  Mei Herr MD  Assistant Professor of Surgery  Division of Cardiac Surgery    [facility]  [facility]            [1]   Past Medical History:  Diagnosis Date    Body mass index (BMI) 34.0-34.9, adult 10/28/2019    BMI 34.0-34.9,adult    Hyperlipidemia     Lumbar degenerative disc disease 10/16/2023    Nondisplaced oblique fracture of shaft of left fibula, initial encounter for closed fracture 01/30/2020    Closed nondisplaced oblique fracture of shaft of left fibula, initial encounter    Personal history of diseases of the skin and subcutaneous tissue 07/29/2016    History of dermatitis    Personal history of other diseases of the digestive system 02/09/2018    History of gastroesophageal reflux (GERD)    Personal history of other diseases of the digestive system     History of acute gastritis    Personal  history of other infectious and parasitic diseases 07/29/2016    History of tinea cruris    Phlebitis of leg 10/16/2023    Tinea corporis 10/16/2023   [2]   Past Surgical History:  Procedure Laterality Date    CARDIAC CATHETERIZATION N/A 5/23/2025    Procedure: LHC, With LV;  Surgeon: Omer Pringle MD;  Location: [facility] Card[facility];  Service: Cardiovascular;  Laterality: N/A;    COLONOSCOPY  04/11/2018    Complete Colonoscopy    SHOULDER SURGERY  02/09/2018    Shoulder Surgery Right   [3] No family history on file.  [4]   Allergies  Allergen Reactions    Crestor [Rosuvastatin] Myalgia   [5]   Outpatient Encounter Medications as of 6/2/2025   Medication Sig Dispense Refill    aspirin 81 mg EC tablet Take 1 tablet (81 mg) by mouth once daily. 30 tablet 11    cholecalciferol (Vitamin D-3) 50 mcg (2,000 unit) capsule Take 1 capsule (50 mcg) by mouth once daily.      isosorbide mononitrate ER (Imdur) 30 mg 24 hr tablet Take 1 tablet (30 mg) by mouth once daily. Do not crush or chew. 90 tablet 1    multivitamin with minerals tablet Take 1 tablet by mouth once daily.      pravastatin (Pravachol) 20 mg tablet Take 1 tablet (20 mg) by mouth once daily. 30 tablet 11    [DISCONTINUED] ketoconazole (NIZOral) 2 % cream Apply twice  a day  As needed to the affected area 30 g 3     No facility-administered encounter medications on file as of 6/2/2025.

## 2025-05-28 ENCOUNTER — HOSPITAL ENCOUNTER (OUTPATIENT)
Dept: CARDIOLOGY | Facility: HOSPITAL | Age: 75
Discharge: HOME | End: 2025-05-28
Payer: MEDICARE

## 2025-05-28 DIAGNOSIS — R93.1 ELEVATED CORONARY ARTERY CALCIUM SCORE: ICD-10-CM

## 2025-05-28 DIAGNOSIS — R94.39 ABNORMAL STRESS TEST: ICD-10-CM

## 2025-05-28 DIAGNOSIS — R07.9 CHEST PAIN, UNSPECIFIED TYPE: ICD-10-CM

## 2025-05-28 DIAGNOSIS — Z01.810 ENCOUNTER FOR PREPROCEDURAL CARDIOVASCULAR EXAMINATION: ICD-10-CM

## 2025-05-28 LAB
AORTIC VALVE MEAN GRADIENT: 3 MMHG
AORTIC VALVE PEAK VELOCITY: 1.32 M/S
AV PEAK GRADIENT: 7 MMHG
AVA (PEAK VEL): 2.52 CM2
AVA (VTI): 2.88 CM2
EJECTION FRACTION APICAL 4 CHAMBER: 72
EJECTION FRACTION: 70 %
LEFT ATRIUM VOLUME AREA LENGTH INDEX BSA: 21.4 ML/M2
LEFT VENTRICLE INTERNAL DIMENSION DIASTOLE: 3.72 CM (ref 3.5–6)
LEFT VENTRICULAR OUTFLOW TRACT DIAMETER: 2.05 CM
MITRAL VALVE E/A RATIO: 1.07
RIGHT VENTRICLE FREE WALL PEAK S': 12 CM/S
TRICUSPID ANNULAR PLANE SYSTOLIC EXCURSION: 2.4 CM

## 2025-05-28 PROCEDURE — 93306 TTE W/DOPPLER COMPLETE: CPT | Performed by: STUDENT IN AN ORGANIZED HEALTH CARE EDUCATION/TRAINING PROGRAM

## 2025-05-28 PROCEDURE — 93306 TTE W/DOPPLER COMPLETE: CPT

## 2025-06-02 ENCOUNTER — OFFICE VISIT (OUTPATIENT)
Dept: CARDIAC SURGERY | Facility: HOSPITAL | Age: 75
End: 2025-06-02
Payer: MEDICARE

## 2025-06-02 VITALS
HEIGHT: 65 IN | DIASTOLIC BLOOD PRESSURE: 90 MMHG | WEIGHT: 210.8 LBS | BODY MASS INDEX: 35.12 KG/M2 | SYSTOLIC BLOOD PRESSURE: 142 MMHG | HEART RATE: 57 BPM | OXYGEN SATURATION: 98 %

## 2025-06-02 DIAGNOSIS — R94.39 ABNORMAL STRESS TEST: ICD-10-CM

## 2025-06-02 DIAGNOSIS — R07.9 CHEST PAIN WITH HEMODYNAMIC INSTABILITY: ICD-10-CM

## 2025-06-02 DIAGNOSIS — I25.10 ATHEROSCLEROSIS OF NATIVE CORONARY ARTERY OF NATIVE HEART, UNSPECIFIED WHETHER ANGINA PRESENT: Primary | ICD-10-CM

## 2025-06-02 DIAGNOSIS — R93.1 ELEVATED CORONARY ARTERY CALCIUM SCORE: ICD-10-CM

## 2025-06-02 DIAGNOSIS — R07.9 CHEST PAIN, UNSPECIFIED TYPE: ICD-10-CM

## 2025-06-02 DIAGNOSIS — R09.89 CHEST PAIN WITH HEMODYNAMIC INSTABILITY: ICD-10-CM

## 2025-06-02 DIAGNOSIS — R82.90 ABNORMAL URINE FINDING: ICD-10-CM

## 2025-06-02 PROCEDURE — 1159F MED LIST DOCD IN RCRD: CPT | Performed by: STUDENT IN AN ORGANIZED HEALTH CARE EDUCATION/TRAINING PROGRAM

## 2025-06-02 PROCEDURE — 3008F BODY MASS INDEX DOCD: CPT | Performed by: STUDENT IN AN ORGANIZED HEALTH CARE EDUCATION/TRAINING PROGRAM

## 2025-06-02 PROCEDURE — 99215 OFFICE O/P EST HI 40 MIN: CPT | Performed by: STUDENT IN AN ORGANIZED HEALTH CARE EDUCATION/TRAINING PROGRAM

## 2025-06-02 PROCEDURE — 1126F AMNT PAIN NOTED NONE PRSNT: CPT | Performed by: STUDENT IN AN ORGANIZED HEALTH CARE EDUCATION/TRAINING PROGRAM

## 2025-06-02 PROCEDURE — 99205 OFFICE O/P NEW HI 60 MIN: CPT | Performed by: STUDENT IN AN ORGANIZED HEALTH CARE EDUCATION/TRAINING PROGRAM

## 2025-06-02 ASSESSMENT — ENCOUNTER SYMPTOMS
LOSS OF SENSATION IN FEET: 0
DEPRESSION: 0
OCCASIONAL FEELINGS OF UNSTEADINESS: 0

## 2025-06-02 ASSESSMENT — PAIN SCALES - GENERAL: PAINLEVEL_OUTOF10: 0-NO PAIN

## 2025-06-03 PROBLEM — I25.10 ATHEROSCLEROSIS OF NATIVE CORONARY ARTERY OF NATIVE HEART: Status: ACTIVE | Noted: 2025-06-02

## 2025-06-13 ENCOUNTER — CLINICAL SUPPORT (OUTPATIENT)
Dept: PREADMISSION TESTING | Facility: HOSPITAL | Age: 75
End: 2025-06-13
Payer: MEDICARE

## 2025-06-20 ENCOUNTER — PRE-ADMISSION TESTING (OUTPATIENT)
Dept: PREADMISSION TESTING | Facility: HOSPITAL | Age: 75
End: 2025-06-20
Payer: MEDICARE

## 2025-06-20 ENCOUNTER — HOSPITAL ENCOUNTER (OUTPATIENT)
Dept: RADIOLOGY | Facility: HOSPITAL | Age: 75
Discharge: HOME | End: 2025-06-20
Payer: MEDICARE

## 2025-06-20 ENCOUNTER — HOSPITAL ENCOUNTER (OUTPATIENT)
Dept: VASCULAR MEDICINE | Facility: HOSPITAL | Age: 75
Discharge: HOME | End: 2025-06-20
Payer: MEDICARE

## 2025-06-20 ENCOUNTER — APPOINTMENT (OUTPATIENT)
Dept: LAB | Facility: HOSPITAL | Age: 75
End: 2025-06-20
Payer: MEDICARE

## 2025-06-20 ENCOUNTER — DOCUMENTATION (OUTPATIENT)
Dept: PREADMISSION TESTING | Facility: HOSPITAL | Age: 75
End: 2025-06-20

## 2025-06-20 VITALS
WEIGHT: 202.82 LBS | DIASTOLIC BLOOD PRESSURE: 59 MMHG | HEIGHT: 65 IN | OXYGEN SATURATION: 97 % | HEART RATE: 56 BPM | SYSTOLIC BLOOD PRESSURE: 134 MMHG | RESPIRATION RATE: 18 BRPM | BODY MASS INDEX: 33.79 KG/M2 | TEMPERATURE: 97.7 F

## 2025-06-20 DIAGNOSIS — R07.9 CHEST PAIN, UNSPECIFIED TYPE: ICD-10-CM

## 2025-06-20 DIAGNOSIS — I25.10 ATHEROSCLEROSIS OF NATIVE CORONARY ARTERY OF NATIVE HEART, UNSPECIFIED WHETHER ANGINA PRESENT: ICD-10-CM

## 2025-06-20 DIAGNOSIS — R09.89 CHEST PAIN WITH HEMODYNAMIC INSTABILITY: ICD-10-CM

## 2025-06-20 DIAGNOSIS — Z01.818 PREOP TESTING: Primary | ICD-10-CM

## 2025-06-20 DIAGNOSIS — R39.9 LOWER URINARY TRACT SYMPTOMS (LUTS): ICD-10-CM

## 2025-06-20 DIAGNOSIS — R07.9 CHEST PAIN WITH HEMODYNAMIC INSTABILITY: ICD-10-CM

## 2025-06-20 DIAGNOSIS — Z01.818 ENCOUNTER FOR OTHER PREPROCEDURAL EXAMINATION: ICD-10-CM

## 2025-06-20 DIAGNOSIS — R73.9 ELEVATED BLOOD SUGAR: ICD-10-CM

## 2025-06-20 DIAGNOSIS — R06.02 SOB (SHORTNESS OF BREATH) ON EXERTION: ICD-10-CM

## 2025-06-20 LAB
ABO GROUP (TYPE) IN BLOOD: NORMAL
ALBUMIN SERPL BCP-MCNC: 4.1 G/DL (ref 3.4–5)
ALP SERPL-CCNC: 65 U/L (ref 33–136)
ALT SERPL W P-5'-P-CCNC: 15 U/L (ref 10–52)
ANION GAP SERPL CALC-SCNC: 11 MMOL/L (ref 10–20)
ANTIBODY SCREEN: NORMAL
APPEARANCE UR: CLEAR
APTT PPP: 33 SECONDS (ref 26–36)
AST SERPL W P-5'-P-CCNC: 18 U/L (ref 9–39)
BASOPHILS # BLD AUTO: 0.03 X10*3/UL (ref 0–0.1)
BASOPHILS NFR BLD AUTO: 0.5 %
BILIRUB SERPL-MCNC: 0.6 MG/DL (ref 0–1.2)
BILIRUB UR STRIP.AUTO-MCNC: NEGATIVE MG/DL
BUN SERPL-MCNC: 23 MG/DL (ref 6–23)
CALCIUM SERPL-MCNC: 9.1 MG/DL (ref 8.6–10.3)
CHLORIDE SERPL-SCNC: 103 MMOL/L (ref 98–107)
CO2 SERPL-SCNC: 27 MMOL/L (ref 21–32)
COLOR UR: YELLOW
CREAT SERPL-MCNC: 0.87 MG/DL (ref 0.5–1.3)
EGFRCR SERPLBLD CKD-EPI 2021: >90 ML/MIN/1.73M*2
EOSINOPHIL # BLD AUTO: 0.13 X10*3/UL (ref 0–0.4)
EOSINOPHIL NFR BLD AUTO: 2 %
ERYTHROCYTE [DISTWIDTH] IN BLOOD BY AUTOMATED COUNT: 13.2 % (ref 11.5–14.5)
GLUCOSE SERPL-MCNC: 83 MG/DL (ref 74–99)
GLUCOSE UR STRIP.AUTO-MCNC: NORMAL MG/DL
HCT VFR BLD AUTO: 43.4 % (ref 41–52)
HGB BLD-MCNC: 14.1 G/DL (ref 13.5–17.5)
IMM GRANULOCYTES # BLD AUTO: 0.02 X10*3/UL (ref 0–0.5)
IMM GRANULOCYTES NFR BLD AUTO: 0.3 % (ref 0–0.9)
INR PPP: 1.2 (ref 0.9–1.1)
KETONES UR STRIP.AUTO-MCNC: NEGATIVE MG/DL
LEUKOCYTE ESTERASE UR QL STRIP.AUTO: NEGATIVE
LYMPHOCYTES # BLD AUTO: 2.45 X10*3/UL (ref 0.8–3)
LYMPHOCYTES NFR BLD AUTO: 36.9 %
MCH RBC QN AUTO: 30.8 PG (ref 26–34)
MCHC RBC AUTO-ENTMCNC: 32.5 G/DL (ref 32–36)
MCV RBC AUTO: 95 FL (ref 80–100)
MONOCYTES # BLD AUTO: 0.67 X10*3/UL (ref 0.05–0.8)
MONOCYTES NFR BLD AUTO: 10.1 %
NEUTROPHILS # BLD AUTO: 3.34 X10*3/UL (ref 1.6–5.5)
NEUTROPHILS NFR BLD AUTO: 50.2 %
NITRITE UR QL STRIP.AUTO: NEGATIVE
NRBC BLD-RTO: 0 /100 WBCS (ref 0–0)
PH UR STRIP.AUTO: 6 [PH]
PLATELET # BLD AUTO: 199 X10*3/UL (ref 150–450)
POTASSIUM SERPL-SCNC: 4.4 MMOL/L (ref 3.5–5.3)
PROT SERPL-MCNC: 6.9 G/DL (ref 6.4–8.2)
PROT UR STRIP.AUTO-MCNC: NEGATIVE MG/DL
PROTHROMBIN TIME: 12.9 SECONDS (ref 9.8–12.4)
RBC # BLD AUTO: 4.58 X10*6/UL (ref 4.5–5.9)
RBC # UR STRIP.AUTO: NEGATIVE MG/DL
RH FACTOR (ANTIGEN D): NORMAL
SODIUM SERPL-SCNC: 137 MMOL/L (ref 136–145)
SP GR UR STRIP.AUTO: 1.02
UROBILINOGEN UR STRIP.AUTO-MCNC: ABNORMAL MG/DL
WBC # BLD AUTO: 6.6 X10*3/UL (ref 4.4–11.3)

## 2025-06-20 PROCEDURE — 86900 BLOOD TYPING SEROLOGIC ABO: CPT

## 2025-06-20 PROCEDURE — 81003 URINALYSIS AUTO W/O SCOPE: CPT | Performed by: PHYSICIAN ASSISTANT

## 2025-06-20 PROCEDURE — 70450 CT HEAD/BRAIN W/O DYE: CPT | Performed by: RADIOLOGY

## 2025-06-20 PROCEDURE — 86140 C-REACTIVE PROTEIN: CPT

## 2025-06-20 PROCEDURE — 70450 CT HEAD/BRAIN W/O DYE: CPT

## 2025-06-20 PROCEDURE — 85025 COMPLETE CBC W/AUTO DIFF WBC: CPT

## 2025-06-20 PROCEDURE — 83036 HEMOGLOBIN GLYCOSYLATED A1C: CPT

## 2025-06-20 PROCEDURE — 85610 PROTHROMBIN TIME: CPT

## 2025-06-20 PROCEDURE — 87081 CULTURE SCREEN ONLY: CPT | Mod: AHULAB | Performed by: PHYSICIAN ASSISTANT

## 2025-06-20 PROCEDURE — 71046 X-RAY EXAM CHEST 2 VIEWS: CPT

## 2025-06-20 PROCEDURE — 93010 ELECTROCARDIOGRAM REPORT: CPT | Performed by: INTERNAL MEDICINE

## 2025-06-20 PROCEDURE — 85730 THROMBOPLASTIN TIME PARTIAL: CPT

## 2025-06-20 PROCEDURE — 80053 COMPREHEN METABOLIC PANEL: CPT

## 2025-06-20 PROCEDURE — 93880 EXTRACRANIAL BILAT STUDY: CPT

## 2025-06-20 PROCEDURE — 93880 EXTRACRANIAL BILAT STUDY: CPT | Performed by: INTERNAL MEDICINE

## 2025-06-20 PROCEDURE — 86923 COMPATIBILITY TEST ELECTRIC: CPT

## 2025-06-20 PROCEDURE — 86901 BLOOD TYPING SEROLOGIC RH(D): CPT

## 2025-06-20 PROCEDURE — 93970 EXTREMITY STUDY: CPT

## 2025-06-20 PROCEDURE — 93970 EXTREMITY STUDY: CPT | Performed by: INTERNAL MEDICINE

## 2025-06-20 PROCEDURE — 86850 RBC ANTIBODY SCREEN: CPT

## 2025-06-20 RX ORDER — CHLORHEXIDINE GLUCONATE ORAL RINSE 1.2 MG/ML
SOLUTION DENTAL
Qty: 475 ML | Refills: 0 | Status: ON HOLD | OUTPATIENT
Start: 2025-06-20 | End: 2025-06-24

## 2025-06-20 ASSESSMENT — ENCOUNTER SYMPTOMS
NECK PAIN: 0
LIMITED RANGE OF MOTION: 0
SINUS CONGESTION: 0
PALPITATIONS: 0
VISUAL CHANGE: 0
ABDOMINAL DISTENTION: 0
NUMBNESS: 0
LIGHT-HEADEDNESS: 0
SHORTNESS OF BREATH: 0
DOUBLE VISION: 0
CONFUSION: 0
NAUSEA: 0
ARTHRALGIAS: 0
DIFFICULTY URINATING: 0
HEMOPTYSIS: 0
EYE PAIN: 0
BRUISES/BLEEDS EASILY: 0
CHILLS: 0
WOUND: 0
EXCESSIVE BLEEDING: 0
MYALGIAS: 0
EYE DISCHARGE: 0
DYSPNEA AT REST: 0
COUGH: 0
UNEXPECTED WEIGHT CHANGE: 0
ABDOMINAL PAIN: 0
DYSURIA: 0
SKIN CHANGES: 0
DYSPNEA WITH EXERTION: 0
NECK STIFFNESS: 0
FEVER: 0
WEAKNESS: 0
WHEEZING: 0
RHINORRHEA: 0
CONSTIPATION: 0
BLOOD IN STOOL: 0
TROUBLE SWALLOWING: 0
DIARRHEA: 0
VOMITING: 0

## 2025-06-20 NOTE — CPM/PAT NURSE NOTE
CPM/PAT Nurse Note      Name: Yosvany Chase (Yosvany Chase)  /Age: 1950/74 y.o.       Medical History[1]    Surgical History[2]    Patient Sexual activity questions deferred to the physician.    Family History[3]    Allergies[4]    Prior to Admission medications    Medication Sig Start Date End Date Taking? Authorizing Provider   aspirin 81 mg EC tablet Take 1 tablet (81 mg) by mouth once daily. 3/3/25 3/3/26  Mirlande Low MD   chlorhexidine (Peridex) 0.12 % solution Swish for 30 seconds and spit 15mL of solution the night before and morning of surgery 25   Mary Villar PA-C   cholecalciferol (Vitamin D-3) 50 mcg (2,000 unit) capsule Take 1 capsule (50 mcg) by mouth once daily. 18   Historical Provider, MD   isosorbide mononitrate ER (Imdur) 30 mg 24 hr tablet Take 1 tablet (30 mg) by mouth once daily. Do not crush or chew. 25   KRZYSZTOF Angel   multivitamin with minerals tablet Take 1 tablet by mouth once daily.    Historical Provider, MD   pravastatin (Pravachol) 20 mg tablet Take 1 tablet (20 mg) by mouth once daily. 25  KRZYSZTOF Angel ROS     DASI Risk Score    No data to display       Caprini DVT Assessment    No data to display       Modified Frailty Index    No data to display       [id number] Stroke Risk Points  Current as of 47 minutes ago        N/A 0 to 9 Points:      Last Change: N/A          The [id number] risk score (Lip GH, et al. 2009. © 2010 [employer]) quantifies the risk of stroke for a patient with atrial fibrillation. For patients without atrial fibrillation or under the age of 18 this score appears as N/A. Higher score values generally indicate higher risk of stroke.        This score is not applicable to this patient. Components are not calculated.          Revised Cardiac Risk Index    No data to display       Apfel Simplified Score    No data to display       Risk Analysis  Index Results This Encounter    No data found in the last 10 encounters.       Prodigy: High Risk  Total Score: 20              Prodigy Age Score      Prodigy Gender Score          ARISCAT Score for Postoperative Pulmonary Complications      Flowsheet Row Pre-Admission Testing from 6/20/2025 in [facility] with Mary Villar PA-C   Age Calculated Score 3 filed at 06/20/2025 1323   Preoperative SpO2 0 filed at 06/20/2025 1323   Respiratory infection in the last month Either upper or lower (i.e., URI, bronchitis, pneumonia), with fever and antibiotic treatment 0 filed at 06/20/2025 1323   Preoperative anemia (Hgb less than 10 g/dl) 0 filed at 06/20/2025 1323   Surgical incision  24 filed at 06/20/2025 1323   Duration of surgery  23 filed at 06/20/2025 1323   Emergency Procedure  0 filed at 06/20/2025 1323   ARISCAT Total Score  50 filed at 06/20/2025 1323          Kevan Perioperative Risk for Myocardial Infarction or Cardiac Arrest (CARLOS MANUEL)    No data to display         Nurse Plan of Action: After Visit Summary (AVS) reviewed and patient verbalized good understanding of medications and NPO instructions.  Pre-op infection prevention measures:  CHG showers and mouthwash reviewed, understanding voiced.  CHG soap given and patient verbalized need to pick CHG mouthwash at their preferred local pharmacy.    After Visit Summary (AVS) reviewed and patient verbalized good understanding of medications and NPO instructions.                   [1]   Past Medical History:  Diagnosis Date    Agatston CAC score, >400 02/05/2025    Total 1408.02: LM 73.15   .9   LCx 791.52   .45    Atherosclerosis of native coronary artery of native heart, unspecified whether angina present     Plan: Coronary Artery Bypass Graft x 3 Vessels; LIMA; RADIAL; VEIN 6/24/25    Cardiology follow-up encounter     Omer Pringle MD LOV 4/23/25    Diverticulosis, sigmoid     H/O echocardiogram 05/28/2025    CONCLUSIONS:   1.  Left ventricular ejection fraction is normal calculated by Noriega's biplane at 70%.   2. Spectral Doppler shows a Grade I (impaired relaxation pattern) of left ventricular diastolic filling with normal left atrial filling pressure.   3. Poorly visualized anatomical structures due to suboptimal image quality.   4. There is normal right ventricular global systolic function.    History of cardiovascular stress test 05/08/2025    1. Note is made of a partially fixed defect along the inferior wall.   Findings are concerning for prior infarct particularly along the basal aspect the inferior wall with moderate melina-infarct ischemia along the mid to distal aspect.   2. The left ventricle is normal in size.   3. Decreased wall motion & thickening particularly along the basal to mid inferior wall with an LV EF estimated at 63%.    Hyperlipidemia     Obesity     S/P cardiac cath 05/23/2025    Vitamin D deficiency    [2]   Past Surgical History:  Procedure Laterality Date    CARDIAC CATHETERIZATION N/A 05/23/2025    Procedure: LHC, With LV;  Surgeon: Omer Pringle MD;  Location: [facility] Cardiac [facility] Lab;  Service: Cardiovascular;  Laterality: N/A;    COLONOSCOPY      ROTATOR CUFF REPAIR Right    [3]   Family History  Problem Relation Name Age of Onset    Atrial fibrillation Other      Prostate cancer Other     [4]   Allergies  Allergen Reactions    Crestor [Rosuvastatin] Myalgia    Vicodin [Hydrocodone-Acetaminophen] Headache and GI Upset

## 2025-06-20 NOTE — PREPROCEDURE INSTRUCTIONS
Medication List            Accurate as of June 20, 2025  1:24 PM. Always use your most recent med list.                aspirin 81 mg EC tablet  Take 1 tablet (81 mg) by mouth once daily.  Medication Adjustments for Surgery: Take on the morning of surgery     chlorhexidine 0.12 % solution  Commonly known as: Peridex  Swish for 30 seconds and spit 15mL of solution the night before and morning of surgery  Medication Adjustments for Surgery: Take/Use as prescribed     cholecalciferol 50 mcg (2,000 units) capsule  Commonly known as: Vitamin D-3  Medication Adjustments for Surgery: Do Not take on the morning of surgery     isosorbide mononitrate ER 30 mg 24 hr tablet  Commonly known as: Imdur  Take 1 tablet (30 mg) by mouth once daily. Do not crush or chew.  Medication Adjustments for Surgery: Take on the morning of surgery     multivitamin with minerals tablet  Additional Medication Adjustments for Surgery: Take last dose 7 days before surgery  Notes to patient: Stop now     pravastatin 20 mg tablet  Commonly known as: Pravachol  Take 1 tablet (20 mg) by mouth once daily.  Medication Adjustments for Surgery: Take/Use as prescribed                            **Concerning above medication instructions, if medication is normally taken at night, continue normal schedule.**  **DO NOT TAKE NIGHT PRIOR AND MORNING OF SURGERY**    CONTACT SURGEON'S OFFICE IF YOU DEVELOP:  * Fever = 100.4 F   * New respiratory symptoms (e.g. cough, shortness of breath, respiratory distress, sore throat)  * Recent loss of taste or smell  *Flu like symptoms such as headache, fatigue or gastrointestinal symptoms  * You develop any open sores, shingles, burning or painful urination   AND/OR:  * You no longer wish to have the surgery.  * Any other personal circumstances change that may lead to the need to cancel or defer this surgery.  *You were admitted to any hospital within one week of your planned procedure.    SMOKING:  *Quitting smoking can  make a huge difference to your health and recovery from surgery.    *If you need help with quitting, call 0-474-QUIT-NOW.    THE DAY OF SURGERY:  *Do not eat any food after midnight the night before surgery.   *You must drink 13.5 ounces of clear liquids (i.e. water, black coffee (no milk or cream), tea, apple juice or electrolyte drinks (gatorade)) 2 hours before your arrival time.  *You may chew gum until 2 hours before your surgery    SURGICAL TIME  *You will be contacted between 2 p.m. and 6 p.m. the business day before your surgery with your arrival time.  *If you haven't received a call by 6pm, call [phone].  *Scheduled surgery times may change and you will be notified if this occurs-check your personal voicemail for any updates.    ON THE MORNING OF SURGERY:  *Wear comfortable, loose fitting clothing.   *Do not use moisturizers, creams, lotions or perfume.  *All jewelry and valuables should be left at home.  *Prosthetic devices such as contact lenses, hearing aids, dentures, eyelash extensions, hairpins and body piercing must be removed before surgery.    BRING WITH YOU:  *Photo ID and insurance card  *Current list of medicines and allergies  *Pacemaker/Defibrillator/Heart stent cards  *CPAP machine and mask  *Slings/splints/crutches  *Copy of your complete Advanced Directive/DHPOA-if applicable  *Neurostimulator implant remote    PARKING AND ARRIVAL:  *Check in at the Main Entrance desk and let them know you are here for surgery.  *You will be directed to the 2nd floor surgical waiting area.    AFTER OUTPATIENT SURGERY:  *A responsible adult MUST accompany you at the time of discharge and stay with you for 24 hours after your surgery.  *You may NOT drive yourself home after surgery.  *You may use a taxi or ride sharing service ([employer], Uber) to return home ONLY if you are accompanied by a friend or family member.  *Instructions for resuming your medications will be provided by your surgeon.       Home  Preoperative Antibacterial Shower     What is a home preoperative antibacterial shower?  This shower is a way of cleaning the skin with a germ killing soap before surgery.  The soap contains chlorhexidine, commonly known as CHG.  CHG is a soap for your skin with germ killing ability.  Let your doctor know if you are allergic to chlorhexidine.    Why do I need to take a preoperative antibacterial shower?  Skin is not sterile.  It is best to try to make your skin as free of germs as possible before surgery.  Proper cleansing with a germ killing soap before surgery can lower the number of germs on your skin.  This helps to reduce the risk of infection at the surgical site.  Following the instructions listed below will help you prepare your skin for surgery.      How do I use the CHG skin cleanser?  Steps:  Begin using your CHG soap five days before your scheduled surgery on ________________________.    Days 1-4 Shower before bed:  Wash your face and genitals with your normal soap and rinse.           2.    Apply the CHG soap to a clean wet washcloth.  Turn the water off or move away                From the water spray to avoid premature rinsing of the CHG soap as you are applying.     3.   Lather your entire body from the neck down.  Do not use on your face or genitals.  4. Pay special attention to the area(s) where your incision(s) will be located unless they are on your face.  Avoid scrubbing your skin too hard.  The important point is to have the CHG soap sit on your skin for 3 minutes.    When the 3 minutes are up, turn on the water and rinse the CHG soap off your body completely.   Pat yourself dry with a clean, freshly-laundered towel.  Dress in clean, freshly laundered night clothes.    Be sure to change bed sheets and blankets at least on the first night of CHG body wash use. May change linens every night of the above protocol for maximum benefit.   Day 5:  Last shower is the morning of surgery: Follow above  Instructions.    NOTE:        *Keep CHG soap out of eyes and ear canals   *DO NOT wash with regular soap on your body after you have used the CHG soap solution  *DO NOT apply powders, lotions, or perfume.  *Deodorant may be used days 1-4, BUT NOT the day of surgery    Who should I contact if I have any questions regarding the use of CHG soap?  Call the [facility], Preadmission Testing at [phone] if you have any questions.              Patient Information: Pre-Operative Infection Prevention Measures     Why did I have my nose, under my arms and groin swabbed?  The purpose of the swab is to identify Staphylococcus aureus inside your nose or on your skin.  The swab was sent to the laboratory for culture.  A positive swab/culture for Staphylococcus aureus is called colonization or carriage.      What is Staphylococcus aureus?  Staphylococcus aureus, also known as “staph”, is a germ found on the skin or in the nose of healthy people.  Sometimes Staphylococcus aureus can get into the body and cause an infection.  This can be minor (such as pimples, boils or other skin problems).  It might also be serious (such as blood infection, pneumonia or a surgical site infection).    What is Staphylococcus aureus colonization or carriage?  Colonization or carriage means that a person has the germ but is not sick from it.  These bacteria can be spread on the hands or when breathing or sneezing.    How is Staphylococcus aureus spread?  It is most often spread by close contact with a person or item that carries it.    What happens if my culture is positive for Staphylococcus aureus?  Your doctor/medical team will use this information to guide any antibiotic treatment which may be necessary.  Regardless of the culture results, we will clean the inside of your nose with a betadine swab just before you have your surgery.      Will I get an infection if I have Staphylococcus aureus in my nose or on my  skin?  Anyone can get an infection with Staphylococcus aureus.  However, the best way to reduce your risk of infection is to follow the instructions provided to you for the use of your CHG soap and dental rinse.        Who should I contact if I have any questions?  Call the [facility], Preadmission Testing at [phone] if you have any questions.          Patient Information: Oral/Dental Rinse  **This is a prescription; pick it up at your preferred local pharmacy **  What is oral/dental rinse?   It is a mouthwash. It is a way of cleaning the mouth with a germ killing solution before your surgery.  The solution contains chlorhexidine, commonly known as CHG.   It is used inside the mouth to kill a bacteria known as Staphylococcus aureus.  Let your doctor know if you are allergic to Chlorhexidine.    Why do I need to use CHG oral/dental rinse?  The CHG oral/dental rinse helps to kill a bacteria in your mouth known a Staphylococcus aureus.     This reduces the risk of infection at the surgical site.      Using your CHG oral/dental rinse  STEPS:  Use your CHG oral/dental rinse after you brush your teeth the night before (at bedtime) and the morning of your surgery.  Follow all directions on your prescription label.    Use the cap on the container to measure 15ml (fill cap to fill line)  Swish (gargle if you can) the mouthwash in your mouth for at least 30 seconds, (do not to swallow) spit out  After you use your CHG rinse, do not rinse your mouth with water, drink or eat.  Please refer to prescription label for the appropriate time to resume oral intake  Dental rinse comes in one size bottle: 473ml ~16oz.  You will have leftover    rinse, discard after this use.    What side effects might I have using the CHG oral/dental rinse?  CHG rinse will stick to plaque on the teeth.  Brush and floss just before use.  Teeth brushing will help avoid staining of plaque during use.    Who should I  contact if I have questions about the CHG oral/dental rinse?  Please call [facility], Preadmission Testing at [phone] if you have any questions          Incentive Spirometer   You were provided with an incentive spirometer in [facility], please follow the below instructions.   You were not provided an incentive spirometer in [facility], please disregard the incentive spirometer instructions  What is an incentive spirometer?  An incentive spirometer is a device used before and after surgery to “exercise” your lungs.  It helps you to take deeper breaths to expand your lungs.  Below is an example of a basic incentive spirometer.  The device you receive may differ slightly but they all function the same.    Why do I need to use an incentive spirometer?  Using your incentive spirometer prepares your lungs for surgery and helps prevent lung problems after surgery.  How do I use my incentive spirometer?  When you're using your incentive spirometer, make sure to breathe through your mouth. If you breathe through your nose, the incentive spirometer won't work properly. You can hold your nose if you have trouble.  If you feel dizzy at any time, stop and rest. Try again at a later time.  Follow the steps below:  Set up your incentive spirometer, expand the flexible tubing and connect to the outlet.  Sit upright in a chair or bed. Hold the incentive spirometer at eye level.   Put the mouthpiece in your mouth and close your lips tightly around it. Slowly breathe out (exhale) completely.  Breathe in (inhale) slowly through your mouth as deeply as you can. As you take a breath, you will see the piston rise inside the large column. While the piston rises, the indicator should move upwards. It should stay in between the 2 arrows (see Figure).  Try to get the piston as high as you can, while keeping the indicator between the arrows.   If the indicator doesn't stay between the arrows, you're breathing either  too fast or too slow.  When you get it as high as you can, hold your breath for 10 seconds, or as long as possible. While you're holding your breath, the piston will slowly fall to the base of the spirometer.  Once the piston reaches the bottom of the spirometer, breathe out slowly through your mouth. Rest for a few seconds.  Repeat 10 times. Try to get the piston to the same level with each breath.  Repeat every hour while awake  You can carefully clean the outside of the mouthpiece with an alcohol wipe or soap and water.        Preoperative Deep Breathing Exercises  Why it is important to do deep breathing exercises before my surgery?  Deep breathing exercises strengthen your breathing muscles.  This helps you to recover after your surgery and decreases the chance of breathing complications.  How are the deep breathing exercises done?  Sit straight with your back supported.  Breathe in deeply and slowly through your nose. Your lower rib cage should expand and your abdomen may move forward.  Hold that breath for 3 to 5 seconds.  Breathe out through pursed lips, slowly and completely.  Rest and repeat 10 times every hour while awake.  Rest longer if you become dizzy or lightheaded.        Preoperative Brain Exercises    What are brain exercises?  A brain exercise is any activity that engages your thinking (cognitive) skills.    What types of activities are considered brain exercises?  Jigsaw puzzles, crossword puzzles, word jumble, memory games, word search, and many more.  Many can be found free online or on your phone via a mobile anastacio.    Why should I do brain exercises before my surgery?  More recent research has shown brain exercise before surgery can lower the risk of postoperative delirium (confusion) which can be especially important for older adults.  Patients who did brain exercises for 5 to 10 hours (total) for the 7-10 days before surgery, cut their risk of postoperative delirium in half up to 1 week  after surgery.

## 2025-06-20 NOTE — CPM/PAT H&P
[facility] Evaluation       Name: Yosvany Chase (Yosvany Chase)  /Age: 1950/74 y.o.       Date of Consult: 25    Referring Provider: Dr. Herr    Surgery, Date, and Length: Coronary Artery Bypass Graft x 3 Vessels; LIMA; RADIAL; VEIN , 25, 385MIN    Yosvany Chase is a 74 y.o. year-old male who presents to the [facility] for perioperative risk assessment prior to surgery.    Patient presents with a primary diagnosis of CAD. CT cardiac score found to be elevated 25 at 1408. [facility] performed 25 which recommended: Refer to CTS for CABG eval for grafts to LAD, RCA +/- OM. Pt wishes to proceed with surgery as planned. He denies any current TALAMANTES, CP, diaphoresis, dizziness or syncope.      This note was created in part upon personal review of patient's medical records.      Patient is scheduled to have Coronary Artery Bypass Graft x 3 Vessels; LIMA; RADIAL; VEIN      Pt denies any past history of anesthetic complications such as PONV, awareness, prolonged sedation, dental damage, aspiration, cardiac arrest, difficult intubation, difficult I.V. access or unexpected hospital admissions.  NO malignant hyperthermia and or pseudocholinesterase deficiency.  No history of blood transfusions     The patient is not a Amish and will accept blood and blood products if medically indicated.   Type and screen sent.     Past Medical History:   Diagnosis Date    Agatston CAC score, >400 2025    Total 1408.02: LM 73.15   .9   LCx 791.52   .45    Atherosclerosis of native coronary artery of native heart, unspecified whether angina present     Plan: Coronary Artery Bypass Graft x 3 Vessels; LIMA; RADIAL; VEIN 25    Cardiology follow-up encounter     Omer Pringle MD LOV 25    Diverticulosis, sigmoid     H/O echocardiogram 2025    CONCLUSIONS:   1. Left ventricular ejection fraction is normal calculated by Noriega's biplane at 70%.   2. Spectral Doppler shows a  Grade I (impaired relaxation pattern) of left ventricular diastolic filling with normal left atrial filling pressure.   3. Poorly visualized anatomical structures due to suboptimal image quality.   4. There is normal right ventricular global systolic function.    History of cardiovascular stress test 05/08/2025    1. Note is made of a partially fixed defect along the inferior wall.   Findings are concerning for prior infarct particularly along the basal aspect the inferior wall with moderate melina-infarct ischemia along the mid to distal aspect.   2. The left ventricle is normal in size.   3. Decreased wall motion & thickening particularly along the basal to mid inferior wall with an LV EF estimated at 63%.    Hyperlipidemia     Obesity     S/P cardiac cath 05/23/2025    Vitamin D deficiency        Past Surgical History:   Procedure Laterality Date    CARDIAC CATHETERIZATION N/A 05/23/2025    Procedure: LHC, With LV;  Surgeon: Omer Pringle MD;  Location: [facility] Cardiac [facility] Lab;  Service: Cardiovascular;  Laterality: N/A;    COLONOSCOPY      ROTATOR CUFF REPAIR Right        Patient Sexual activity questions deferred to the physician.    Family History   Problem Relation Name Age of Onset    Atrial fibrillation Other      Prostate cancer Other       Social History     Socioeconomic History    Marital status:      Spouse name: Not on file    Number of children: Not on file    Years of education: Not on file    Highest education level: Not on file   Occupational History    Not on file   Tobacco Use    Smoking status: Never    Smokeless tobacco: Never   Vaping Use    Vaping status: Never Used   Substance and Sexual Activity    Alcohol use: Never    Drug use: Never    Sexual activity: Defer   Other Topics Concern    Not on file   Social History Narrative    Not on file     Social Drivers of Health     Financial Resource Strain: Not on file   Food Insecurity: Not on file   Transportation Needs: Not on file    Physical Activity: Not on file   Stress: Not on file   Social Connections: Not on file   Intimate Partner Violence: Not on file   Housing Stability: Not on file         Allergies   Allergen Reactions    Crestor [Rosuvastatin] Myalgia    Vicodin [Hydrocodone-Acetaminophen] Headache and GI Upset       Current Outpatient Medications   Medication Instructions    aspirin 81 mg, oral, Daily    chlorhexidine (Peridex) 0.12 % solution Swish for 30 seconds and spit 15mL of solution the night before and morning of surgery    cholecalciferol (Vitamin D-3) 50 mcg (2,000 unit) capsule 1 capsule, Daily    isosorbide mononitrate ER (IMDUR) 30 mg, oral, Daily, Do not crush or chew.    multivitamin with minerals tablet 1 tablet, Daily    pravastatin (PRAVACHOL) 20 mg, oral, Daily           PAT ROS:   Constitutional:    no fever   no chills   no unexpected weight change  Neuro/Psych:    no numbness   no weakness   no light-headedness   no confusion  Eyes:    no discharge   no pain   no vision loss   no diplopia   no visual disturbance  Ears:    no ear pain   no hearing loss   no tinnitus  Nose:    no nasal discharge   no sinus congestion   no epistaxis  Mouth:    no dental issues   no mouth pain   no oral bleeding   no mouth lesions  Throat:    no throat pain   no dysphagia  Neck:    no neck pain   no neck stiffness  Cardio:    Functional 4 Mets. Patient denies SOB walking up 2 flights of stairs   Walking, exercise / weights   no chest pain   no palpitations   no peripheral edema   no dyspnea   no TALAMANTES  Respiratory:    no cough   no wheezing   no hemoptysis   no shortness of breath  Endocrine:    no cold intolerance   no heat intolerance  GI:    no abdominal distention   no abdominal pain   no constipation   no diarrhea   no nausea   no vomiting   no blood in stool  :    no difficulty urinating   no dysuria   no oliguria  Musculoskeletal:    no arthralgias   no myalgias   no decreased ROM  Hematologic:    does not bruise/bleed  easily   no excessive bleeding   no history of blood transfusion   no blood clots  Skin:   no skin changes   no sores/wound   no rash      Physical Exam  Constitutional:       General: He is not in acute distress.     Appearance: Normal appearance. He is not ill-appearing, toxic-appearing or diaphoretic.   HENT:      Head: Normocephalic and atraumatic.      Nose: Nose normal. No congestion or rhinorrhea.      Mouth/Throat:      Mouth: Mucous membranes are moist.      Pharynx: No posterior oropharyngeal erythema.   Eyes:      Extraocular Movements: Extraocular movements intact.      Conjunctiva/sclera: Conjunctivae normal.   Cardiovascular:      Rate and Rhythm: Normal rate and regular rhythm.      Pulses: Normal pulses.      Heart sounds: Normal heart sounds. No murmur heard.     No friction rub. No gallop.   Pulmonary:      Effort: Pulmonary effort is normal. No respiratory distress.      Breath sounds: Normal breath sounds. No stridor. No wheezing, rhonchi or rales.   Abdominal:      General: Bowel sounds are normal. There is no distension.      Palpations: Abdomen is soft. There is no mass.      Tenderness: There is no abdominal tenderness. There is no guarding or rebound.      Hernia: No hernia is present.   Musculoskeletal:         General: No swelling, tenderness, deformity or signs of injury. Normal range of motion.      Cervical back: Normal range of motion and neck supple. No rigidity or tenderness.   Skin:     General: Skin is warm and dry.      Coloration: Skin is not jaundiced or pale.      Findings: No bruising, erythema, lesion or rash.   Neurological:      General: No focal deficit present.      Mental Status: He is alert and oriented to person, place, and time.      Cranial Nerves: No cranial nerve deficit.      Sensory: No sensory deficit.      Motor: No weakness.      Coordination: Coordination normal.   Psychiatric:         Mood and Affect: Mood normal.         Behavior: Behavior normal.       "    PAT AIRWAY:   Airway:     Mallampati::  III    Neck ROM::  Full   No broken teeth, no dentures and no missing teeth              Visit Vitals  /59   Pulse 56   Temp 36.5 °C (97.7 °F) (Temporal)   Resp 18   Ht 1.651 m (5' 5\")   Wt 92 kg (202 lb 13.2 oz)   SpO2 97%   BMI 33.75 kg/m²   Smoking Status Never   BSA 2.05 m²     LABS:  Lab Results   Component Value Date    WBC 6.6 06/20/2025    HGB 14.1 06/20/2025    HCT 43.4 06/20/2025    MCV 95 06/20/2025     06/20/2025      EKG 6/20/25  Sinus master  Otherwise normal EKG  Vent rate = 55 bpm    US carotid 6/20/25  CONCLUSIONS:  Right Carotid: Findings are consistent with less than 50% stenosis of the right proximal internal carotid artery. Laminar flow seen by color Doppler. Right external carotid artery appears patent with no evidence of stenosis. No evidence of hemodynamically significant stenosis of the right common carotid artery. The right vertebral artery is patent with antegrade flow. No evidence of hemodynamically significant stenosis in the right subclavian artery.  Left Carotid: Findings are consistent with less than 50% stenosis of the left proximal internal carotid artery. Laminar flow seen by color Doppler. Left external carotid artery appears patent with no evidence of stenosis. No evidence of hemodynamically significant stenosis of the left common carotid artery. The left vertebral artery is patent with antegrade flow. There are elevated velocities in the left subclavian artery that are suggestive of disease.    ECHO 5/28/25    CONCLUSIONS:   1. Left ventricular ejection fraction is normal calculated by Noriega's biplane at 70%.   2. Spectral Doppler shows a Grade I (impaired relaxation pattern) of left ventricular diastolic filling with normal left atrial filling pressure.   3. Poorly visualized anatomical structures due to suboptimal image quality.   4. There is normal right ventricular global systolic function.    [facility] 5/23/25  Coronary " Lesion Summary:  Vessel       Stenosis     Vessel Segment  LAD        70% stenosis ostial and proximal  LAD        70% stenosis         mid  Circumflex 30% stenosis      proximal  Circumflex 60% stenosis         mid  RCA        80% stenosis      proximal  RCA        95% stenosis         mid  Recommendations:  Maximize medical therapy.  Refer to CTS for CABG eval for grafts to LAD, RCA +/- OM.     ____________________________________________________________________________________  CONCLUSIONS:   1. Left main: no significant angiographic disease.   2. LAD: 70% diffuse ostial-proxiaml stenosis, 70% mid-vessel disease.   3. LCx: 30% proximal disease, 50-60% mid-vessel disease.   4. RCA: 80% proximal disease, 95% mid-vessel stenosis.   5. LVEDP 11mmHg, no aortic stenosis on LV-Ao gradient.         NUCLEAR STRESS TEST; 5/8/2025 10:48 am      INDICATION:  Signs/Symptoms:elevated coronary calcium score, chest pain.      ,I25.10 Atherosclerotic heart disease of native coronary artery  without angina pectoris,R93.1 Abnormal findings on diagnostic imaging  of heart and coronary circulation ...      1. Note is made of a partially fixed defect along the inferior wall.  Findings are concerning for prior infarct particularly along the  basal aspect the inferior wall with moderate melina-infarct ischemia  along the mid to distal aspect.  2. The left ventricle is normal in size.  3. Decreased wall motion and thickening particularly along the basal  to mid inferior wall with an LV EF estimated at 63%.       Assessment and Plan:     74 y.o.  male  scheduled for **[facility] - Coronary Artery Bypass Graft x 3 Vessels; LIMA; RADIAL; VEIN** on 6/24/25 with Dr. Herr for  CAD.   Presents to [facility] today for perioperative risk stratification and optimization      Cardiovascular:  Patient has no active cardiac symptoms.   Patient denies any chest pain, tightness, heaviness, pressure, radiating pain, palpitations, irregular heartbeats, lightheadedness,  cough, congestion, shortness of breath, TALAMANTES, PND, near syncope, weight loss or gain.    METS: 4+  RCRI: 1 point, 6.0% risk for postoperative MACE     HLD- cont stat as prescribed    CAD - cont ASA 81mg an isosorbide mononitrate on dos     Pulmonary:  No pulmonary diagnosis, however patient is at increased risk of perioperative complications secondary to  age > 60, obesity, duration of surgery > 2 hours  Stop Bang score is 2 placing patient at low risk for ESVIN  ARISCAT: >45 points, 42.1% risk of in-hospital postoperative pulmonary complication  PRODIGY: High risk for opioid induced respiratory depression    **Pt provided with deep breathing exercises, incentive spirometer and instructions for use during PAT visit today**    Hematology:  Patient instructed to ambulate as soon as possible postoperatively to decrease thromboembolic risk.   Initiate mechanical DVT prophylaxis as soon as possible and initiate chemical prophylaxis when deemed safe from a bleeding standpoint post surgery.     LABS: CBC, CMP, caog, T&S, T&C, MRSA , A1c, UA flex, CRP, EKG and CXR orered    Followup: LABS and CXR pending    Caprini: 5    Risk assessment complete.  Patient is scheduled for a high surgical risk procedure.        Preoperative medication instructions were provided and reviewed with the patient.  Any additional testing or evaluation was explained to the patient.  Nothing by mouth instructions were discussed and patient's questions were answered prior to conclusion to this encounter.  Patient verbalized understanding of preoperative instructions given in preadmission testing; discharge instructions available in EMR.    This note was dictated by a speech recognition.  Minor errors may have been detected in a speech recognition.        palpitations, irregular heartbeats, lightheadedness, cough, congestion, shortness of breath, TALAMANTES, PND, near syncope, weight loss or gain.    METS: 4+  RCRI: 1 point, 6.0% risk for postoperative MACE     HLD- cont stat as prescribed    CAD - cont ASA 81mg an isosorbide mononitrate on dos     Pulmonary:  No pulmonary diagnosis, however patient is at increased risk of perioperative complications secondary to  age > 60, obesity, duration of surgery > 2 hours  Stop Bang score is 2 placing patient at low risk for ESVIN  ARISCAT: >45 points, 42.1% risk of in-hospital postoperative pulmonary complication  PRODIGY: High risk for opioid induced respiratory depression    **Pt provided with deep breathing exercises, incentive spirometer and instructions for use during PAT visit today**    Hematology:  Patient instructed to ambulate as soon as possible postoperatively to decrease thromboembolic risk.   Initiate mechanical DVT prophylaxis as soon as possible and initiate chemical prophylaxis when deemed safe from a bleeding standpoint post surgery.     LABS: CBC, CMP, caog, T&S, T&C, MRSA , A1c, UA flex, CRP, EKG and CXR orered    Followup: LABS and CXR pending    Addendum 6/23/25:  CBC and CMP and coag reviewed and unremarkable. A1c and CRP not performed by labquest even though labs were ordered.  Add on orders placed 6/23/25 and pending  --CRP unremarkable; A1c unremarkable    Caprini: 5    Risk assessment complete.  Patient is scheduled for a high surgical risk procedure.        Preoperative medication instructions were provided and reviewed with the patient.  Any additional testing or evaluation was explained to the patient.  Nothing by mouth instructions were discussed and patient's questions were answered prior to conclusion to this encounter.  Patient verbalized understanding of preoperative instructions given in preadmission testing; discharge instructions available in EMR.    This note was dictated by a speech recognition.   Minor errors may have been detected in a speech recognition.

## 2025-06-23 ENCOUNTER — ANESTHESIA EVENT (OUTPATIENT)
Dept: OPERATING ROOM | Facility: HOSPITAL | Age: 75
End: 2025-06-23
Payer: MEDICARE

## 2025-06-23 ENCOUNTER — HOSPITAL ENCOUNTER (OUTPATIENT)
Dept: CARDIOLOGY | Facility: HOSPITAL | Age: 75
Discharge: HOME | End: 2025-06-23
Payer: MEDICARE

## 2025-06-23 LAB
ATRIAL RATE: 55 BPM
CRP SERPL-MCNC: 0.48 MG/DL
EST. AVERAGE GLUCOSE BLD GHB EST-MCNC: 103 MG/DL
HBA1C MFR BLD: 5.2 % (ref ?–5.7)
P AXIS: 45 DEGREES
P OFFSET: 206 MS
P ONSET: 151 MS
PR INTERVAL: 148 MS
Q ONSET: 225 MS
QRS COUNT: 9 BEATS
QRS DURATION: 88 MS
QT INTERVAL: 404 MS
QTC CALCULATION(BAZETT): 386 MS
QTC FREDERICIA: 392 MS
R AXIS: -14 DEGREES
T AXIS: 35 DEGREES
T OFFSET: 427 MS
VENTRICULAR RATE: 55 BPM

## 2025-06-23 PROCEDURE — 93005 ELECTROCARDIOGRAM TRACING: CPT

## 2025-06-23 NOTE — PROGRESS NOTES
Pharmacy Medication History Review    Yosvany Chase is a 74 y.o. male who is planned to be admitted for Atherosclerosis of native coronary artery of native heart. Pharmacy called the patient prior to their scheduled procedure and reviewed the patient's [id number] medications for accuracy.    Medications ADDED:  NONE   Medications CHANGED:  NONE   Medications REMOVED:   NONE     Please review updated prior to admission medication list and comments regarding how patient may be taking medications differently by going to Admission tab --> Admission Orders --> Admit Orders / Review prior to admission medications.     Preferred pharmacy, last doses of medications, and allergies to be confirmed with patient by nursing the day of procedure.     Sources used to complete the med history include:  [employer]  Pharmacy dispense history  Patient Interview Good historian  Chart Review  Care Everywhere   6/20/25 pre-admission testing note     Below are additional concerns with the patient's PTA list.  NONE     Ken Carl [facility]   Please reach out via Secure Chat for questions

## 2025-06-24 ENCOUNTER — ANESTHESIA (OUTPATIENT)
Dept: OPERATING ROOM | Facility: HOSPITAL | Age: 75
End: 2025-06-24
Payer: MEDICARE

## 2025-06-24 ENCOUNTER — APPOINTMENT (OUTPATIENT)
Dept: RADIOLOGY | Facility: HOSPITAL | Age: 75
DRG: 303 | End: 2025-06-24
Payer: MEDICARE

## 2025-06-24 ENCOUNTER — APPOINTMENT (OUTPATIENT)
Dept: CARDIOLOGY | Facility: HOSPITAL | Age: 75
DRG: 303 | End: 2025-06-24
Payer: MEDICARE

## 2025-06-24 ENCOUNTER — HOSPITAL ENCOUNTER (INPATIENT)
Dept: OPERATING ROOM | Facility: HOSPITAL | Age: 75
Discharge: HOME | DRG: 303 | End: 2025-06-24
Payer: MEDICARE

## 2025-06-24 ENCOUNTER — HOSPITAL ENCOUNTER (INPATIENT)
Facility: HOSPITAL | Age: 75
End: 2025-06-24
Attending: STUDENT IN AN ORGANIZED HEALTH CARE EDUCATION/TRAINING PROGRAM | Admitting: STUDENT IN AN ORGANIZED HEALTH CARE EDUCATION/TRAINING PROGRAM
Payer: MEDICARE

## 2025-06-24 DIAGNOSIS — Z95.1 S/P CABG (CORONARY ARTERY BYPASS GRAFT): ICD-10-CM

## 2025-06-24 DIAGNOSIS — I25.84 CORONARY ARTERY DISEASE DUE TO CALCIFIED CORONARY LESION: ICD-10-CM

## 2025-06-24 DIAGNOSIS — I25.10 CORONARY ARTERY DISEASE DUE TO CALCIFIED CORONARY LESION: ICD-10-CM

## 2025-06-24 DIAGNOSIS — I25.10 ATHEROSCLEROSIS OF NATIVE CORONARY ARTERY OF NATIVE HEART, UNSPECIFIED WHETHER ANGINA PRESENT: Primary | ICD-10-CM

## 2025-06-24 DIAGNOSIS — I20.81 ANGINA PECTORIS WITH CORONARY MICROVASCULAR DYSFUNCTION: ICD-10-CM

## 2025-06-24 DIAGNOSIS — I25.118 ATHEROSCLEROTIC HEART DISEASE OF NATIVE CORONARY ARTERY WITH OTHER FORMS OF ANGINA PECTORIS: ICD-10-CM

## 2025-06-24 DIAGNOSIS — D64.9 POSTOPERATIVE ANEMIA: ICD-10-CM

## 2025-06-24 DIAGNOSIS — R26.81 UNSTEADY GAIT: ICD-10-CM

## 2025-06-24 DIAGNOSIS — Z95.1 S/P CABG X 3: ICD-10-CM

## 2025-06-24 LAB
ABO GROUP (TYPE) IN BLOOD: NORMAL
ACT BLD: 112 SEC (ref 82–174)
ACT BLD: 123 SEC (ref 82–174)
ACT BLD: 622 SEC (ref 82–174)
ACT BLD: 663 SEC (ref 82–174)
ACT BLD: 781 SEC (ref 82–174)
ACT BLD: 936 SEC (ref 82–174)
ALBUMIN SERPL BCP-MCNC: 2.7 G/DL (ref 3.4–5)
ANION GAP BLDA CALCULATED.4IONS-SCNC: 10 MMO/L (ref 10–25)
ANION GAP BLDA CALCULATED.4IONS-SCNC: 11 MMO/L (ref 10–25)
ANION GAP BLDA CALCULATED.4IONS-SCNC: 12 MMO/L (ref 10–25)
ANION GAP BLDA CALCULATED.4IONS-SCNC: 14 MMO/L (ref 10–25)
ANION GAP BLDA CALCULATED.4IONS-SCNC: 16 MMO/L (ref 10–25)
ANION GAP BLDA CALCULATED.4IONS-SCNC: 7 MMO/L (ref 10–25)
ANION GAP BLDA CALCULATED.4IONS-SCNC: 9 MMO/L (ref 10–25)
ANION GAP BLDV CALCULATED.4IONS-SCNC: 9 MMOL/L (ref 10–25)
ANION GAP SERPL CALC-SCNC: 15 MMOL/L (ref 10–20)
APTT PPP: 29 SECONDS (ref 26–36)
BASE EXCESS BLDA CALC-SCNC: -0.2 MMOL/L (ref -2–3)
BASE EXCESS BLDA CALC-SCNC: -0.8 MMOL/L (ref -2–3)
BASE EXCESS BLDA CALC-SCNC: -1.7 MMOL/L (ref -2–3)
BASE EXCESS BLDA CALC-SCNC: -1.9 MMOL/L (ref -2–3)
BASE EXCESS BLDA CALC-SCNC: -13.9 MMOL/L (ref -2–3)
BASE EXCESS BLDA CALC-SCNC: -3.2 MMOL/L (ref -2–3)
BASE EXCESS BLDA CALC-SCNC: -5.9 MMOL/L (ref -2–3)
BASE EXCESS BLDA CALC-SCNC: -6.2 MMOL/L (ref -2–3)
BASE EXCESS BLDA CALC-SCNC: -6.6 MMOL/L (ref -2–3)
BASE EXCESS BLDA CALC-SCNC: -8.2 MMOL/L (ref -2–3)
BASE EXCESS BLDA CALC-SCNC: 0.7 MMOL/L (ref -2–3)
BASE EXCESS BLDA CALC-SCNC: 1.9 MMOL/L (ref -2–3)
BASE EXCESS BLDV CALC-SCNC: 2.1 MMOL/L (ref -2–3)
BODY TEMPERATURE: 37 DEGREES CELSIUS
BUN SERPL-MCNC: 17 MG/DL (ref 6–23)
CA-I BLDA-SCNC: 0.75 MMOL/L (ref 1.1–1.33)
CA-I BLDA-SCNC: 0.93 MMOL/L (ref 1.1–1.33)
CA-I BLDA-SCNC: 0.97 MMOL/L (ref 1.1–1.33)
CA-I BLDA-SCNC: 1.02 MMOL/L (ref 1.1–1.33)
CA-I BLDA-SCNC: 1.04 MMOL/L (ref 1.1–1.33)
CA-I BLDA-SCNC: 1.07 MMOL/L (ref 1.1–1.33)
CA-I BLDA-SCNC: 1.07 MMOL/L (ref 1.1–1.33)
CA-I BLDA-SCNC: 1.09 MMOL/L (ref 1.1–1.33)
CA-I BLDA-SCNC: 1.15 MMOL/L (ref 1.1–1.33)
CA-I BLDA-SCNC: 1.19 MMOL/L (ref 1.1–1.33)
CA-I BLDA-SCNC: 1.21 MMOL/L (ref 1.1–1.33)
CA-I BLDA-SCNC: 1.34 MMOL/L (ref 1.1–1.33)
CA-I BLDV-SCNC: 0.97 MMOL/L (ref 1.1–1.33)
CALCIUM SERPL-MCNC: 6.4 MG/DL (ref 8.6–10.6)
CFT FORM KAOLIN IND BLD RES TEG: 1.1 MIN (ref 0.8–2.1)
CFT FORM KAOLIN IND BLD RES TEG: 1.2 MIN (ref 0.8–2.1)
CFT FORM KAOLIN IND BLD RES TEG: 2.5 MIN (ref 0.8–2.1)
CHLORIDE BLDA-SCNC: 101 MMOL/L (ref 98–107)
CHLORIDE BLDA-SCNC: 104 MMOL/L (ref 98–107)
CHLORIDE BLDA-SCNC: 105 MMOL/L (ref 98–107)
CHLORIDE BLDA-SCNC: 106 MMOL/L (ref 98–107)
CHLORIDE BLDA-SCNC: 107 MMOL/L (ref 98–107)
CHLORIDE BLDA-SCNC: 107 MMOL/L (ref 98–107)
CHLORIDE BLDA-SCNC: 108 MMOL/L (ref 98–107)
CHLORIDE BLDA-SCNC: 108 MMOL/L (ref 98–107)
CHLORIDE BLDA-SCNC: 109 MMOL/L (ref 98–107)
CHLORIDE BLDA-SCNC: 121 MMOL/L (ref 98–107)
CHLORIDE BLDV-SCNC: 102 MMOL/L (ref 98–107)
CHLORIDE SERPL-SCNC: 115 MMOL/L (ref 98–107)
CLOT ANGLE.KAOLIN INDUCED BLD RES TEG: 63 DEG (ref 63–78)
CLOT ANGLE.KAOLIN INDUCED BLD RES TEG: 72 DEG (ref 63–78)
CLOT ANGLE.KAOLIN INDUCED BLD RES TEG: 76 DEG (ref 63–78)
CLOT INIT KAO IND P HEP NEUT BLD RES TEG: 10.3 MIN (ref 4.6–9.1)
CLOT INIT KAO IND P HEP NEUT BLD RES TEG: 13.1 MIN (ref 4.3–8.3)
CLOT INIT KAO IND P HEP NEUT BLD RES TEG: 13.2 MIN (ref 4.6–9.1)
CLOT INIT KAO IND P HEP NEUT BLD RES TEG: 13.8 MIN (ref 4.3–8.3)
CLOT INIT KAO IND P HEP NEUT BLD RES TEG: 7.9 MIN (ref 4.3–8.3)
CLOT INIT KAO IND P HEP NEUT BLD RES TEG: 9.1 MIN (ref 4.6–9.1)
CO2 SERPL-SCNC: 15 MMOL/L (ref 21–32)
COHGB MFR BLDA: 0.5 %
COHGB MFR BLDA: 0.8 %
COHGB MFR BLDA: 0.9 %
COHGB MFR BLDA: 1 %
COHGB MFR BLDA: 1.1 %
COHGB MFR BLDA: 1.2 %
COHGB MFR BLDA: 1.2 %
CREAT SERPL-MCNC: 0.65 MG/DL (ref 0.5–1.3)
DO-HGB MFR BLDA: 0 % (ref 0–5)
DO-HGB MFR BLDA: 0 % (ref 0–5)
DO-HGB MFR BLDA: 0.2 % (ref 0–5)
DO-HGB MFR BLDA: 0.3 % (ref 0–5)
DO-HGB MFR BLDA: 0.4 % (ref 0–5)
DO-HGB MFR BLDA: 0.5 % (ref 0–5)
DO-HGB MFR BLDA: 2.7 % (ref 0–5)
EGFRCR SERPLBLD CKD-EPI 2021: >90 ML/MIN/1.73M*2
EJECTION FRACTION: 58 %
ERYTHROCYTE [DISTWIDTH] IN BLOOD BY AUTOMATED COUNT: 13.2 % (ref 11.5–14.5)
FIBRINOGEN BLD CALC-MCNC: 330 MG/DL (ref 278–581)
FIBRINOGEN BLD CALC-MCNC: 352 MG/DL (ref 278–581)
FIBRINOGEN BLD CALC-MCNC: 414 MG/DL (ref 278–581)
FIBRINOGEN PPP-MCNC: 205 MG/DL (ref 200–400)
GLUCOSE BLDA-MCNC: 124 MG/DL (ref 74–99)
GLUCOSE BLDA-MCNC: 134 MG/DL (ref 74–99)
GLUCOSE BLDA-MCNC: 140 MG/DL (ref 74–99)
GLUCOSE BLDA-MCNC: 148 MG/DL (ref 74–99)
GLUCOSE BLDA-MCNC: 150 MG/DL (ref 74–99)
GLUCOSE BLDA-MCNC: 157 MG/DL (ref 74–99)
GLUCOSE BLDA-MCNC: 162 MG/DL (ref 74–99)
GLUCOSE BLDA-MCNC: 186 MG/DL (ref 74–99)
GLUCOSE BLDA-MCNC: 188 MG/DL (ref 74–99)
GLUCOSE BLDA-MCNC: 196 MG/DL (ref 74–99)
GLUCOSE BLDA-MCNC: 200 MG/DL (ref 74–99)
GLUCOSE BLDA-MCNC: 98 MG/DL (ref 74–99)
GLUCOSE BLDV-MCNC: 133 MG/DL (ref 74–99)
GLUCOSE SERPL-MCNC: 145 MG/DL (ref 74–99)
HCO3 BLDA-SCNC: 11.5 MMOL/L (ref 22–26)
HCO3 BLDA-SCNC: 17.1 MMOL/L (ref 22–26)
HCO3 BLDA-SCNC: 18.5 MMOL/L (ref 22–26)
HCO3 BLDA-SCNC: 18.5 MMOL/L (ref 22–26)
HCO3 BLDA-SCNC: 19.6 MMOL/L (ref 22–26)
HCO3 BLDA-SCNC: 22 MMOL/L (ref 22–26)
HCO3 BLDA-SCNC: 23.7 MMOL/L (ref 22–26)
HCO3 BLDA-SCNC: 23.7 MMOL/L (ref 22–26)
HCO3 BLDA-SCNC: 24 MMOL/L (ref 22–26)
HCO3 BLDA-SCNC: 24.3 MMOL/L (ref 22–26)
HCO3 BLDA-SCNC: 25.4 MMOL/L (ref 22–26)
HCO3 BLDA-SCNC: 26.6 MMOL/L (ref 22–26)
HCO3 BLDV-SCNC: 27.3 MMOL/L (ref 22–26)
HCT VFR BLD AUTO: 38.7 % (ref 41–52)
HCT VFR BLD EST: 30 % (ref 41–52)
HCT VFR BLD EST: 30 % (ref 41–52)
HCT VFR BLD EST: 31 % (ref 41–52)
HCT VFR BLD EST: 31 % (ref 41–52)
HCT VFR BLD EST: 32 % (ref 41–52)
HCT VFR BLD EST: 36 % (ref 41–52)
HCT VFR BLD EST: 39 % (ref 41–52)
HCT VFR BLD EST: 40 % (ref 41–52)
HCT VFR BLD EST: 40 % (ref 41–52)
HCT VFR BLD EST: 41 % (ref 41–52)
HGB BLD-MCNC: 12.3 G/DL (ref 13.5–17.5)
HGB BLDA-MCNC: 10.1 G/DL (ref 13.5–17.5)
HGB BLDA-MCNC: 10.2 G/DL (ref 13.5–17.5)
HGB BLDA-MCNC: 10.6 G/DL (ref 13.5–17.5)
HGB BLDA-MCNC: 10.6 G/DL (ref 13.5–17.5)
HGB BLDA-MCNC: 11.9 G/DL (ref 13.5–17.5)
HGB BLDA-MCNC: 13 G/DL (ref 13.5–17.5)
HGB BLDA-MCNC: 13 G/DL (ref 13.5–17.5)
HGB BLDA-MCNC: 13.1 G/DL (ref 13.5–17.5)
HGB BLDA-MCNC: 13.3 G/DL (ref 13.5–17.5)
HGB BLDA-MCNC: 13.7 G/DL (ref 13.5–17.5)
HGB BLDV-MCNC: 10.1 G/DL (ref 13.5–17.5)
INHALED O2 CONCENTRATION: 100 %
INHALED O2 CONCENTRATION: 100 %
INHALED O2 CONCENTRATION: 21 %
INHALED O2 CONCENTRATION: 21 %
INHALED O2 CONCENTRATION: 28 %
INHALED O2 CONCENTRATION: 36 %
INHALED O2 CONCENTRATION: 40 %
INHALED O2 CONCENTRATION: 50 %
INHALED O2 CONCENTRATION: 60 %
INHALED O2 CONCENTRATION: 80 %
INHALED O2 CONCENTRATION: 85 %
INR PPP: 1.4 (ref 0.9–1.1)
LACTATE BLDA-SCNC: 0.8 MMOL/L (ref 0.4–2)
LACTATE BLDA-SCNC: 1.1 MMOL/L (ref 0.4–2)
LACTATE BLDA-SCNC: 1.3 MMOL/L (ref 0.4–2)
LACTATE BLDA-SCNC: 1.3 MMOL/L (ref 0.4–2)
LACTATE BLDA-SCNC: 1.4 MMOL/L (ref 0.4–2)
LACTATE BLDA-SCNC: 1.9 MMOL/L (ref 0.4–2)
LACTATE BLDA-SCNC: 2.1 MMOL/L (ref 0.4–2)
LACTATE BLDA-SCNC: 2.2 MMOL/L (ref 0.4–2)
LACTATE BLDA-SCNC: 2.5 MMOL/L (ref 0.4–2)
LACTATE BLDA-SCNC: 2.9 MMOL/L (ref 0.4–2)
LACTATE BLDA-SCNC: 3.5 MMOL/L (ref 0.4–2)
LACTATE BLDA-SCNC: 3.8 MMOL/L (ref 0.4–2)
LACTATE BLDV-SCNC: 1.1 MMOL/L (ref 0.4–2)
MA KAOLIN BLD RES TEG: 55 MM (ref 52–69)
MA KAOLIN BLD RES TEG: 60 MM (ref 52–69)
MA KAOLIN BLD RES TEG: 61 MM (ref 52–69)
MA KAOLIN+TF BLD RES TEG: 60 MM (ref 52–70)
MA KAOLIN+TF BLD RES TEG: 60 MM (ref 52–70)
MA KAOLIN+TF BLD RES TEG: 64 MM (ref 52–70)
MA TF IND+IIB-IIIA INH BLD RES TEG: 18 MM (ref 15–32)
MA TF IND+IIB-IIIA INH BLD RES TEG: 19 MM (ref 15–32)
MA TF IND+IIB-IIIA INH BLD RES TEG: 23 MM (ref 15–32)
MAGNESIUM SERPL-MCNC: 2.85 MG/DL (ref 1.6–2.4)
MCH RBC QN AUTO: 30.2 PG (ref 26–34)
MCHC RBC AUTO-ENTMCNC: 31.8 G/DL (ref 32–36)
MCV RBC AUTO: 95 FL (ref 80–100)
METHGB MFR BLDA: 0.5 % (ref 0–1.5)
METHGB MFR BLDA: 0.8 % (ref 0–1.5)
METHGB MFR BLDA: 0.8 % (ref 0–1.5)
METHGB MFR BLDA: 0.9 % (ref 0–1.5)
METHGB MFR BLDA: 1 % (ref 0–1.5)
METHGB MFR BLDA: 1 % (ref 0–1.5)
METHGB MFR BLDA: 1.1 % (ref 0–1.5)
NRBC BLD-RTO: 0 /100 WBCS (ref 0–0)
OXYHGB MFR BLDA: 94.5 % (ref 94–98)
OXYHGB MFR BLDA: 94.7 % (ref 94–98)
OXYHGB MFR BLDA: 95.3 % (ref 94–98)
OXYHGB MFR BLDA: 95.3 % (ref 94–98)
OXYHGB MFR BLDA: 96.8 % (ref 94–98)
OXYHGB MFR BLDA: 97.5 % (ref 94–98)
OXYHGB MFR BLDA: 97.5 % (ref 94–98)
OXYHGB MFR BLDA: 97.7 % (ref 94–98)
OXYHGB MFR BLDA: 97.7 % (ref 94–98)
OXYHGB MFR BLDA: 97.9 % (ref 94–98)
OXYHGB MFR BLDA: 97.9 % (ref 94–98)
OXYHGB MFR BLDA: 98.1 % (ref 94–98)
OXYHGB MFR BLDA: 98.1 % (ref 94–98)
OXYHGB MFR BLDA: 98.3 % (ref 94–98)
OXYHGB MFR BLDV: 88.4 % (ref 45–75)
PCO2 BLDA: 25 MM HG (ref 38–42)
PCO2 BLDA: 32 MM HG (ref 38–42)
PCO2 BLDA: 34 MM HG (ref 38–42)
PCO2 BLDA: 35 MM HG (ref 38–42)
PCO2 BLDA: 37 MM HG (ref 38–42)
PCO2 BLDA: 39 MM HG (ref 38–42)
PCO2 BLDA: 39 MM HG (ref 38–42)
PCO2 BLDA: 40 MM HG (ref 38–42)
PCO2 BLDA: 41 MM HG (ref 38–42)
PCO2 BLDA: 41 MM HG (ref 38–42)
PCO2 BLDA: 42 MM HG (ref 38–42)
PCO2 BLDA: 43 MM HG (ref 38–42)
PCO2 BLDV: 44 MM HG (ref 41–51)
PH BLDA: 7.27 PH (ref 7.38–7.42)
PH BLDA: 7.31 PH (ref 7.38–7.42)
PH BLDA: 7.31 PH (ref 7.38–7.42)
PH BLDA: 7.33 PH (ref 7.38–7.42)
PH BLDA: 7.35 PH (ref 7.38–7.42)
PH BLDA: 7.36 PH (ref 7.38–7.42)
PH BLDA: 7.36 PH (ref 7.38–7.42)
PH BLDA: 7.37 PH (ref 7.38–7.42)
PH BLDA: 7.38 PH (ref 7.38–7.42)
PH BLDA: 7.41 PH (ref 7.38–7.42)
PH BLDA: 7.42 PH (ref 7.38–7.42)
PH BLDA: 7.42 PH (ref 7.38–7.42)
PH BLDV: 7.4 PH (ref 7.33–7.43)
PHOSPHATE SERPL-MCNC: 3.3 MG/DL (ref 2.5–4.9)
PLATELET # BLD AUTO: 155 X10*3/UL (ref 150–450)
PO2 BLDA: 110 MM HG (ref 85–95)
PO2 BLDA: 111 MM HG (ref 85–95)
PO2 BLDA: 118 MM HG (ref 85–95)
PO2 BLDA: 126 MM HG (ref 85–95)
PO2 BLDA: 136 MM HG (ref 85–95)
PO2 BLDA: 306 MM HG (ref 85–95)
PO2 BLDA: 350 MM HG (ref 85–95)
PO2 BLDA: 355 MM HG (ref 85–95)
PO2 BLDA: 406 MM HG (ref 85–95)
PO2 BLDA: 73 MM HG (ref 85–95)
PO2 BLDA: 75 MM HG (ref 85–95)
PO2 BLDA: 81 MM HG (ref 85–95)
PO2 BLDV: 55 MM HG (ref 35–45)
POTASSIUM BLDA-SCNC: 2.7 MMOL/L (ref 3.5–5.3)
POTASSIUM BLDA-SCNC: 3.4 MMOL/L (ref 3.5–5.3)
POTASSIUM BLDA-SCNC: 4 MMOL/L (ref 3.5–5.3)
POTASSIUM BLDA-SCNC: 4.1 MMOL/L (ref 3.5–5.3)
POTASSIUM BLDA-SCNC: 4.1 MMOL/L (ref 3.5–5.3)
POTASSIUM BLDA-SCNC: 4.3 MMOL/L (ref 3.5–5.3)
POTASSIUM BLDA-SCNC: 5 MMOL/L (ref 3.5–5.3)
POTASSIUM BLDA-SCNC: 5.1 MMOL/L (ref 3.5–5.3)
POTASSIUM BLDA-SCNC: 5.3 MMOL/L (ref 3.5–5.3)
POTASSIUM BLDA-SCNC: 5.4 MMOL/L (ref 3.5–5.3)
POTASSIUM BLDV-SCNC: 3.9 MMOL/L (ref 3.5–5.3)
POTASSIUM SERPL-SCNC: 3.7 MMOL/L (ref 3.5–5.3)
PROTHROMBIN TIME: 15.7 SECONDS (ref 9.8–12.4)
RBC # BLD AUTO: 4.07 X10*6/UL (ref 4.5–5.9)
RH FACTOR (ANTIGEN D): NORMAL
SAO2 % BLDA: 100 % (ref 94–100)
SAO2 % BLDA: 97 % (ref 94–100)
SAO2 % BLDA: 97 % (ref 94–100)
SAO2 % BLDA: 98 % (ref 94–100)
SAO2 % BLDA: 99 % (ref 94–100)
SAO2 % BLDV: 90 % (ref 45–75)
SODIUM BLDA-SCNC: 131 MMOL/L (ref 136–145)
SODIUM BLDA-SCNC: 133 MMOL/L (ref 136–145)
SODIUM BLDA-SCNC: 134 MMOL/L (ref 136–145)
SODIUM BLDA-SCNC: 135 MMOL/L (ref 136–145)
SODIUM BLDA-SCNC: 136 MMOL/L (ref 136–145)
SODIUM BLDA-SCNC: 139 MMOL/L (ref 136–145)
SODIUM BLDV-SCNC: 134 MMOL/L (ref 136–145)
SODIUM SERPL-SCNC: 141 MMOL/L (ref 136–145)
STAPHYLOCOCCUS SPEC CULT: ABNORMAL
TEST COMMENT: ABNORMAL
TEST COMMENT: ABNORMAL
TEST COMMENT: NORMAL
WBC # BLD AUTO: 21.9 X10*3/UL (ref 4.4–11.3)

## 2025-06-24 PROCEDURE — 33533 CABG ARTERIAL SINGLE: CPT | Performed by: STUDENT IN AN ORGANIZED HEALTH CARE EDUCATION/TRAINING PROGRAM

## 2025-06-24 PROCEDURE — 71045 X-RAY EXAM CHEST 1 VIEW: CPT

## 2025-06-24 PROCEDURE — 2020000001 HC ICU ROOM DAILY

## 2025-06-24 PROCEDURE — 2780000003 HC OR 278 NO HCPCS: Performed by: STUDENT IN AN ORGANIZED HEALTH CARE EDUCATION/TRAINING PROGRAM

## 2025-06-24 PROCEDURE — 2500000004 HC RX 250 GENERAL PHARMACY W/ HCPCS (ALT 636 FOR OP/ED): Performed by: STUDENT IN AN ORGANIZED HEALTH CARE EDUCATION/TRAINING PROGRAM

## 2025-06-24 PROCEDURE — 37799 UNLISTED PX VASCULAR SURGERY: CPT | Performed by: PHYSICIAN ASSISTANT

## 2025-06-24 PROCEDURE — 82810 BLOOD GASES O2 SAT ONLY: CPT

## 2025-06-24 PROCEDURE — 021109W BYPASS CORONARY ARTERY, TWO ARTERIES FROM AORTA WITH AUTOLOGOUS VENOUS TISSUE, OPEN APPROACH: ICD-10-PCS | Performed by: STUDENT IN AN ORGANIZED HEALTH CARE EDUCATION/TRAINING PROGRAM

## 2025-06-24 PROCEDURE — 2500000005 HC RX 250 GENERAL PHARMACY W/O HCPCS: Performed by: STUDENT IN AN ORGANIZED HEALTH CARE EDUCATION/TRAINING PROGRAM

## 2025-06-24 PROCEDURE — 84132 ASSAY OF SERUM POTASSIUM: CPT | Performed by: PHYSICIAN ASSISTANT

## 2025-06-24 PROCEDURE — 93010 ELECTROCARDIOGRAM REPORT: CPT | Performed by: STUDENT IN AN ORGANIZED HEALTH CARE EDUCATION/TRAINING PROGRAM

## 2025-06-24 PROCEDURE — 76937 US GUIDE VASCULAR ACCESS: CPT

## 2025-06-24 PROCEDURE — 36620 INSERTION CATHETER ARTERY: CPT

## 2025-06-24 PROCEDURE — 85610 PROTHROMBIN TIME: CPT | Performed by: PHYSICIAN ASSISTANT

## 2025-06-24 PROCEDURE — 33268 EXCL LAA OPN OTH PX ANY METH: CPT | Performed by: THORACIC SURGERY (CARDIOTHORACIC VASCULAR SURGERY)

## 2025-06-24 PROCEDURE — 85347 COAGULATION TIME ACTIVATED: CPT

## 2025-06-24 PROCEDURE — 84132 ASSAY OF SERUM POTASSIUM: CPT

## 2025-06-24 PROCEDURE — 93005 ELECTROCARDIOGRAM TRACING: CPT

## 2025-06-24 PROCEDURE — 99291 CRITICAL CARE FIRST HOUR: CPT | Performed by: PHYSICIAN ASSISTANT

## 2025-06-24 PROCEDURE — 33518 CABG ARTERY-VEIN TWO: CPT | Performed by: STUDENT IN AN ORGANIZED HEALTH CARE EDUCATION/TRAINING PROGRAM

## 2025-06-24 PROCEDURE — 93321 DOPPLER ECHO F-UP/LMTD STD: CPT | Performed by: ANESTHESIOLOGY

## 2025-06-24 PROCEDURE — 85730 THROMBOPLASTIN TIME PARTIAL: CPT | Performed by: PHYSICIAN ASSISTANT

## 2025-06-24 PROCEDURE — 36415 COLL VENOUS BLD VENIPUNCTURE: CPT

## 2025-06-24 PROCEDURE — 3600000017 HC OR TIME - EACH INCREMENTAL 1 MINUTE - PROCEDURE LEVEL SIX: Performed by: STUDENT IN AN ORGANIZED HEALTH CARE EDUCATION/TRAINING PROGRAM

## 2025-06-24 PROCEDURE — 99100 ANES PT EXTEME AGE<1 YR&>70: CPT | Performed by: ANESTHESIOLOGY

## 2025-06-24 PROCEDURE — 33518 CABG ARTERY-VEIN TWO: CPT | Performed by: THORACIC SURGERY (CARDIOTHORACIC VASCULAR SURGERY)

## 2025-06-24 PROCEDURE — 96373 THER/PROPH/DIAG INJ IA: CPT | Performed by: STUDENT IN AN ORGANIZED HEALTH CARE EDUCATION/TRAINING PROGRAM

## 2025-06-24 PROCEDURE — 93321 DOPPLER ECHO F-UP/LMTD STD: CPT

## 2025-06-24 PROCEDURE — 2500000002 HC RX 250 W HCPCS SELF ADMINISTERED DRUGS (ALT 637 FOR MEDICARE OP, ALT 636 FOR OP/ED): Performed by: PHYSICIAN ASSISTANT

## 2025-06-24 PROCEDURE — 3700000001 HC GENERAL ANESTHESIA TIME - INITIAL BASE CHARGE: Performed by: STUDENT IN AN ORGANIZED HEALTH CARE EDUCATION/TRAINING PROGRAM

## 2025-06-24 PROCEDURE — 2500000001 HC RX 250 WO HCPCS SELF ADMINISTERED DRUGS (ALT 637 FOR MEDICARE OP): Performed by: PHYSICIAN ASSISTANT

## 2025-06-24 PROCEDURE — 82805 BLOOD GASES W/O2 SATURATION: CPT | Performed by: PHYSICIAN ASSISTANT

## 2025-06-24 PROCEDURE — P9045 ALBUMIN (HUMAN), 5%, 250 ML: HCPCS | Mod: JZ,TB | Performed by: PHYSICIAN ASSISTANT

## 2025-06-24 PROCEDURE — 85384 FIBRINOGEN ACTIVITY: CPT

## 2025-06-24 PROCEDURE — P9045 ALBUMIN (HUMAN), 5%, 250 ML: HCPCS | Mod: JZ,TB

## 2025-06-24 PROCEDURE — 02100Z9 BYPASS CORONARY ARTERY, ONE ARTERY FROM LEFT INTERNAL MAMMARY, OPEN APPROACH: ICD-10-PCS | Performed by: STUDENT IN AN ORGANIZED HEALTH CARE EDUCATION/TRAINING PROGRAM

## 2025-06-24 PROCEDURE — 94002 VENT MGMT INPAT INIT DAY: CPT

## 2025-06-24 PROCEDURE — 3600000011 HC PERFUSION TIME - INITIAL BASE CHARGE: Performed by: STUDENT IN AN ORGANIZED HEALTH CARE EDUCATION/TRAINING PROGRAM

## 2025-06-24 PROCEDURE — 2500000005 HC RX 250 GENERAL PHARMACY W/O HCPCS

## 2025-06-24 PROCEDURE — C1889 IMPLANT/INSERT DEVICE, NOC: HCPCS | Performed by: STUDENT IN AN ORGANIZED HEALTH CARE EDUCATION/TRAINING PROGRAM

## 2025-06-24 PROCEDURE — 02L70CK OCCLUSION OF LEFT ATRIAL APPENDAGE WITH EXTRALUMINAL DEVICE, OPEN APPROACH: ICD-10-PCS | Performed by: STUDENT IN AN ORGANIZED HEALTH CARE EDUCATION/TRAINING PROGRAM

## 2025-06-24 PROCEDURE — 85384 FIBRINOGEN ACTIVITY: CPT | Performed by: PHYSICIAN ASSISTANT

## 2025-06-24 PROCEDURE — 84295 ASSAY OF SERUM SODIUM: CPT

## 2025-06-24 PROCEDURE — 33508 ENDOSCOPIC VEIN HARVEST: CPT | Performed by: STUDENT IN AN ORGANIZED HEALTH CARE EDUCATION/TRAINING PROGRAM

## 2025-06-24 PROCEDURE — 82375 ASSAY CARBOXYHB QUANT: CPT

## 2025-06-24 PROCEDURE — 2720000007 HC OR 272 NO HCPCS: Performed by: STUDENT IN AN ORGANIZED HEALTH CARE EDUCATION/TRAINING PROGRAM

## 2025-06-24 PROCEDURE — 73551 X-RAY EXAM OF FEMUR 1: CPT | Mod: RT

## 2025-06-24 PROCEDURE — 2500000004 HC RX 250 GENERAL PHARMACY W/ HCPCS (ALT 636 FOR OP/ED): Mod: JZ,TB

## 2025-06-24 PROCEDURE — A4312 CATH W/O BAG 2-WAY SILICONE: HCPCS | Performed by: STUDENT IN AN ORGANIZED HEALTH CARE EDUCATION/TRAINING PROGRAM

## 2025-06-24 PROCEDURE — A33535 PR CABG, ARTERIAL, THREE

## 2025-06-24 PROCEDURE — 3600000012 HC PERFUSION TIME - EACH INCREMENTAL 1 MINUTE: Performed by: STUDENT IN AN ORGANIZED HEALTH CARE EDUCATION/TRAINING PROGRAM

## 2025-06-24 PROCEDURE — 71045 X-RAY EXAM CHEST 1 VIEW: CPT | Performed by: RADIOLOGY

## 2025-06-24 PROCEDURE — A4649 SURGICAL SUPPLIES: HCPCS | Performed by: STUDENT IN AN ORGANIZED HEALTH CARE EDUCATION/TRAINING PROGRAM

## 2025-06-24 PROCEDURE — 36556 INSERT NON-TUNNEL CV CATH: CPT

## 2025-06-24 PROCEDURE — 83050 HGB METHEMOGLOBIN QUAN: CPT

## 2025-06-24 PROCEDURE — 3700000002 HC GENERAL ANESTHESIA TIME - EACH INCREMENTAL 1 MINUTE: Performed by: STUDENT IN AN ORGANIZED HEALTH CARE EDUCATION/TRAINING PROGRAM

## 2025-06-24 PROCEDURE — 83735 ASSAY OF MAGNESIUM: CPT | Performed by: PHYSICIAN ASSISTANT

## 2025-06-24 PROCEDURE — 3600000018 HC OR TIME - INITIAL BASE CHARGE - PROCEDURE LEVEL SIX: Performed by: STUDENT IN AN ORGANIZED HEALTH CARE EDUCATION/TRAINING PROGRAM

## 2025-06-24 PROCEDURE — 5A1221Z PERFORMANCE OF CARDIAC OUTPUT, CONTINUOUS: ICD-10-PCS | Performed by: STUDENT IN AN ORGANIZED HEALTH CARE EDUCATION/TRAINING PROGRAM

## 2025-06-24 PROCEDURE — 2500000004 HC RX 250 GENERAL PHARMACY W/ HCPCS (ALT 636 FOR OP/ED)

## 2025-06-24 PROCEDURE — 93312 ECHO TRANSESOPHAGEAL: CPT | Performed by: ANESTHESIOLOGY

## 2025-06-24 PROCEDURE — 2500000005 HC RX 250 GENERAL PHARMACY W/O HCPCS: Performed by: PHYSICIAN ASSISTANT

## 2025-06-24 PROCEDURE — 93325 DOPPLER ECHO COLOR FLOW MAPG: CPT

## 2025-06-24 PROCEDURE — 85027 COMPLETE CBC AUTOMATED: CPT | Performed by: PHYSICIAN ASSISTANT

## 2025-06-24 PROCEDURE — 73551 X-RAY EXAM OF FEMUR 1: CPT | Performed by: RADIOLOGY

## 2025-06-24 PROCEDURE — 33268 EXCL LAA OPN OTH PX ANY METH: CPT | Performed by: STUDENT IN AN ORGANIZED HEALTH CARE EDUCATION/TRAINING PROGRAM

## 2025-06-24 PROCEDURE — 85576 BLOOD PLATELET AGGREGATION: CPT

## 2025-06-24 PROCEDURE — 93325 DOPPLER ECHO COLOR FLOW MAPG: CPT | Performed by: ANESTHESIOLOGY

## 2025-06-24 PROCEDURE — P9047 ALBUMIN (HUMAN), 25%, 50ML: HCPCS | Performed by: STUDENT IN AN ORGANIZED HEALTH CARE EDUCATION/TRAINING PROGRAM

## 2025-06-24 PROCEDURE — C1900 LEAD, CORONARY VENOUS: HCPCS | Performed by: STUDENT IN AN ORGANIZED HEALTH CARE EDUCATION/TRAINING PROGRAM

## 2025-06-24 PROCEDURE — A33535 PR CABG, ARTERIAL, THREE: Performed by: ANESTHESIOLOGY

## 2025-06-24 PROCEDURE — 33508 ENDOSCOPIC VEIN HARVEST: CPT | Performed by: THORACIC SURGERY (CARDIOTHORACIC VASCULAR SURGERY)

## 2025-06-24 PROCEDURE — 06BP4ZZ EXCISION OF RIGHT SAPHENOUS VEIN, PERCUTANEOUS ENDOSCOPIC APPROACH: ICD-10-PCS | Performed by: STUDENT IN AN ORGANIZED HEALTH CARE EDUCATION/TRAINING PROGRAM

## 2025-06-24 PROCEDURE — 2500000004 HC RX 250 GENERAL PHARMACY W/ HCPCS (ALT 636 FOR OP/ED): Mod: JZ,TB | Performed by: PHYSICIAN ASSISTANT

## 2025-06-24 PROCEDURE — 33533 CABG ARTERIAL SINGLE: CPT | Performed by: THORACIC SURGERY (CARDIOTHORACIC VASCULAR SURGERY)

## 2025-06-24 DEVICE — ATRICLIP FLEX•V DEVICE 45
Type: IMPLANTABLE DEVICE | Site: HEART | Status: FUNCTIONAL
Brand: ATRICLIP FLEX•V

## 2025-06-24 DEVICE — IMPLANTABLE DEVICE: Type: IMPLANTABLE DEVICE | Site: HEART | Status: NON-FUNCTIONAL

## 2025-06-24 RX ORDER — ALBUMIN HUMAN 50 G/1000ML
25 SOLUTION INTRAVENOUS ONCE
Status: COMPLETED | OUTPATIENT
Start: 2025-06-24 | End: 2025-06-24

## 2025-06-24 RX ORDER — EPINEPHRINE IN 0.9 % SOD CHLOR 4MG/250ML
0-1 PLASTIC BAG, INJECTION (ML) INTRAVENOUS CONTINUOUS
Status: DISCONTINUED | OUTPATIENT
Start: 2025-06-24 | End: 2025-06-24

## 2025-06-24 RX ORDER — NOREPINEPHRINE BITARTRATE/D5W 8 MG/250ML
0-.5 PLASTIC BAG, INJECTION (ML) INTRAVENOUS CONTINUOUS
Status: DISCONTINUED | OUTPATIENT
Start: 2025-06-24 | End: 2025-06-25

## 2025-06-24 RX ORDER — CEFAZOLIN 1 G/1
INJECTION, POWDER, FOR SOLUTION INTRAVENOUS AS NEEDED
Status: DISCONTINUED | OUTPATIENT
Start: 2025-06-24 | End: 2025-06-24

## 2025-06-24 RX ORDER — OXYCODONE HYDROCHLORIDE 10 MG/1
10 TABLET ORAL EVERY 4 HOURS PRN
Status: DISCONTINUED | OUTPATIENT
Start: 2025-06-24 | End: 2025-06-28

## 2025-06-24 RX ORDER — HYDROMORPHONE HYDROCHLORIDE 0.2 MG/ML
0.2 INJECTION INTRAMUSCULAR; INTRAVENOUS; SUBCUTANEOUS
Status: DISCONTINUED | OUTPATIENT
Start: 2025-06-24 | End: 2025-06-24

## 2025-06-24 RX ORDER — EPINEPHRINE IN 0.9 % SOD CHLOR 4MG/250ML
PLASTIC BAG, INJECTION (ML) INTRAVENOUS CONTINUOUS PRN
Status: DISCONTINUED | OUTPATIENT
Start: 2025-06-24 | End: 2025-06-24

## 2025-06-24 RX ORDER — POTASSIUM CHLORIDE 1.5 G/1.58G
20 POWDER, FOR SOLUTION ORAL EVERY 6 HOURS PRN
Status: DISCONTINUED | OUTPATIENT
Start: 2025-06-24 | End: 2025-06-25

## 2025-06-24 RX ORDER — MIDAZOLAM HYDROCHLORIDE 1 MG/ML
INJECTION INTRAMUSCULAR; INTRAVENOUS AS NEEDED
Status: DISCONTINUED | OUTPATIENT
Start: 2025-06-24 | End: 2025-06-24

## 2025-06-24 RX ORDER — MAGNESIUM SULFATE HEPTAHYDRATE 500 MG/ML
INJECTION, SOLUTION INTRAMUSCULAR; INTRAVENOUS AS NEEDED
Status: DISCONTINUED | OUTPATIENT
Start: 2025-06-24 | End: 2025-06-24

## 2025-06-24 RX ORDER — PROPOFOL 10 MG/ML
INJECTION, EMULSION INTRAVENOUS AS NEEDED
Status: DISCONTINUED | OUTPATIENT
Start: 2025-06-24 | End: 2025-06-24

## 2025-06-24 RX ORDER — PAPAVERINE HYDROCHLORIDE 30 MG/ML
INJECTION INTRAMUSCULAR; INTRAVENOUS AS NEEDED
Status: DISCONTINUED | OUTPATIENT
Start: 2025-06-24 | End: 2025-06-24 | Stop reason: HOSPADM

## 2025-06-24 RX ORDER — SODIUM CHLORIDE, SODIUM GLUCONATE, SODIUM ACETATE, POTASSIUM CHLORIDE AND MAGNESIUM CHLORIDE 30; 37; 368; 526; 502 MG/100ML; MG/100ML; MG/100ML; MG/100ML; MG/100ML
INJECTION, SOLUTION INTRAVENOUS CONTINUOUS PRN
Status: COMPLETED | OUTPATIENT
Start: 2025-06-24 | End: 2025-06-24

## 2025-06-24 RX ORDER — SODIUM CHLORIDE, SODIUM LACTATE, POTASSIUM CHLORIDE, CALCIUM CHLORIDE 600; 310; 30; 20 MG/100ML; MG/100ML; MG/100ML; MG/100ML
5 INJECTION, SOLUTION INTRAVENOUS CONTINUOUS
Status: DISCONTINUED | OUTPATIENT
Start: 2025-06-24 | End: 2025-06-25

## 2025-06-24 RX ORDER — NALOXONE HYDROCHLORIDE 0.4 MG/ML
0.2 INJECTION, SOLUTION INTRAMUSCULAR; INTRAVENOUS; SUBCUTANEOUS EVERY 5 MIN PRN
Status: DISCONTINUED | OUTPATIENT
Start: 2025-06-24 | End: 2025-06-25

## 2025-06-24 RX ORDER — MAGNESIUM SULFATE HEPTAHYDRATE 40 MG/ML
2 INJECTION, SOLUTION INTRAVENOUS EVERY 6 HOURS PRN
Status: DISCONTINUED | OUTPATIENT
Start: 2025-06-24 | End: 2025-06-25

## 2025-06-24 RX ORDER — SODIUM CHLORIDE, SODIUM GLUCONATE, SODIUM ACETATE, POTASSIUM CHLORIDE AND MAGNESIUM CHLORIDE 30; 37; 368; 526; 502 MG/100ML; MG/100ML; MG/100ML; MG/100ML; MG/100ML
500 INJECTION, SOLUTION INTRAVENOUS ONCE
Status: COMPLETED | OUTPATIENT
Start: 2025-06-24 | End: 2025-06-24

## 2025-06-24 RX ORDER — INDOMETHACIN 25 MG/1
CAPSULE ORAL AS NEEDED
Status: DISCONTINUED | OUTPATIENT
Start: 2025-06-24 | End: 2025-06-24

## 2025-06-24 RX ORDER — ETOMIDATE 2 MG/ML
INJECTION INTRAVENOUS AS NEEDED
Status: DISCONTINUED | OUTPATIENT
Start: 2025-06-24 | End: 2025-06-24

## 2025-06-24 RX ORDER — POTASSIUM CHLORIDE 20 MEQ/1
40 TABLET, EXTENDED RELEASE ORAL EVERY 6 HOURS PRN
Status: DISCONTINUED | OUTPATIENT
Start: 2025-06-24 | End: 2025-06-25

## 2025-06-24 RX ORDER — CEFAZOLIN SODIUM 2 G/100ML
2 INJECTION, SOLUTION INTRAVENOUS EVERY 8 HOURS
Status: COMPLETED | OUTPATIENT
Start: 2025-06-24 | End: 2025-06-26

## 2025-06-24 RX ORDER — HEPARIN SODIUM 1000 [USP'U]/ML
INJECTION, SOLUTION INTRAVENOUS; SUBCUTANEOUS AS NEEDED
Status: DISCONTINUED | OUTPATIENT
Start: 2025-06-24 | End: 2025-06-24 | Stop reason: HOSPADM

## 2025-06-24 RX ORDER — ESMOLOL HYDROCHLORIDE 10 MG/ML
INJECTION INTRAVENOUS AS NEEDED
Status: DISCONTINUED | OUTPATIENT
Start: 2025-06-24 | End: 2025-06-24

## 2025-06-24 RX ORDER — POTASSIUM CHLORIDE 29.8 MG/ML
40 INJECTION INTRAVENOUS EVERY 6 HOURS PRN
Status: DISCONTINUED | OUTPATIENT
Start: 2025-06-24 | End: 2025-06-25

## 2025-06-24 RX ORDER — MUPIROCIN 20 MG/G
OINTMENT TOPICAL 2 TIMES DAILY
Status: DISPENSED | OUTPATIENT
Start: 2025-06-24 | End: 2025-06-29

## 2025-06-24 RX ORDER — INSULIN LISPRO 100 [IU]/ML
0-15 INJECTION, SOLUTION INTRAVENOUS; SUBCUTANEOUS EVERY 4 HOURS
Status: DISCONTINUED | OUTPATIENT
Start: 2025-06-24 | End: 2025-06-25

## 2025-06-24 RX ORDER — SODIUM CHLORIDE, SODIUM LACTATE, POTASSIUM CHLORIDE, CALCIUM CHLORIDE 600; 310; 30; 20 MG/100ML; MG/100ML; MG/100ML; MG/100ML
30 INJECTION, SOLUTION INTRAVENOUS CONTINUOUS
Status: DISCONTINUED | OUTPATIENT
Start: 2025-06-24 | End: 2025-06-25

## 2025-06-24 RX ORDER — LIDOCAINE 560 MG/1
1 PATCH PERCUTANEOUS; TOPICAL; TRANSDERMAL EVERY 24 HOURS
Status: DISPENSED | OUTPATIENT
Start: 2025-06-24 | End: 2025-06-27

## 2025-06-24 RX ORDER — SODIUM CHLORIDE, SODIUM GLUCONATE, SODIUM ACETATE, POTASSIUM CHLORIDE AND MAGNESIUM CHLORIDE 30; 37; 368; 526; 502 MG/100ML; MG/100ML; MG/100ML; MG/100ML; MG/100ML
INJECTION, SOLUTION INTRAVENOUS CONTINUOUS PRN
Status: DISCONTINUED | OUTPATIENT
Start: 2025-06-24 | End: 2025-06-24

## 2025-06-24 RX ORDER — SODIUM CHLORIDE, SODIUM LACTATE, POTASSIUM CHLORIDE, CALCIUM CHLORIDE 600; 310; 30; 20 MG/100ML; MG/100ML; MG/100ML; MG/100ML
10 INJECTION, SOLUTION INTRAVENOUS CONTINUOUS
Status: DISCONTINUED | OUTPATIENT
Start: 2025-06-24 | End: 2025-06-24

## 2025-06-24 RX ORDER — SODIUM CHLORIDE 0.9 G/100ML
INJECTION, SOLUTION IRRIGATION AS NEEDED
Status: DISCONTINUED | OUTPATIENT
Start: 2025-06-24 | End: 2025-06-24 | Stop reason: HOSPADM

## 2025-06-24 RX ORDER — NOREPINEPHRINE BITARTRATE 0.03 MG/ML
INJECTION, SOLUTION INTRAVENOUS CONTINUOUS PRN
Status: DISCONTINUED | OUTPATIENT
Start: 2025-06-24 | End: 2025-06-24

## 2025-06-24 RX ORDER — ROCURONIUM BROMIDE 10 MG/ML
INJECTION, SOLUTION INTRAVENOUS AS NEEDED
Status: DISCONTINUED | OUTPATIENT
Start: 2025-06-24 | End: 2025-06-24

## 2025-06-24 RX ORDER — FENTANYL CITRATE 50 UG/ML
INJECTION, SOLUTION INTRAMUSCULAR; INTRAVENOUS AS NEEDED
Status: DISCONTINUED | OUTPATIENT
Start: 2025-06-24 | End: 2025-06-24

## 2025-06-24 RX ORDER — ACETAMINOPHEN 325 MG/1
650 TABLET ORAL EVERY 6 HOURS
Status: DISCONTINUED | OUTPATIENT
Start: 2025-06-24 | End: 2025-06-25

## 2025-06-24 RX ORDER — POLYETHYLENE GLYCOL 3350 17 G/17G
17 POWDER, FOR SOLUTION ORAL DAILY
Status: DISPENSED | OUTPATIENT
Start: 2025-06-24

## 2025-06-24 RX ORDER — POTASSIUM CHLORIDE 20 MEQ/1
20 TABLET, EXTENDED RELEASE ORAL EVERY 6 HOURS PRN
Status: DISCONTINUED | OUTPATIENT
Start: 2025-06-24 | End: 2025-06-25

## 2025-06-24 RX ORDER — HYDROMORPHONE HYDROCHLORIDE 0.2 MG/ML
0.2 INJECTION INTRAMUSCULAR; INTRAVENOUS; SUBCUTANEOUS
Status: DISCONTINUED | OUTPATIENT
Start: 2025-06-24 | End: 2025-06-25

## 2025-06-24 RX ORDER — POTASSIUM CHLORIDE 1.5 G/1.58G
40 POWDER, FOR SOLUTION ORAL EVERY 6 HOURS PRN
Status: DISCONTINUED | OUTPATIENT
Start: 2025-06-24 | End: 2025-06-25

## 2025-06-24 RX ORDER — LIDOCAINE HYDROCHLORIDE 20 MG/ML
INJECTION, SOLUTION INFILTRATION; PERINEURAL AS NEEDED
Status: DISCONTINUED | OUTPATIENT
Start: 2025-06-24 | End: 2025-06-24

## 2025-06-24 RX ORDER — OXYCODONE HYDROCHLORIDE 5 MG/1
5 TABLET ORAL EVERY 4 HOURS PRN
Status: DISPENSED | OUTPATIENT
Start: 2025-06-24

## 2025-06-24 RX ORDER — CALCIUM CHLORIDE INJECTION 100 MG/ML
INJECTION, SOLUTION INTRAVENOUS AS NEEDED
Status: DISCONTINUED | OUTPATIENT
Start: 2025-06-24 | End: 2025-06-24

## 2025-06-24 RX ORDER — LIDOCAINE HCL/PF 100 MG/5ML
SYRINGE (ML) INTRAVENOUS AS NEEDED
Status: DISCONTINUED | OUTPATIENT
Start: 2025-06-24 | End: 2025-06-24

## 2025-06-24 RX ORDER — SODIUM CHLORIDE 9 MG/ML
INJECTION, SOLUTION INTRAVENOUS CONTINUOUS PRN
Status: DISCONTINUED | OUTPATIENT
Start: 2025-06-24 | End: 2025-06-24

## 2025-06-24 RX ORDER — PROTAMINE SULFATE 10 MG/ML
INJECTION, SOLUTION INTRAVENOUS AS NEEDED
Status: DISCONTINUED | OUTPATIENT
Start: 2025-06-24 | End: 2025-06-24

## 2025-06-24 RX ORDER — SODIUM CHLORIDE, SODIUM GLUCONATE, SODIUM ACETATE, POTASSIUM CHLORIDE AND MAGNESIUM CHLORIDE 30; 37; 368; 526; 502 MG/100ML; MG/100ML; MG/100ML; MG/100ML; MG/100ML
500 INJECTION, SOLUTION INTRAVENOUS ONCE
Status: DISCONTINUED | OUTPATIENT
Start: 2025-06-24 | End: 2025-06-24

## 2025-06-24 RX ORDER — DEXTROSE 50 % IN WATER (D50W) INTRAVENOUS SYRINGE
25
Status: DISCONTINUED | OUTPATIENT
Start: 2025-06-24 | End: 2025-06-25

## 2025-06-24 RX ORDER — NAPROXEN SODIUM 220 MG/1
81 TABLET, FILM COATED ORAL DAILY
Status: DISPENSED | OUTPATIENT
Start: 2025-06-24

## 2025-06-24 RX ORDER — ATROPINE SULFATE 1 MG/ML
INJECTION, SOLUTION INTRAVENOUS AS NEEDED
Status: DISCONTINUED | OUTPATIENT
Start: 2025-06-24 | End: 2025-06-24

## 2025-06-24 RX ORDER — NITROGLYCERIN 40 MG/100ML
INJECTION INTRAVENOUS AS NEEDED
Status: DISCONTINUED | OUTPATIENT
Start: 2025-06-24 | End: 2025-06-24

## 2025-06-24 RX ORDER — VANCOMYCIN HYDROCHLORIDE 1 G/20ML
INJECTION, POWDER, LYOPHILIZED, FOR SOLUTION INTRAVENOUS AS NEEDED
Status: DISCONTINUED | OUTPATIENT
Start: 2025-06-24 | End: 2025-06-24 | Stop reason: HOSPADM

## 2025-06-24 RX ORDER — PROPOFOL 10 MG/ML
0-50 INJECTION, EMULSION INTRAVENOUS CONTINUOUS
Status: DISCONTINUED | OUTPATIENT
Start: 2025-06-24 | End: 2025-06-24

## 2025-06-24 RX ORDER — ATORVASTATIN CALCIUM 80 MG/1
80 TABLET, FILM COATED ORAL NIGHTLY
Status: DISCONTINUED | OUTPATIENT
Start: 2025-06-25 | End: 2025-06-24

## 2025-06-24 RX ORDER — PHENYLEPHRINE HYDROCHLORIDE 10 MG/ML
INJECTION INTRAVENOUS AS NEEDED
Status: DISCONTINUED | OUTPATIENT
Start: 2025-06-24 | End: 2025-06-24

## 2025-06-24 RX ORDER — POTASSIUM CHLORIDE 14.9 MG/ML
20 INJECTION INTRAVENOUS EVERY 6 HOURS PRN
Status: DISCONTINUED | OUTPATIENT
Start: 2025-06-24 | End: 2025-06-25

## 2025-06-24 RX ORDER — ATORVASTATIN CALCIUM 80 MG/1
80 TABLET, FILM COATED ORAL NIGHTLY
Status: DISCONTINUED | OUTPATIENT
Start: 2025-06-24 | End: 2025-06-25

## 2025-06-24 RX ORDER — MAGNESIUM SULFATE HEPTAHYDRATE 40 MG/ML
4 INJECTION, SOLUTION INTRAVENOUS EVERY 6 HOURS PRN
Status: DISCONTINUED | OUTPATIENT
Start: 2025-06-24 | End: 2025-06-25

## 2025-06-24 RX ADMIN — ROCURONIUM BROMIDE 30 MG: 10 INJECTION, SOLUTION INTRAVENOUS at 10:02

## 2025-06-24 RX ADMIN — HYDROMORPHONE HYDROCHLORIDE 0.2 MG: 0.2 INJECTION, SOLUTION INTRAMUSCULAR; INTRAVENOUS; SUBCUTANEOUS at 15:45

## 2025-06-24 RX ADMIN — FENTANYL CITRATE 250 MCG: 50 INJECTION, SOLUTION INTRAMUSCULAR; INTRAVENOUS at 08:37

## 2025-06-24 RX ADMIN — FENTANYL CITRATE 500 MCG: 50 INJECTION, SOLUTION INTRAMUSCULAR; INTRAVENOUS at 09:10

## 2025-06-24 RX ADMIN — ROCURONIUM BROMIDE 20 MG: 10 INJECTION, SOLUTION INTRAVENOUS at 12:00

## 2025-06-24 RX ADMIN — PROPOFOL 20 MG: 10 INJECTION, EMULSION INTRAVENOUS at 10:09

## 2025-06-24 RX ADMIN — OXYCODONE 5 MG: 5 TABLET ORAL at 22:23

## 2025-06-24 RX ADMIN — HEPARIN SODIUM 37000 UNITS: 1000 INJECTION, SOLUTION INTRAVENOUS; SUBCUTANEOUS at 10:34

## 2025-06-24 RX ADMIN — NITROGLYCERIN 200 MCG: 10 INJECTION INTRAVENOUS at 12:20

## 2025-06-24 RX ADMIN — PHENYLEPHRINE HYDROCHLORIDE 100 MCG: 10 INJECTION INTRAVENOUS at 12:11

## 2025-06-24 RX ADMIN — PROPOFOL 50 MG: 10 INJECTION, EMULSION INTRAVENOUS at 09:10

## 2025-06-24 RX ADMIN — Medication 0.02 MCG/KG/MIN: at 13:59

## 2025-06-24 RX ADMIN — NOREPINEPHRINE BITARTRATE 0.03 MCG/KG/MIN: 8 INJECTION, SOLUTION INTRAVENOUS at 14:36

## 2025-06-24 RX ADMIN — ROCURONIUM BROMIDE 100 MG: 10 INJECTION, SOLUTION INTRAVENOUS at 08:05

## 2025-06-24 RX ADMIN — PHENYLEPHRINE HYDROCHLORIDE 200 MCG: 10 INJECTION INTRAVENOUS at 11:03

## 2025-06-24 RX ADMIN — OXYCODONE 5 MG: 5 TABLET ORAL at 16:14

## 2025-06-24 RX ADMIN — ROCURONIUM BROMIDE 20 MG: 10 INJECTION, SOLUTION INTRAVENOUS at 10:49

## 2025-06-24 RX ADMIN — PROPOFOL 50 MG: 10 INJECTION, EMULSION INTRAVENOUS at 08:49

## 2025-06-24 RX ADMIN — ROCURONIUM BROMIDE 50 MG: 10 INJECTION, SOLUTION INTRAVENOUS at 09:19

## 2025-06-24 RX ADMIN — MIDAZOLAM HYDROCHLORIDE 2 MG: 2 INJECTION, SOLUTION INTRAMUSCULAR; INTRAVENOUS at 07:51

## 2025-06-24 RX ADMIN — LIDOCAINE HYDROCHLORIDE 100 MG: 20 INJECTION, SOLUTION INFILTRATION; PERINEURAL at 08:03

## 2025-06-24 RX ADMIN — NOREPINEPHRINE BITARTRATE 8 MCG: 1 INJECTION, SOLUTION, CONCENTRATE INTRAVENOUS at 10:28

## 2025-06-24 RX ADMIN — SODIUM CHLORIDE, SODIUM LACTATE, POTASSIUM CHLORIDE, AND CALCIUM CHLORIDE 500 ML: .6; .31; .03; .02 INJECTION, SOLUTION INTRAVENOUS at 15:30

## 2025-06-24 RX ADMIN — FENTANYL CITRATE 250 MCG: 50 INJECTION, SOLUTION INTRAMUSCULAR; INTRAVENOUS at 08:03

## 2025-06-24 RX ADMIN — Medication 50 PERCENT: at 14:30

## 2025-06-24 RX ADMIN — ROCURONIUM BROMIDE 30 MG: 10 INJECTION, SOLUTION INTRAVENOUS at 12:49

## 2025-06-24 RX ADMIN — NOREPINEPHRINE BITARTRATE 16 MCG: 1 INJECTION, SOLUTION, CONCENTRATE INTRAVENOUS at 13:13

## 2025-06-24 RX ADMIN — ETOMIDATE INJECTION 18 MG: 2 SOLUTION INTRAVENOUS at 08:04

## 2025-06-24 RX ADMIN — PROPOFOL 50 MG: 10 INJECTION, EMULSION INTRAVENOUS at 09:45

## 2025-06-24 RX ADMIN — PHENYLEPHRINE HYDROCHLORIDE 200 MCG: 10 INJECTION INTRAVENOUS at 10:57

## 2025-06-24 RX ADMIN — PHENYLEPHRINE HYDROCHLORIDE 100 MCG: 10 INJECTION INTRAVENOUS at 11:53

## 2025-06-24 RX ADMIN — POTASSIUM CHLORIDE 40 MEQ: 29.8 INJECTION, SOLUTION INTRAVENOUS at 17:19

## 2025-06-24 RX ADMIN — PHENYLEPHRINE HYDROCHLORIDE 200 MCG: 10 INJECTION INTRAVENOUS at 11:17

## 2025-06-24 RX ADMIN — PHENYLEPHRINE HYDROCHLORIDE 400 MCG: 10 INJECTION INTRAVENOUS at 10:54

## 2025-06-24 RX ADMIN — Medication 2 L/MIN: at 19:50

## 2025-06-24 RX ADMIN — ALBUMIN HUMAN 25 G: 0.05 INJECTION, SOLUTION INTRAVENOUS at 21:52

## 2025-06-24 RX ADMIN — ASPIRIN 81 MG: 81 TABLET, CHEWABLE ORAL at 16:20

## 2025-06-24 RX ADMIN — SODIUM CHLORIDE, SODIUM GLUCONATE, SODIUM ACETATE, POTASSIUM CHLORIDE AND MAGNESIUM CHLORIDE 500 ML: 526; 502; 368; 37; 30 INJECTION, SOLUTION INTRAVENOUS at 14:45

## 2025-06-24 RX ADMIN — PROTAMINE SULFATE 370 MG: 10 INJECTION, SOLUTION INTRAVENOUS at 12:21

## 2025-06-24 RX ADMIN — ACETAMINOPHEN 650 MG: 325 TABLET ORAL at 21:38

## 2025-06-24 RX ADMIN — EPINEPHRINE IN SODIUM CHLORIDE 0.02 MCG/KG/MIN: 16 INJECTION INTRAVENOUS at 12:12

## 2025-06-24 RX ADMIN — NITROGLYCERIN 100 MCG: 10 INJECTION INTRAVENOUS at 12:17

## 2025-06-24 RX ADMIN — SUGAMMADEX 200 MG: 100 INJECTION, SOLUTION INTRAVENOUS at 14:37

## 2025-06-24 RX ADMIN — PHENYLEPHRINE HYDROCHLORIDE 200 MCG: 10 INJECTION INTRAVENOUS at 12:05

## 2025-06-24 RX ADMIN — ESMOLOL HYDROCHLORIDE 40 MG: 100 INJECTION, SOLUTION INTRAVENOUS at 08:04

## 2025-06-24 RX ADMIN — PHENYLEPHRINE HYDROCHLORIDE 100 MCG: 10 INJECTION INTRAVENOUS at 11:35

## 2025-06-24 RX ADMIN — PHENYLEPHRINE HYDROCHLORIDE 100 MCG: 10 INJECTION INTRAVENOUS at 12:01

## 2025-06-24 RX ADMIN — HYDROMORPHONE HYDROCHLORIDE 0.2 MG: 0.2 INJECTION, SOLUTION INTRAMUSCULAR; INTRAVENOUS; SUBCUTANEOUS at 17:43

## 2025-06-24 RX ADMIN — INSULIN LISPRO 10 UNITS: 100 INJECTION, SOLUTION INTRAVENOUS; SUBCUTANEOUS at 21:11

## 2025-06-24 RX ADMIN — HEPARIN SODIUM 660 ML: 1000 INJECTION INTRAVENOUS; SUBCUTANEOUS at 08:11

## 2025-06-24 RX ADMIN — ATORVASTATIN CALCIUM 80 MG: 80 TABLET, FILM COATED ORAL at 21:38

## 2025-06-24 RX ADMIN — PHENYLEPHRINE HYDROCHLORIDE 100 MCG: 10 INJECTION INTRAVENOUS at 11:26

## 2025-06-24 RX ADMIN — SODIUM CHLORIDE, SODIUM LACTATE, POTASSIUM CHLORIDE, AND CALCIUM CHLORIDE 500 ML: .6; .31; .03; .02 INJECTION, SOLUTION INTRAVENOUS at 18:26

## 2025-06-24 RX ADMIN — PHENYLEPHRINE HYDROCHLORIDE 400 MCG: 10 INJECTION INTRAVENOUS at 11:00

## 2025-06-24 RX ADMIN — INSULIN LISPRO 10 UNITS: 100 INJECTION, SOLUTION INTRAVENOUS; SUBCUTANEOUS at 16:41

## 2025-06-24 RX ADMIN — SODIUM CHLORIDE, SODIUM GLUCONATE, SODIUM ACETATE, POTASSIUM CHLORIDE AND MAGNESIUM CHLORIDE 500 ML: 526; 502; 368; 37; 30 INJECTION, SOLUTION INTRAVENOUS at 14:55

## 2025-06-24 RX ADMIN — ATROPINE SULFATE 1 MG: 1 INJECTION, SOLUTION INTRAMUSCULAR; INTRAVENOUS; SUBCUTANEOUS at 12:13

## 2025-06-24 RX ADMIN — LIDOCAINE HYDROCHLORIDE 100 MG: 20 INJECTION INTRAVENOUS at 12:04

## 2025-06-24 RX ADMIN — SODIUM CHLORIDE, SODIUM GLUCONATE, SODIUM ACETATE, POTASSIUM CHLORIDE AND MAGNESIUM CHLORIDE: 526; 502; 368; 37; 30 INJECTION, SOLUTION INTRAVENOUS at 09:00

## 2025-06-24 RX ADMIN — PHENYLEPHRINE HYDROCHLORIDE 100 MCG: 10 INJECTION INTRAVENOUS at 11:56

## 2025-06-24 RX ADMIN — ALBUMIN HUMAN 25 G: 0.05 INJECTION, SOLUTION INTRAVENOUS at 23:35

## 2025-06-24 RX ADMIN — SODIUM CHLORIDE: 0.9 INJECTION, SOLUTION INTRAVENOUS at 09:10

## 2025-06-24 RX ADMIN — SODIUM CHLORIDE, SODIUM GLUCONATE, SODIUM ACETATE, POTASSIUM CHLORIDE AND MAGNESIUM CHLORIDE: 30; 37; 368; 526; 502 INJECTION, SOLUTION INTRAVENOUS at 07:35

## 2025-06-24 RX ADMIN — CEFAZOLIN SODIUM 2 G: 2 INJECTION, SOLUTION INTRAVENOUS at 21:38

## 2025-06-24 RX ADMIN — ACETAMINOPHEN 650 MG: 325 TABLET ORAL at 16:20

## 2025-06-24 RX ADMIN — SODIUM CHLORIDE, SODIUM GLUCONATE, SODIUM ACETATE, POTASSIUM CHLORIDE AND MAGNESIUM CHLORIDE: 526; 502; 368; 37; 30 INJECTION, SOLUTION INTRAVENOUS at 07:35

## 2025-06-24 RX ADMIN — SODIUM CHLORIDE, SODIUM LACTATE, POTASSIUM CHLORIDE, AND CALCIUM CHLORIDE 5 ML/HR: .6; .31; .03; .02 INJECTION, SOLUTION INTRAVENOUS at 15:46

## 2025-06-24 RX ADMIN — LIDOCAINE 4% 1 PATCH: 40 PATCH TOPICAL at 16:13

## 2025-06-24 RX ADMIN — TRANEXAMIC ACID 1371 MG: 100 INJECTION INTRAVENOUS at 08:55

## 2025-06-24 RX ADMIN — MAGNESIUM SULFATE HEPTAHYDRATE 2 G: 500 INJECTION, SOLUTION INTRAMUSCULAR; INTRAVENOUS at 12:04

## 2025-06-24 RX ADMIN — SODIUM CHLORIDE, SODIUM LACTATE, POTASSIUM CHLORIDE, AND CALCIUM CHLORIDE 30 ML/HR: .6; .31; .03; .02 INJECTION, SOLUTION INTRAVENOUS at 15:46

## 2025-06-24 RX ADMIN — PHENYLEPHRINE HYDROCHLORIDE 100 MCG: 10 INJECTION INTRAVENOUS at 11:32

## 2025-06-24 RX ADMIN — CEFAZOLIN 2 G: 330 INJECTION, POWDER, FOR SOLUTION INTRAMUSCULAR; INTRAVENOUS at 08:58

## 2025-06-24 RX ADMIN — SODIUM BICARBONATE 25 MEQ: 84 INJECTION INTRAVENOUS at 11:28

## 2025-06-24 RX ADMIN — EPINEPHRINE 16 MCG: 1 INJECTION INTRAMUSCULAR; INTRAVENOUS; SUBCUTANEOUS at 12:11

## 2025-06-24 RX ADMIN — HYDROMORPHONE HYDROCHLORIDE 0.5 MG: 0.5 INJECTION, SOLUTION INTRAMUSCULAR; INTRAVENOUS; SUBCUTANEOUS at 16:41

## 2025-06-24 RX ADMIN — PHENYLEPHRINE HYDROCHLORIDE 100 MCG: 10 INJECTION INTRAVENOUS at 11:43

## 2025-06-24 RX ADMIN — CALCIUM CHLORIDE 1 G: 100 INJECTION, SOLUTION INTRAVENOUS at 12:10

## 2025-06-24 RX ADMIN — MUPIROCIN: 20 OINTMENT TOPICAL at 21:38

## 2025-06-24 RX ADMIN — PHENYLEPHRINE HYDROCHLORIDE 200 MCG: 10 INJECTION INTRAVENOUS at 12:13

## 2025-06-24 RX ADMIN — CEFAZOLIN 2 G: 330 INJECTION, POWDER, FOR SOLUTION INTRAMUSCULAR; INTRAVENOUS at 12:58

## 2025-06-24 RX ADMIN — PHENYLEPHRINE HYDROCHLORIDE 200 MCG: 10 INJECTION INTRAVENOUS at 12:04

## 2025-06-24 SDOH — HEALTH STABILITY: MENTAL HEALTH: CURRENT SMOKER: 0

## 2025-06-24 ASSESSMENT — PAIN DESCRIPTION - LOCATION
LOCATION: CHEST
LOCATION: CHEST

## 2025-06-24 ASSESSMENT — COLUMBIA-SUICIDE SEVERITY RATING SCALE - C-SSRS
1. IN THE PAST MONTH, HAVE YOU WISHED YOU WERE DEAD OR WISHED YOU COULD GO TO SLEEP AND NOT WAKE UP?: NO
6. HAVE YOU EVER DONE ANYTHING, STARTED TO DO ANYTHING, OR PREPARED TO DO ANYTHING TO END YOUR LIFE?: NO
2. HAVE YOU ACTUALLY HAD ANY THOUGHTS OF KILLING YOURSELF?: NO

## 2025-06-24 ASSESSMENT — PAIN DESCRIPTION - DESCRIPTORS
DESCRIPTORS: DULL
DESCRIPTORS: SORE
DESCRIPTORS: SORE
DESCRIPTORS: SHARP
DESCRIPTORS: ACHING;SORE

## 2025-06-24 ASSESSMENT — PAIN SCALES - GENERAL
PAINLEVEL_OUTOF10: 0 - NO PAIN
PAINLEVEL_OUTOF10: 9
PAINLEVEL_OUTOF10: 5 - MODERATE PAIN
PAINLEVEL_OUTOF10: 7
PAINLEVEL_OUTOF10: 2
PAINLEVEL_OUTOF10: 7
PAINLEVEL_OUTOF10: 3
PAIN_LEVEL: 0

## 2025-06-24 ASSESSMENT — PAIN - FUNCTIONAL ASSESSMENT
PAIN_FUNCTIONAL_ASSESSMENT: 0-10
PAIN_FUNCTIONAL_ASSESSMENT: UNABLE TO SELF-REPORT
PAIN_FUNCTIONAL_ASSESSMENT: 0-10
PAIN_FUNCTIONAL_ASSESSMENT: 0-10

## 2025-06-24 NOTE — ANESTHESIA PREPROCEDURE EVALUATION
Patient: Yosvany Chase    Procedure Information       Date/Time: 06/24/25 0645    Procedure: Coronary Artery Bypass Graft x 3 Vessels; LIMA; RADIAL; VEIN (Chest) - Approved 6/4    Location: [facility] OR 19 / Virtual [facility] OR    Surgeons: Mei Herr MD            Relevant Problems   Cardiac   (+) Atherosclerosis of native coronary artery of native heart   (+) Chest pain   (+) Hypercholesterolemia      GI  Sigmoid diverticulosis      Endocrine   (+) Obesity      ID   (+) Recurrent cold sores       Clinical information reviewed:   Tobacco  Allergies  Meds   Med Hx  Surg Hx   Fam Hx  Soc Hx        NPO Detail:  NPO/Void Status  Carbohydrate Drink Given Prior to Surgery? : N  Date of Last Liquid: 06/23/25  Time of Last Liquid: 2300  Date of Last Solid: 06/23/25  Time of Last Solid: 2300  Last Intake Type: Clear fluids  Time of Last Void: 0620         Physical Exam    Airway  Mallampati: III  TM distance: >3 FB  Neck ROM: full  Mouth opening: 3 or more finger widths     Cardiovascular   Rhythm: regular     Dental    Pulmonary Breath sounds clear to auscultation     Abdominal            Anesthesia Plan    History of general anesthesia?: yes  History of complications of general anesthesia?: no    ASA 4     general     The patient is not a current smoker.    intravenous induction   Postoperative pain plan includes opioids.  Anesthetic plan and risks discussed with patient.  Use of blood products discussed with patient who consented to blood products.    Plan discussed with CRNA.

## 2025-06-24 NOTE — ANESTHESIA POSTPROCEDURE EVALUATION
Patient: Yosvany Chase    Procedure Summary       Date: 06/24/25 Room / Location: [facility] OR 19 / Virtual [facility] OR    Anesthesia Start: 0735 Anesthesia Stop: 1445    Procedure: CABG X3 LIMA TO LAD; SVG TO OM TO PDA, LEFT ATRIAL APPENDAGE LIGATION USING ATRICLIP [id number] (Chest) Diagnosis:       Atherosclerosis of native coronary artery of native heart, unspecified whether angina present      (Atherosclerosis of native coronary artery of native heart, unspecified whether angina present [I25.10])    Surgeons: Mei Herr MD Responsible Provider: Anupama Phan MD    Anesthesia Type: general ASA Status: 4            Anesthesia Type: general    Vitals Value Taken Time   /60 06/24/25 14:46   Temp  06/24/25 14:46   Pulse 73 06/24/25 14:45   Resp 16 06/24/25 14:45   SpO2 100 % 06/24/25 14:45   Vitals shown include unfiled device data.    Anesthesia Post Evaluation    Patient location during evaluation: ICU  Patient participation: complete - patient cannot participate  Level of consciousness: sedated  Pain score: 0  Pain management: adequate  Airway patency: patent  Cardiovascular status: acceptable  Respiratory status: acceptable  Hydration status: acceptable  Postoperative Nausea and Vomiting: none        No notable events documented.

## 2025-06-24 NOTE — BRIEF OP NOTE
Date: 2025  OR Location: [facility] OR    Name: Yosvany Chase, : 1950, Age: 74 y.o., MRN: [id number], Sex: male    Diagnosis  Pre-op Diagnosis      * Atherosclerosis of native coronary artery of native heart, unspecified whether angina present [I25.10] Post-op Diagnosis     * Atherosclerosis of native coronary artery of native heart, unspecified whether angina present [I25.10]     Procedures  Coronary Artery Bypass Graft x 3 Vessels; LIMA; RADIAL; VEIN  85944 - OR CABG W/ARTERIAL GRAFT THREE ARTERIAL GRAFTS  EVH Right Saphenous Vein  CABG x 3, LIMA to LAD 30ml/3.0 PI, SVG to PDA sequenced to OM 63 ml/3.3 PI  Left Atrial Appendage Ligation w/ 45mm AtriClip      Chest Tubes/Drains: Left Pleural, Mediastinal x 2       Temporary Pacing Wires: A and V wires     -Settings: backup DDD 60   -Underlying Rhythm: sinus        Sternotomy performed by: Jah STRONG    Conduit Harvested by: Dionicio PEREZ      Sternal Wires placed by: Dionicio PEREZ    Arm/Leg/Groin Closure/Cutdown performed by: Darline PEREZ      Cardio Pulmonary Bypass Time: 86 min  Cross-clamp Time: 67 min      Is patient candidate for Emergency Re-sternotomy? Yes  / -If yes, POD #10 is - 7/4      Surgeons      * Mei Herr - Primary    Resident/Fellow/Other Assistant:  Surgeons and Role:     * Lanie Tyson MD - Assisting  Darline PEREZ      Staff:   Circulator: Ladi Rodriguez Person: Ena  Surgical Assistant: Lamin  Surgical Assistant: Lydia  Circulator: [facility]    Anesthesia Staff: Anesthesiologist: Anupama Phan MD  CRNA: KAREEM Weaver-CRNA  C-AA: JARRED Reynaga  Perfusionist: Yessica Orellana    Procedure Summary  Anesthesia: General  ASA: IV  Estimated Blood Loss: 250mL  Intra-op Medications:   Administrations occurring from 0645 to 1410 on 25:   Medication Name Total Dose   heparin 1,000 unit/mL injection 47,000 Units   sodium chloride 0.9 % irrigation solution 4,000 mL    papaverine injection 180 mg   vancomycin (Vancocin) vial for injection 4 g   electrolyte-A (Plasmalyte-A) solution 1,000 mL   atropine 1 mg/mL 1 mg   calcium chloride 100 mg/mL syringe 1 g   ceFAZolin (Ancef) vial 1 g 2 g   electrolyte-A (Plasmalyte-A) solution Cannot be calculated   electrolyte-A (Plasmalyte-A) Cannot be calculated   EPINEPHrine (Adrenalin) 16 mcg/mL syringe - compounded 16 mcg   EPINEPHrine (Adrenalin) 4 mg in sodium chloride 0.9% 250 mL (16 mcg/mL) infusion (premix) 0.22 mg   esmolol (Brevibloc) injection 40 mg   etomidate (Amidate) injection 18 mg   fentaNYL (Sublimaze) injection 50 mcg/mL 1,000 mcg   lidocaine (cardiac) injection 2% prefilled syringe 100 mg   lidocaine (Xylocaine) injection 2 % 100 mg   magnesium sulfate 50 % injection 2 g   midazolam PF (Versed) injection 1 mg/mL 2 mg   nitroglycerin 100 mcg/mL D5W intracoronary syringe - compounded 300 mcg   norepinephrine (Levophed) 16 mcg/mL syringe for Anesthesia 8 mcg   perfusion prime builder 660 mL   phenylephrine (Abhijeet-Synephrine) injection 2,800 mcg   propofol (Diprivan) injection 10 mg/mL 170 mg   protamine injection 370 mg   rocuronium (ZeMuron) 50 mg/5 mL injection 220 mg   sodium bicarbonate injection 1 mEq/mL 25 mEq   sodium chloride 0.9 % infusion 250 mL   tranexamic acid (Cyklokapron) 5,000 mg in sodium chloride 0.9% 250 mL (20 mg/mL) infusion 2,719.15 mg              Anesthesia Record               Intraprocedure I/O Totals          Intake    Tranexamic Acid 0.00 mL    The total shown is the total volume documented since Anesthesia Start was filed.    Epinephrine Drip 0.00 mL    The total shown is the total volume documented since Anesthesia Start was filed.    perfusion prime builder 660.00 mL    Total Intake 660 mL       Output    Urine 260 mL    Total Output 260 mL       Net    Net Volume 400 mL          Specimen: No specimens collected               Findings: *    Complications:  None; patient tolerated the procedure  well.     Disposition: ICU - intubated and hemodynamically stable.  Condition: stable  Specimens Collected: No specimens collected  Attending Attestation: I performed the procedure.    Mei Herr  Phone Number: [phone]

## 2025-06-24 NOTE — ANESTHESIA PROCEDURE NOTES
Central Venous Line:    Date/Time: 6/24/2025 8:40 AM    A central venous line was placed in the OR for the following indication(s): central venous access and CVP monitoring.  Staffing  Performed: [facility], attending and CRNA   Authorized by: Anupama Phan MD    Performed by: KAREEM Weaver-CRNA    Sterility preparation included the following: provider hand hygiene performed prior to central venous catheter insertion, all 5 sterile barriers used (gloves, gown, cap, mask, large sterile drape) during central venous catheter insertion, antiseptic used during central venous catheter insertion and skin prep agent completely dried prior to procedure.  Medical reason for not performing maximal sterile barrier technique: no  The patient was placed in Trendelenburg position.    Right internal jugular vein was prepped.    The site was prepped with Chlorhexidine.  Size: 9 Fr (8 Fr)   Length: 11.5  Catheter type: central venous catheter   Number of Lumens: double lumen      During the procedure, the following specific steps were taken: target vein identified, needle advanced into vein and blood aspirated and guidewire advanced into vein.  Seldinger technique used.  Procedure performed using ultrasound guidance.  Sterile gel and probe cover used in ultrasound-guided central venous catheter insertion.    Intravenous verification was obtained by ultrasound, venous blood return and manometry.      Post insertion care included: all ports aspirated, all ports flushed easily, guidewire removed intact, Biopatch applied, line sutured in place and dressing applied.    During the procedure the patient experienced: patient tolerated procedure well with no complications.           images stored in chart

## 2025-06-24 NOTE — ANESTHESIA PROCEDURE NOTES
Airway  Date/Time: 6/24/2025 8:07 AM  Reason: elective    Airway not difficult    Staffing  Performed: CRNA   Authorized by: Anupama Phan MD    Performed by: KAREEM Weaver-INOCENTE  Patient location during procedure: OR    Patient Condition  Indications for airway management: anesthesia and airway protection  Patient position: sniffing  MILS maintained throughout  Sedation level: no sedation     Final Airway Details   Preoxygenated: yes  Final airway type: endotracheal airway  Successful airway: ETT  Cuffed: yes   Successful intubation technique: direct laryngoscopy  Blade: Mariana  Blade size: #4  ETT size (mm): 7.5  Cormack-Lehane Classification: grade I - full view of glottis  Placement verified by: chest auscultation and capnometry   Measured from: lips  ETT to lips (cm): 21  Number of attempts at approach: 1

## 2025-06-24 NOTE — ANESTHESIA PROCEDURE NOTES
Arterial Line:    Date/Time: 6/24/2025 7:54 AM    Staffing  Performed: [facility]   Authorized by: Anupama Phan MD    Performed by: VLADISLAV Weaver    An arterial line was placed. Procedure performed using ultrasound guidance.in the OR for the following indication(s): continuous blood pressure monitoring and blood sampling needed.    A 20 gauge (size), 12 cm (length), Arrow (type) catheter was placed into the Right brachial artery, secured by Tegaderm,   Seldinger technique used.  Events:  patient tolerated procedure well with no complications.       images stored in chart

## 2025-06-24 NOTE — ANESTHESIA PROCEDURE NOTES
Peripheral IV  Date/Time: 6/24/2025 8:10 AM  Inserted by: Obie Summers    Placement  Needle size: 14 G  Laterality: left  Location: wrist  Local anesthetic: none  Site prep: alcohol  Technique: anatomical landmarks  Attempts: 1

## 2025-06-24 NOTE — H&P
CTICU History & Physical    Subjective   HPI:  Ifrah Henry is a 75 y/o male with PMH of hyperlipidemia, GERD, lumbar spine stenosis, phlebitis of leg, dermatitis, left subclavian stenosis and elevated CAC score (1408 2/5/2025) followed by + Nuclear stress test,  [facility] performed 5/23/25 with LAD, LCX and RCA disease. He is now s/p CABGx   with Dr Herr.       Cardiac Testing:     TTE:  Results for orders placed during the hospital encounter of 05/28/25    Transthoracic Echo Complete    Narrative  [facility], [address] [place] [address]  Tel [phone] and Fax [phone]    TRANSTHORACIC ECHOCARDIOGRAM REPORT      Patient Name:       IFRAH HENRY    Reading Physician:    [id number] Calderon MD  Study Date:         5/28/2025           Ordering Provider:    [id number] REN CLOUD  MRN/PID:            [id number]            Fellow:  Accession#:         [id number]        Nurse:  Date of Birth/Age:  1950 / 74      Sonographer:          Megan lawton RDCS  Gender assigned at                     Additional Staff:  Birth:  Height:             165.00 cm           Admit Date:  Weight:             96.00 kg            Admission Status:     Outpatient  BSA / BMI:          2.03 m2 / 35.26     Encounter#:           [id number]  kg/m2  Blood Pressure:     135/60 mmHg         Department Location:  [facility] Non  Invasive    Study Type:    TRANSTHORACIC ECHO (TTE) COMPLETE  Diagnosis/ICD: Abnormal result of other cardiovascular function study-R94.39;  Encounter for preprocedural cardiovascular examination-Z01.810  Indication:    abn stress, pre-CABG  CPT Code:      Echo Complete w Full Doppler-20900    Patient History:  Pertinent History: Hyperlipidemia, Chest Pain and abn stress test, pre-op.    Study Detail: The following Echo studies were performed: 2D, M-Mode, Doppler and  color flow. Technically challenging study due to body habitus.      PHYSICIAN  INTERPRETATION:  Left Ventricle: Left ventricular ejection fraction is normal calculated by Noriega's biplane at 70%. There are no regional left ventricular wall motion abnormalities. The left ventricular cavity size is normal. There is normal septal and normal posterior left ventricular wall thickness. There is left ventricular concentric remodeling. Spectral Doppler shows a Grade I (impaired relaxation pattern) of left ventricular diastolic filling with normal left atrial filling pressure.  Left Atrium: The left atrial size is normal.  Right Ventricle: The right ventricle is normal in size. There is normal right ventricular global systolic function.  Right Atrium: The right atrium is normal in size.  Aortic Valve: The aortic valve is trileaflet. The aortic valve area by VTI is 2.88 cmï¿½ with a peak velocity of 1.32 m/s. The peak and mean gradients are 7 mmHg and 3 mmHg, respectively, with a dimensionless index of 0.87. There is no evidence of aortic valve regurgitation.  Mitral Valve: The mitral valve is normal in structure. There is no evidence of mitral valve regurgitation. The E Vmax is 0.85 m/s.  Tricuspid Valve: The tricuspid valve is structurally normal. No evidence of tricuspid regurgitation. The right ventricular systolic pressure could not be estimated.  Pulmonic Valve: The pulmonic valve is structurally normal. There is no indication of pulmonic valve regurgitation.  Pericardium: There is no pericardial effusion noted.  Aorta: The aortic root is normal.  Systemic Veins: The inferior vena cava appears normal in size, with IVC inspiratory collapse greater than 50%.  In comparison to the previous echocardiogram(s): There are no prior studies on this patient for comparison purposes.      CONCLUSIONS:  1. Left ventricular ejection fraction is normal calculated by Noriega's biplane at 70%.  2. Spectral Doppler shows a Grade I (impaired relaxation pattern) of left ventricular diastolic filling with normal left  atrial filling pressure.  3. Poorly visualized anatomical structures due to suboptimal image quality.  4. There is normal right ventricular global systolic function.    QUANTITATIVE DATA SUMMARY:    2D MEASUREMENTS:          Normal Ranges:  LAs:             3.42 cm  (2.7-4.0cm)  IVSd:            0.91 cm  (0.6-1.1cm)  LVPWd:           0.95 cm  (0.6-1.1cm)  LVIDd:           3.72 cm  (3.9-5.9cm)  LVIDs:           2.06 cm  LV Mass Index:   50 g/m2  LVEDV Index:     39 ml/m2  LV % FS          44.5 %      LEFT ATRIUM:                  Normal Ranges:  LA Vol A4C:        40.7 ml    (22+/-6mL/m2)  LA Vol A2C:        41.6 ml  LA Vol BP:         43.4 ml  LA Vol Index A4C:  20.1 ml/m2  LA Vol Index A2C:  20.5 ml/m2  LA Vol Index BP:   21.4 ml/m2  LA Area A4C:       16.8 cm2  LA Area A2C:       16.1 cm2  LA Major Axis A4C: 5.9 cm  LA Major Axis A2C: 5.3 cm  LA Volume Index:   21.4 ml/m2  LA Vol A4C:        38.4 ml  LA Vol A2C:        40.6 ml  LA Vol Index BSA:  19.5 ml/m2      RIGHT ATRIUM:                 Normal Ranges:  RA Vol A4C:        31.9 ml    (8.3-19.5ml)  RA Vol Index A4C:  15.7 ml/m2  RA Area A4C:       12.7 cm2  RA Major Axis A4C: 4.3 cm      AORTA MEASUREMENTS:         Normal Ranges:  Ao Sinus, d:        3.10 cm (2.1-3.5cm)  Ao STJ, d:          2.40 cm (1.7-3.4cm)  Asc Ao, d:          3.70 cm (2.1-3.4cm)      LV SYSTOLIC FUNCTION:  Normal Ranges:  EF-A4C View:    72 % (>=55%)  EF-A2C View:    69 %  EF-Biplane:     70 %  LV EF Reported: 70 %      LV DIASTOLIC FUNCTION:             Normal Ranges:  MV Peak E:             0.85 m/s    (0.7-1.2 m/s)  MV Peak A:             0.79 m/s    (0.42-0.7 m/s)  E/A Ratio:             1.07        (1.0-2.2)  MV e'                  0.090 m/s   (>8.0)  MV lateral e'          0.11 m/s  MV medial e'           0.07 m/s  MV A Dur:              121.79 msec  E/e' Ratio:            9.42        (<8.0)  PulmV Sys Peter:         51.32 cm/s  PulmV Mathew Peter:        31.58 cm/s  PulmV S/D Peter:          1.62  PulmV A Revs Peter:      24.08 cm/s  PulmV A Revs Dur:      112.27 msec      MITRAL VALVE:          Normal Ranges:  MV DT:        308 msec (150-240msec)      AORTIC VALVE:                     Normal Ranges:  AoV Vmax:                1.32 m/s (<=1.7m/s)  AoV Peak P.0 mmHg (<20mmHg)  AoV Mean PG:             3.1 mmHg (1.7-11.5mmHg)  LVOT Max Peter:            1.00 m/s (<=1.1m/s)  AoV VTI:                 27.72 cm (18-25cm)  LVOT VTI:                24.07 cm  LVOT Diameter:           2.05 cm  (1.8-2.4cm)  AoV Area, VTI:           2.88 cm2 (2.5-5.5cm2)  AoV Area,Vmax:           2.52 cm2 (2.5-4.5cm2)  AoV Dimensionless Index: 0.87      RIGHT VENTRICLE:  RV Basal 3.30 cm  RV Mid   2.30 cm  RV Major 6.8 cm  TAPSE:   24.0 mm  RV s'    0.12 m/s      TRICUSPID VALVE/RVSP:         Normal Ranges:  Est. RA Pressure:     3  IVC Diam:             1.80 cm      PULMONIC VALVE:          Normal Ranges:  PV Accel Time:  131 msec (>120ms)  PV Max Peter:     0.7 m/s  (0.6-0.9m/s)  PV Max P.2 mmHg      PULMONARY VEINS:  PulmV A Revs Dur: 112.27 msec  PulmV A Revs Peter: 24.08 cm/s  PulmV Mathew Peter:   31.58 cm/s  PulmV S/D Peter:    1.62  PulmV Sys Peter:    51.32 cm/s      AORTA:  Asc Ao Diam 3.73 cm      [id number] Ken Calderon MD  Electronically signed on 2025 at 9:59:24 PM        ** Final **     LHC:  Vessel       Stenosis     Vessel Segment  LAD        70% stenosis ostial and proximal  LAD        70% stenosis         mid  Circumflex 30% stenosis      proximal  Circumflex 60% stenosis         mid  RCA        80% stenosis      proximal  RCA        95% stenosis         mid        Procedure/Surgeon: CABG/Dr Herr  Frontliner/Anesthesia: Sylvester  Out of OR Time (document on ventilator card): 1430     OR Course/Issues: right SVG, CABG x 3, LIMA to LAD 30ml/3.0 PI, SVG to PDA sequenced to OM 63 ml/3.3 PI , JAMZIN ligation     CPB time: 86min  Cross clamp time: 67 min  Echo Pre/Post: Dilated RV pre- normal LV, post Normal fxn  biventricularly  Chest Tubes/Drains: 2 med 1 left pleural   Temporary wires location/setting: AV wires 6-      Fluids  Crystalloid: 1.3L  Cellsaver: 1032mL  UOP: 360mL     Anesthesia  Intubation: Mac 4 Grade I  Intravenous Access: right IJ   Benzodiazepine dose/last administration: 2mg midazolam total  Opioid dose/last administration: 1000 mcg fentanyl total  Reversal given on arrival  Antibiotic time: ancef 2g 1300  Temperature on admission to ICU: 36.8    Medical History[1]  Surgical History[2]  Prescriptions Prior to Admission[3]  Crestor [rosuvastatin] and Vicodin [hydrocodone-acetaminophen]  Social History[4]  Family History[5]    Review of Systems:  Unable to obtain    Objective   Vitals:  Most Recent:  Vitals:    06/24/25 0619   BP: 157/70   Pulse: 63   Resp: 16   Temp: 36.1 °C (97 °F)   SpO2: 97%       24hr Min/Max:  Temp  Min: 36.1 °C (97 °F)  Max: 36.1 °C (97 °F)  Pulse  Min: 63  Max: 63  BP  Min: 157/70  Max: 157/70  Resp  Min: 16  Max: 16  SpO2  Min: 97 %  Max: 97 %    I/O:  No intake/output data recorded.    LDA:  CVC 06/24/25 Double lumen Right Internal jugular (Active)   Placement Date/Time: 06/24/25 (c) 0840   Hand Hygiene Performed Prior to CVC Insertion: Yes  Site Prep: Chlorhexidine   Site Prep Agent has Completely Dried Before Insertion: Yes  All 5 Sterile Barriers Used (Gloves, Gown, Cap, Mask, Large Sterile Sandra...   Number of days: 0       Arterial Line 06/24/25 Right Brachial (Active)   Placement Date/Time: 06/24/25 (c) 0751   Size: 20 G  Orientation: Right  Location: Brachial  Securement Method: Transparent dressing  Patient Tolerance: Tolerated well   Number of days: 0       ETT  7.5 mm (Active)   Placement Date/Time: 06/24/25 (c) 0807   Mask Ventilation: Vent by mask  Technique: Direct laryngoscopy  ETT Type: ETT - single  Single Lumen Tube Size: 7.5 mm  Cuffed: Yes  Laryngoscope: Mariana  Blade Size: 4  Grade View: Full view of the glottis ...   Number of days: 0       Urethral Catheter  Non-latex 14 Fr. (Active)   Placement Date: 06/24/25   Placed by: GILMA VALLADARES  Hand Hygiene Completed: Yes  Catheter Type: Non-latex  Tube Size (Fr.): 14 Fr.  Catheter Balloon Size: 10 mL  Urine Returned: Yes   Number of days: 0       Physical Exam:   - CONSTITUTION: intubated and sedated   - NEUROLOGIC: moving all extremities as awakening  - CARDIOVASCULAR: NSR, AV wires backup VVI 50, midsternal incision stable to palpation  - RESPIRATORY: intubated with equal chest expansion, diminished breath sounds  - GI: obese, soft  - : gerard with yellow urine  - EXTREMITIES: cool and dry, BLE with + signals  - SKIN: cool and dry  - ENT: mouth with some bleeding near ETT  - PSYCHIATRIC: unable to assess    Lab Review:  Results from last 7 days   Lab Units 06/20/25  1409   WBC AUTO x10*3/uL 6.6   HEMOGLOBIN g/dL 14.1   HEMATOCRIT % 43.4   PLATELETS AUTO x10*3/uL 199     Results from last 7 days   Lab Units 06/20/25  1409   SODIUM mmol/L 137   POTASSIUM mmol/L 4.4   CHLORIDE mmol/L 103   CO2 mmol/L 27   BUN mg/dL 23   CREATININE mg/dL 0.87   CALCIUM mg/dL 9.1   PROTEIN TOTAL g/dL 6.9   BILIRUBIN TOTAL mg/dL 0.6   ALK PHOS U/L 65   ALT U/L 15   AST U/L 18   GLUCOSE mg/dL 83         Results from last 7 days   Lab Units 06/24/25  1126   POCT PH, ARTERIAL pH 7.35*   POCT PCO2, ARTERIAL mm Hg 43*   POCT PO2, ARTERIAL mm Hg 350*   POCT HCO3 CALCULATED, ARTERIAL mmol/L 23.7   POCT BASE EXCESS, ARTERIAL mmol/L -1.9       Most recent labs and imaging reviewed.    Daily Risk Screen  Intubated: yes--> plan for extubation today  Central line: yes-> pressors  Gerard: yes--> remain for today    Assessment/Plan     Assessment:    Plan:  NEURO:  Patient is intubated and sedated on propofol infusion. Acute post operative pain.  No focal deficits -->  - Serial neuro and pain assessments   - Continue propofol  then daily sedation vacation at minimum   - NMB administered on arrival to ICU  - Scheduled Tylenol   - PRN oxycodone  - PRN  dilaudid for pain   - PT Consult, OOB to chair as tolerated, chair position if not tolerated   - CAM ICU score qshift  - Sleep/wake cycle hygiene     CV:  history of HLD and CAD Is now status post CABGx3, JAZMIN Pre/Post EF: Normal Arrived to CTICU on Epi 0.02 mcg/kg/min and Levophed 0.02 mcg/kg/min A/V epicardial wires set VVI 50 currently NSR in the 70s.-->  - Maintain goal MAP >65, wean epi and levo as tolerated  - Volume resuscitate as clinically indicated  - Maintain epicardial wires set VVI 50   - Start asa  - Does have documented myalgias with Crestor, will start atorvastatin 80mg and discuss tolerance with current pravastatin when awake  - Hold home Imdur     PULM:  No history of pulmonary disease. Currently intubated on ventilator. Chest tubes 2 med and 1 right pleural.-->  - F/u post op CXR  - Once reversed, wean ventilator settings towards CPAP & extubation   - Wean FiO2 maintaining SpO2 >92%.   - IS q1h and OOB to chair when extubated  - Chest tubes to wall suction.    GI:  PMH of GERD not on home meds.  OG in place.-->  - Continue PPI until extubated  - NPO, will perform bedside swallow eval post extubation   - Colace/senna BID and miralax BID     :  CSA-AVTAR Risk Score low.  No history of renal disease, baseline creatinine 0.87. Creatinine stable post-op. Gerard in place and making adequate UOP. -->  - Continue gerard catheter for strict I/Os  - RFP as clinically indicated  - Replete electrolytes per CTICU protocol     ENDO: -->  - Maintain BG <180, insulin per CTICU protocol     HEME:  Acute blood loss anemia-->    - Monitor drain output volume and characteristics  - CBC post op and as clinically indicated  - Start ASA 6hrs post-op for CABG  - SQH tomorrow   - SCDs for DVT prophylaxis.  - Last type and screen: 6/24     ID:  leukocytosis likely reactive. MSSA of nares.-->  - Trend temp q4h  - Periop cefazolin x 48hrs  - Mupirocin given MSSA colonization     Skin:  No active skin issues.  - preventative  Mepilex dressings in place on sacrum and heels  - change preventative Mepilex weekly or more frequently as indicated (when moist/soiled)   - every shift skin assessment per nursing and weekly ICU skin rounds  - moisture barrier to be applied with melina care  - active skin problems addressed with nursing on daily rounds     Proph:  SCDs  PPI     G:  Line  Right IJ MAC w Minimac placed 6/24  Right brachial a-line placed 6/24    F: Family: will update at bedside postoperatively.     A,B,C,D,E,F,G: reviewed    I personally spent 70 minutes of critical care time directly and personally managing the patient exclusive of separately billable procedures.     Dispo: CTICU care for now.    CTICU TEAM PHONE [id number]           [1]   Past Medical History:  Diagnosis Date    Agatston CAC score, >400 02/05/2025    Total 1408.02: LM 73.15   .9   LCx 791.52   .45    Atherosclerosis of native coronary artery of native heart, unspecified whether angina present     Plan: Coronary Artery Bypass Graft x 3 Vessels; LIMA; RADIAL; VEIN 6/24/25    Cardiology follow-up encounter     Omer Pringle MD LOV 4/23/25    Diverticulosis, sigmoid     H/O echocardiogram 05/28/2025    CONCLUSIONS:   1. Left ventricular ejection fraction is normal calculated by Noriega's biplane at 70%.   2. Spectral Doppler shows a Grade I (impaired relaxation pattern) of left ventricular diastolic filling with normal left atrial filling pressure.   3. Poorly visualized anatomical structures due to suboptimal image quality.   4. There is normal right ventricular global systolic function.    History of cardiovascular stress test 05/08/2025    1. Note is made of a partially fixed defect along the inferior wall.   Findings are concerning for prior infarct particularly along the basal aspect the inferior wall with moderate melina-infarct ischemia along the mid to distal aspect.   2. The left ventricle is normal in size.   3. Decreased wall motion & thickening  particularly along the basal to mid inferior wall with an LV EF estimated at 63%.    Hyperlipidemia     Obesity     S/P cardiac cath 05/23/2025    Vitamin D deficiency    [2]   Past Surgical History:  Procedure Laterality Date    CARDIAC CATHETERIZATION N/A 05/23/2025    Procedure: LHC, With LV;  Surgeon: Omer Pringle MD;  [facility]: [facility] Cardiac Cath Lab;  Service: Cardiovascular;  Laterality: N/A;    COLONOSCOPY      ROTATOR CUFF REPAIR Right    [3]   Medications Prior to Admission   Medication Sig Dispense Refill Last Dose/Taking    aspirin 81 mg EC tablet Take 1 tablet (81 mg) by mouth once daily. 30 tablet 11 6/24/2025 Morning    chlorhexidine (Peridex) 0.12 % solution Swish for 30 seconds and spit 15mL of solution the night before and morning of surgery 475 mL 0 6/24/2025 Morning    cholecalciferol (Vitamin D-3) 50 mcg (2,000 unit) capsule Take 1 capsule (50 mcg) by mouth once daily.   Past Week    isosorbide mononitrate ER (Imdur) 30 mg 24 hr tablet Take 1 tablet (30 mg) by mouth once daily. Do not crush or chew. 90 tablet 1 6/23/2025    multivitamin with minerals tablet Take 1 tablet by mouth once daily.   Past Week    pravastatin (Pravachol) 20 mg tablet Take 1 tablet (20 mg) by mouth once daily. 30 tablet 11 6/24/2025 Morning   [4]   Social History  Tobacco Use    Smoking status: Never    Smokeless tobacco: Never   Vaping Use    Vaping status: Never Used   Substance Use Topics    Alcohol use: Never    Drug use: Never   [5]   Family History  Problem Relation Name Age of Onset    Atrial fibrillation Other      Prostate cancer Other

## 2025-06-25 ENCOUNTER — APPOINTMENT (OUTPATIENT)
Dept: RADIOLOGY | Facility: HOSPITAL | Age: 75
DRG: 303 | End: 2025-06-25
Payer: MEDICARE

## 2025-06-25 LAB
ALBUMIN SERPL BCP-MCNC: 4.4 G/DL (ref 3.4–5)
ANION GAP BLDA CALCULATED.4IONS-SCNC: 18 MMO/L (ref 10–25)
ANION GAP BLDA CALCULATED.4IONS-SCNC: 7 MMO/L (ref 10–25)
ANION GAP BLDV CALCULATED.4IONS-SCNC: 11 MMOL/L (ref 10–25)
ANION GAP SERPL CALC-SCNC: 16 MMOL/L (ref 10–20)
ATRIAL RATE: 70 BPM
BASE EXCESS BLDA CALC-SCNC: -7.8 MMOL/L (ref -2–3)
BASE EXCESS BLDA CALC-SCNC: 1.3 MMOL/L (ref -2–3)
BASE EXCESS BLDV CALC-SCNC: -4.7 MMOL/L (ref -2–3)
BODY TEMPERATURE: 37 DEGREES CELSIUS
BUN SERPL-MCNC: 21 MG/DL (ref 6–23)
CA-I BLD-SCNC: 1.05 MMOL/L (ref 1.1–1.33)
CA-I BLDA-SCNC: 1.06 MMOL/L (ref 1.1–1.33)
CA-I BLDA-SCNC: 1.12 MMOL/L (ref 1.1–1.33)
CA-I BLDV-SCNC: 1.07 MMOL/L (ref 1.1–1.33)
CALCIUM SERPL-MCNC: 7.9 MG/DL (ref 8.6–10.6)
CHLORIDE BLDA-SCNC: 107 MMOL/L (ref 98–107)
CHLORIDE BLDA-SCNC: 107 MMOL/L (ref 98–107)
CHLORIDE BLDV-SCNC: 107 MMOL/L (ref 98–107)
CHLORIDE SERPL-SCNC: 108 MMOL/L (ref 98–107)
CO2 SERPL-SCNC: 19 MMOL/L (ref 21–32)
CREAT SERPL-MCNC: 1.13 MG/DL (ref 0.5–1.3)
EGFRCR SERPLBLD CKD-EPI 2021: 69 ML/MIN/1.73M*2
ERYTHROCYTE [DISTWIDTH] IN BLOOD BY AUTOMATED COUNT: 13.5 % (ref 11.5–14.5)
GLUCOSE BLD MANUAL STRIP-MCNC: 118 MG/DL (ref 74–99)
GLUCOSE BLD MANUAL STRIP-MCNC: 136 MG/DL (ref 74–99)
GLUCOSE BLDA-MCNC: 120 MG/DL (ref 74–99)
GLUCOSE BLDA-MCNC: 169 MG/DL (ref 74–99)
GLUCOSE BLDV-MCNC: 156 MG/DL (ref 74–99)
GLUCOSE SERPL-MCNC: 166 MG/DL (ref 74–99)
HCO3 BLDA-SCNC: 17.5 MMOL/L (ref 22–26)
HCO3 BLDA-SCNC: 25.9 MMOL/L (ref 22–26)
HCO3 BLDV-SCNC: 21.6 MMOL/L (ref 22–26)
HCT VFR BLD AUTO: 31.2 % (ref 41–52)
HCT VFR BLD EST: 30 % (ref 41–52)
HCT VFR BLD EST: 31 % (ref 41–52)
HCT VFR BLD EST: 33 % (ref 41–52)
HGB BLD-MCNC: 9.9 G/DL (ref 13.5–17.5)
HGB BLDA-MCNC: 10.1 G/DL (ref 13.5–17.5)
HGB BLDA-MCNC: 10.9 G/DL (ref 13.5–17.5)
HGB BLDV-MCNC: 10.3 G/DL (ref 13.5–17.5)
INHALED O2 CONCENTRATION: 21 %
LACTATE BLDA-SCNC: 1.5 MMOL/L (ref 0.4–2)
LACTATE BLDA-SCNC: 5.7 MMOL/L (ref 0.4–2)
LACTATE BLDV-SCNC: 5 MMOL/L (ref 0.4–2)
MAGNESIUM SERPL-MCNC: 2.6 MG/DL (ref 1.6–2.4)
MCH RBC QN AUTO: 30.4 PG (ref 26–34)
MCHC RBC AUTO-ENTMCNC: 31.7 G/DL (ref 32–36)
MCV RBC AUTO: 96 FL (ref 80–100)
NRBC BLD-RTO: 0 /100 WBCS (ref 0–0)
OXYHGB MFR BLDA: 90.5 % (ref 94–98)
OXYHGB MFR BLDA: 91.4 % (ref 94–98)
OXYHGB MFR BLDV: 59.9 % (ref 45–75)
P AXIS: 24 DEGREES
P OFFSET: 198 MS
P ONSET: 149 MS
PCO2 BLDA: 34 MM HG (ref 38–42)
PCO2 BLDA: 40 MM HG (ref 38–42)
PCO2 BLDV: 44 MM HG (ref 41–51)
PH BLDA: 7.32 PH (ref 7.38–7.42)
PH BLDA: 7.42 PH (ref 7.38–7.42)
PH BLDV: 7.3 PH (ref 7.33–7.43)
PHOSPHATE SERPL-MCNC: 3.2 MG/DL (ref 2.5–4.9)
PLATELET # BLD AUTO: 137 X10*3/UL (ref 150–450)
PO2 BLDA: 59 MM HG (ref 85–95)
PO2 BLDA: 64 MM HG (ref 85–95)
PO2 BLDV: 35 MM HG (ref 35–45)
POTASSIUM BLDA-SCNC: 4.3 MMOL/L (ref 3.5–5.3)
POTASSIUM BLDA-SCNC: 5.1 MMOL/L (ref 3.5–5.3)
POTASSIUM BLDV-SCNC: 4.6 MMOL/L (ref 3.5–5.3)
POTASSIUM SERPL-SCNC: 4.6 MMOL/L (ref 3.5–5.3)
PR INTERVAL: 128 MS
Q ONSET: 213 MS
QRS COUNT: 12 BEATS
QRS DURATION: 88 MS
QT INTERVAL: 414 MS
QTC CALCULATION(BAZETT): 447 MS
QTC FREDERICIA: 436 MS
R AXIS: -35 DEGREES
RBC # BLD AUTO: 3.26 X10*6/UL (ref 4.5–5.9)
SAO2 % BLDA: 94 % (ref 94–100)
SAO2 % BLDA: 94 % (ref 94–100)
SAO2 % BLDV: 62 % (ref 45–75)
SODIUM BLDA-SCNC: 136 MMOL/L (ref 136–145)
SODIUM BLDA-SCNC: 137 MMOL/L (ref 136–145)
SODIUM BLDV-SCNC: 135 MMOL/L (ref 136–145)
SODIUM SERPL-SCNC: 138 MMOL/L (ref 136–145)
T AXIS: 59 DEGREES
T OFFSET: 420 MS
VENTRICULAR RATE: 70 BPM
WBC # BLD AUTO: 13.8 X10*3/UL (ref 4.4–11.3)

## 2025-06-25 PROCEDURE — 97116 GAIT TRAINING THERAPY: CPT | Mod: GP

## 2025-06-25 PROCEDURE — 2500000001 HC RX 250 WO HCPCS SELF ADMINISTERED DRUGS (ALT 637 FOR MEDICARE OP): Performed by: NURSE PRACTITIONER

## 2025-06-25 PROCEDURE — 2500000001 HC RX 250 WO HCPCS SELF ADMINISTERED DRUGS (ALT 637 FOR MEDICARE OP)

## 2025-06-25 PROCEDURE — 2500000004 HC RX 250 GENERAL PHARMACY W/ HCPCS (ALT 636 FOR OP/ED): Performed by: PHYSICIAN ASSISTANT

## 2025-06-25 PROCEDURE — 83735 ASSAY OF MAGNESIUM: CPT

## 2025-06-25 PROCEDURE — 84132 ASSAY OF SERUM POTASSIUM: CPT

## 2025-06-25 PROCEDURE — 1200000002 HC GENERAL ROOM WITH TELEMETRY DAILY

## 2025-06-25 PROCEDURE — 71045 X-RAY EXAM CHEST 1 VIEW: CPT | Performed by: RADIOLOGY

## 2025-06-25 PROCEDURE — 84132 ASSAY OF SERUM POTASSIUM: CPT | Performed by: PHYSICIAN ASSISTANT

## 2025-06-25 PROCEDURE — 82810 BLOOD GASES O2 SAT ONLY: CPT

## 2025-06-25 PROCEDURE — 99291 CRITICAL CARE FIRST HOUR: CPT | Performed by: STUDENT IN AN ORGANIZED HEALTH CARE EDUCATION/TRAINING PROGRAM

## 2025-06-25 PROCEDURE — 2500000004 HC RX 250 GENERAL PHARMACY W/ HCPCS (ALT 636 FOR OP/ED)

## 2025-06-25 PROCEDURE — 82435 ASSAY OF BLOOD CHLORIDE: CPT | Performed by: PHYSICIAN ASSISTANT

## 2025-06-25 PROCEDURE — 85027 COMPLETE CBC AUTOMATED: CPT

## 2025-06-25 PROCEDURE — 99233 SBSQ HOSP IP/OBS HIGH 50: CPT | Performed by: STUDENT IN AN ORGANIZED HEALTH CARE EDUCATION/TRAINING PROGRAM

## 2025-06-25 PROCEDURE — 2500000004 HC RX 250 GENERAL PHARMACY W/ HCPCS (ALT 636 FOR OP/ED): Performed by: NURSE PRACTITIONER

## 2025-06-25 PROCEDURE — 82947 ASSAY GLUCOSE BLOOD QUANT: CPT

## 2025-06-25 PROCEDURE — 37799 UNLISTED PX VASCULAR SURGERY: CPT

## 2025-06-25 PROCEDURE — 2500000002 HC RX 250 W HCPCS SELF ADMINISTERED DRUGS (ALT 637 FOR MEDICARE OP, ALT 636 FOR OP/ED): Performed by: PHYSICIAN ASSISTANT

## 2025-06-25 PROCEDURE — 82330 ASSAY OF CALCIUM: CPT

## 2025-06-25 PROCEDURE — 97161 PT EVAL LOW COMPLEX 20 MIN: CPT | Mod: GP

## 2025-06-25 PROCEDURE — 71045 X-RAY EXAM CHEST 1 VIEW: CPT

## 2025-06-25 PROCEDURE — 2500000001 HC RX 250 WO HCPCS SELF ADMINISTERED DRUGS (ALT 637 FOR MEDICARE OP): Performed by: PHYSICIAN ASSISTANT

## 2025-06-25 RX ORDER — POTASSIUM CHLORIDE 29.8 MG/ML
40 INJECTION INTRAVENOUS EVERY 6 HOURS PRN
Status: DISCONTINUED | OUTPATIENT
Start: 2025-06-25 | End: 2025-06-25

## 2025-06-25 RX ORDER — PRAVASTATIN SODIUM 40 MG/1
80 TABLET ORAL NIGHTLY
Status: DISPENSED | OUTPATIENT
Start: 2025-06-25

## 2025-06-25 RX ORDER — INSULIN LISPRO 100 [IU]/ML
0-15 INJECTION, SOLUTION INTRAVENOUS; SUBCUTANEOUS
Status: DISCONTINUED | OUTPATIENT
Start: 2025-06-25 | End: 2025-06-25

## 2025-06-25 RX ORDER — CALCIUM GLUCONATE 20 MG/ML
2 INJECTION, SOLUTION INTRAVENOUS EVERY 6 HOURS PRN
Status: DISCONTINUED | OUTPATIENT
Start: 2025-06-25 | End: 2025-06-25

## 2025-06-25 RX ORDER — INDOMETHACIN 25 MG/1
50 CAPSULE ORAL ONCE
Status: COMPLETED | OUTPATIENT
Start: 2025-06-25 | End: 2025-06-25

## 2025-06-25 RX ORDER — METOPROLOL TARTRATE 25 MG/1
12.5 TABLET, FILM COATED ORAL 2 TIMES DAILY
Status: DISCONTINUED | OUTPATIENT
Start: 2025-06-25 | End: 2025-06-29

## 2025-06-25 RX ORDER — FUROSEMIDE 10 MG/ML
20 INJECTION INTRAMUSCULAR; INTRAVENOUS ONCE
Status: COMPLETED | OUTPATIENT
Start: 2025-06-25 | End: 2025-06-25

## 2025-06-25 RX ORDER — AMOXICILLIN 250 MG
2 CAPSULE ORAL 2 TIMES DAILY
Status: DISPENSED | OUTPATIENT
Start: 2025-06-25

## 2025-06-25 RX ORDER — INSULIN LISPRO 100 [IU]/ML
0-5 INJECTION, SOLUTION INTRAVENOUS; SUBCUTANEOUS
Status: DISCONTINUED | OUTPATIENT
Start: 2025-06-25 | End: 2025-06-28

## 2025-06-25 RX ORDER — POTASSIUM CHLORIDE 14.9 MG/ML
20 INJECTION INTRAVENOUS EVERY 6 HOURS PRN
Status: DISCONTINUED | OUTPATIENT
Start: 2025-06-25 | End: 2025-06-25

## 2025-06-25 RX ORDER — POTASSIUM CHLORIDE 1.5 G/1.58G
40 POWDER, FOR SOLUTION ORAL EVERY 6 HOURS PRN
Status: DISCONTINUED | OUTPATIENT
Start: 2025-06-25 | End: 2025-06-25

## 2025-06-25 RX ORDER — MAGNESIUM SULFATE HEPTAHYDRATE 40 MG/ML
2 INJECTION, SOLUTION INTRAVENOUS EVERY 6 HOURS PRN
Status: DISCONTINUED | OUTPATIENT
Start: 2025-06-25 | End: 2025-06-25

## 2025-06-25 RX ORDER — ONDANSETRON HYDROCHLORIDE 2 MG/ML
4 INJECTION, SOLUTION INTRAVENOUS ONCE
Status: COMPLETED | OUTPATIENT
Start: 2025-06-25 | End: 2025-06-25

## 2025-06-25 RX ORDER — HYDROMORPHONE HYDROCHLORIDE 0.2 MG/ML
0.2 INJECTION INTRAMUSCULAR; INTRAVENOUS; SUBCUTANEOUS
Status: DISCONTINUED | OUTPATIENT
Start: 2025-06-25 | End: 2025-06-25

## 2025-06-25 RX ORDER — POTASSIUM CHLORIDE 20 MEQ/1
40 TABLET, EXTENDED RELEASE ORAL EVERY 6 HOURS PRN
Status: DISCONTINUED | OUTPATIENT
Start: 2025-06-25 | End: 2025-06-25

## 2025-06-25 RX ORDER — HEPARIN SODIUM 5000 [USP'U]/ML
5000 INJECTION, SOLUTION INTRAVENOUS; SUBCUTANEOUS EVERY 8 HOURS
Status: DISPENSED | OUTPATIENT
Start: 2025-06-25

## 2025-06-25 RX ORDER — CALCIUM GLUCONATE 20 MG/ML
1 INJECTION, SOLUTION INTRAVENOUS EVERY 6 HOURS PRN
Status: DISCONTINUED | OUTPATIENT
Start: 2025-06-25 | End: 2025-06-25

## 2025-06-25 RX ORDER — ACETAMINOPHEN 325 MG/1
975 TABLET ORAL EVERY 6 HOURS
Status: DISPENSED | OUTPATIENT
Start: 2025-06-25

## 2025-06-25 RX ORDER — HYDROMORPHONE HYDROCHLORIDE 0.2 MG/ML
0.2 INJECTION INTRAMUSCULAR; INTRAVENOUS; SUBCUTANEOUS EVERY 2 HOUR PRN
Status: DISCONTINUED | OUTPATIENT
Start: 2025-06-25 | End: 2025-06-26

## 2025-06-25 RX ORDER — POTASSIUM CHLORIDE 1.5 G/1.58G
20 POWDER, FOR SOLUTION ORAL EVERY 6 HOURS PRN
Status: DISCONTINUED | OUTPATIENT
Start: 2025-06-25 | End: 2025-06-25

## 2025-06-25 RX ORDER — POTASSIUM CHLORIDE 20 MEQ/1
20 TABLET, EXTENDED RELEASE ORAL EVERY 6 HOURS PRN
Status: DISCONTINUED | OUTPATIENT
Start: 2025-06-25 | End: 2025-06-25

## 2025-06-25 RX ORDER — MAGNESIUM SULFATE HEPTAHYDRATE 40 MG/ML
4 INJECTION, SOLUTION INTRAVENOUS EVERY 6 HOURS PRN
Status: DISCONTINUED | OUTPATIENT
Start: 2025-06-25 | End: 2025-06-25

## 2025-06-25 RX ADMIN — ASPIRIN 81 MG: 81 TABLET, CHEWABLE ORAL at 09:30

## 2025-06-25 RX ADMIN — CEFAZOLIN SODIUM 2 G: 2 INJECTION, SOLUTION INTRAVENOUS at 13:02

## 2025-06-25 RX ADMIN — CEFAZOLIN SODIUM 2 G: 2 INJECTION, SOLUTION INTRAVENOUS at 05:05

## 2025-06-25 RX ADMIN — ONDANSETRON 4 MG: 2 INJECTION INTRAMUSCULAR; INTRAVENOUS at 05:20

## 2025-06-25 RX ADMIN — OXYCODONE 5 MG: 5 TABLET ORAL at 04:03

## 2025-06-25 RX ADMIN — SENNOSIDES AND DOCUSATE SODIUM 2 TABLET: 50; 8.6 TABLET ORAL at 09:30

## 2025-06-25 RX ADMIN — ACETAMINOPHEN 650 MG: 325 TABLET ORAL at 04:02

## 2025-06-25 RX ADMIN — FUROSEMIDE 20 MG: 10 INJECTION, SOLUTION INTRAVENOUS at 13:02

## 2025-06-25 RX ADMIN — PRAVASTATIN SODIUM 80 MG: 40 TABLET ORAL at 20:47

## 2025-06-25 RX ADMIN — HEPARIN SODIUM 5000 UNITS: 5000 INJECTION, SOLUTION INTRAVENOUS; SUBCUTANEOUS at 06:37

## 2025-06-25 RX ADMIN — CALCIUM GLUCONATE 1 G: 20 INJECTION, SOLUTION INTRAVENOUS at 02:06

## 2025-06-25 RX ADMIN — HYDROMORPHONE HYDROCHLORIDE 0.2 MG: 0.2 INJECTION, SOLUTION INTRAMUSCULAR; INTRAVENOUS; SUBCUTANEOUS at 02:06

## 2025-06-25 RX ADMIN — HEPARIN SODIUM 5000 UNITS: 5000 INJECTION, SOLUTION INTRAVENOUS; SUBCUTANEOUS at 15:54

## 2025-06-25 RX ADMIN — HYDROMORPHONE HYDROCHLORIDE 0.2 MG: 0.2 INJECTION, SOLUTION INTRAMUSCULAR; INTRAVENOUS; SUBCUTANEOUS at 08:52

## 2025-06-25 RX ADMIN — OXYCODONE 5 MG: 5 TABLET ORAL at 16:06

## 2025-06-25 RX ADMIN — METOPROLOL TARTRATE 12.5 MG: 25 TABLET, FILM COATED ORAL at 20:47

## 2025-06-25 RX ADMIN — INSULIN LISPRO 5 UNITS: 100 INJECTION, SOLUTION INTRAVENOUS; SUBCUTANEOUS at 00:42

## 2025-06-25 RX ADMIN — OXYCODONE 5 MG: 5 TABLET ORAL at 20:46

## 2025-06-25 RX ADMIN — CEFAZOLIN SODIUM 2 G: 2 INJECTION, SOLUTION INTRAVENOUS at 20:58

## 2025-06-25 RX ADMIN — SENNOSIDES AND DOCUSATE SODIUM 2 TABLET: 50; 8.6 TABLET ORAL at 20:47

## 2025-06-25 RX ADMIN — SODIUM BICARBONATE 50 MEQ: 84 INJECTION, SOLUTION INTRAVENOUS at 01:13

## 2025-06-25 RX ADMIN — METOPROLOL TARTRATE 12.5 MG: 25 TABLET, FILM COATED ORAL at 09:30

## 2025-06-25 ASSESSMENT — COGNITIVE AND FUNCTIONAL STATUS - GENERAL
DAILY ACTIVITIY SCORE: 21
MOBILITY SCORE: 18
CLIMB 3 TO 5 STEPS WITH RAILING: A LOT
DRESSING REGULAR UPPER BODY CLOTHING: A LITTLE
TURNING FROM BACK TO SIDE WHILE IN FLAT BAD: A LITTLE
HELP NEEDED FOR BATHING: A LITTLE
TURNING FROM BACK TO SIDE WHILE IN FLAT BAD: A LITTLE
STANDING UP FROM CHAIR USING ARMS: A LITTLE
MOBILITY SCORE: 17
MOVING FROM LYING ON BACK TO SITTING ON SIDE OF FLAT BED WITH BEDRAILS: A LITTLE
STANDING UP FROM CHAIR USING ARMS: A LITTLE
WALKING IN HOSPITAL ROOM: A LITTLE
WALKING IN HOSPITAL ROOM: A LITTLE
MOVING TO AND FROM BED TO CHAIR: A LITTLE
MOVING TO AND FROM BED TO CHAIR: A LITTLE
CLIMB 3 TO 5 STEPS WITH RAILING: A LITTLE
MOVING FROM LYING ON BACK TO SITTING ON SIDE OF FLAT BED WITH BEDRAILS: A LITTLE
TOILETING: A LITTLE

## 2025-06-25 ASSESSMENT — ACTIVITIES OF DAILY LIVING (ADL)
LACK_OF_TRANSPORTATION: NO
ADL_ASSISTANCE: INDEPENDENT

## 2025-06-25 ASSESSMENT — PAIN SCALES - GENERAL
PAINLEVEL_OUTOF10: 1
PAINLEVEL_OUTOF10: 4
PAINLEVEL_OUTOF10: 5 - MODERATE PAIN
PAINLEVEL_OUTOF10: 0 - NO PAIN
PAINLEVEL_OUTOF10: 6
PAINLEVEL_OUTOF10: 1
PAINLEVEL_OUTOF10: 2
PAINLEVEL_OUTOF10: 5 - MODERATE PAIN
PAINLEVEL_OUTOF10: 2
PAINLEVEL_OUTOF10: 4

## 2025-06-25 ASSESSMENT — PAIN DESCRIPTION - DESCRIPTORS
DESCRIPTORS: SORE
DESCRIPTORS: DISCOMFORT;DULL
DESCRIPTORS: DISCOMFORT
DESCRIPTORS: ACHING;SORE
DESCRIPTORS: DISCOMFORT

## 2025-06-25 ASSESSMENT — PAIN DESCRIPTION - ORIENTATION: ORIENTATION: MID

## 2025-06-25 ASSESSMENT — PAIN DESCRIPTION - LOCATION
LOCATION: BACK
LOCATION: STERNUM

## 2025-06-25 NOTE — PROGRESS NOTES
"CTICU Progress Note  Yosvany Chase/[id number]    Admit Date: 6/24/2025  Hospital Length of Stay: 1   ICU Length of Stay: 23h   CT SURGEON: Dr. Herr    SUBJECTIVE:   Did well o/n    MEDICATIONS  Infusions:       Scheduled:  acetaminophen, 975 mg, q6h  aspirin, 81 mg, Daily  ceFAZolin, 2 g, q8h  heparin, 5,000 Units, q8h  insulin lispro, 0-5 Units, TID AC  lidocaine, 1 patch, q24h  metoprolol tartrate, 12.5 mg, BID  mupirocin, , BID  oxygen, , Continuous - Inhalation  polyethylene glycol, 17 g, Daily  pravastatin, 80 mg, Nightly  sennosides-docusate sodium, 2 tablet, BID      PRN:  alteplase, 2 mg, PRN  calcium gluconate, 1 g, q6h PRN  calcium gluconate, 2 g, q6h PRN  dextrose, 25 g, q15 min PRN   Or  glucagon, 1 mg, q15 min PRN  HYDROmorphone, 0.2 mg, q2h PRN  magnesium sulfate, 2 g, q6h PRN  magnesium sulfate, 2 g, q6h PRN  magnesium sulfate, 4 g, q6h PRN  magnesium sulfate, 4 g, q6h PRN  naloxone, 0.2 mg, q5 min PRN  oxyCODONE, 10 mg, q4h PRN  oxyCODONE, 5 mg, q4h PRN  potassium chloride CR, 20 mEq, q6h PRN   Or  potassium chloride, 20 mEq, q6h PRN  potassium chloride CR, 20 mEq, q6h PRN   Or  potassium chloride, 20 mEq, q6h PRN  potassium chloride CR, 40 mEq, q6h PRN   Or  potassium chloride, 40 mEq, q6h PRN  potassium chloride CR, 40 mEq, q6h PRN   Or  potassium chloride, 40 mEq, q6h PRN  potassium chloride, 20 mEq, q6h PRN  potassium chloride, 20 mEq, q6h PRN  potassium chloride, 40 mEq, q6h PRN  potassium chloride, 40 mEq, q6h PRN        PHYSICAL EXAM:   Visit Vitals  /71   Pulse 74   Temp 36.8 °C (98.2 °F) (Temporal)   Resp 14   Ht 1.651 m (5' 5\")   Wt 91.4 kg (201 lb 8 oz)   SpO2 94%   BMI 33.53 kg/m²   Smoking Status Never   BSA 2.05 m²     Wt Readings from Last 5 Encounters:   06/24/25 91.4 kg (201 lb 8 oz)   06/20/25 92 kg (202 lb 13.2 oz)   06/02/25 95.6 kg (210 lb 12.8 oz)   05/23/25 96.1 kg (211 lb 13.8 oz)   04/23/25 96.1 kg (211 lb 13.8 oz)     INTAKE/OUTPUT:  I/O last 3 completed shifts:  In: " 5202.5 (56.9 mL/kg) [P.O.:60; I.V.:200.5 (2.2 mL/kg); Blood:1532; IV Piggyback:3410]  Out: 2140 (23.4 mL/kg) [Urine:1070 (0.3 mL/kg/hr); Blood:250; Chest Tube:820]  Weight: 91.4 kg        LDA:  Chest Tube 1 Pleural 28 Fr (Active)   Placement Date/Time: 06/24/25 1245   Placed by: DERRICK MCDONOUGH  Hand Hygiene Completed: Yes  Tube Number: 1  Chest Tube Location: Pleural  Chest Tube Drain Tube Size (Fr): 28 Fr   Number of days: 0       Y Chest Tube 1 and 2 1 Mediastinal 28 Fr. 2 Mediastinal 28 Fr. (Active)   Placement Date/Time: 06/24/25 1245   Placed by: DERRICK MCDONOUGH  Hand Hygiene Completed: Yes  Tube Number 1: 1  Chest Tube Location 1: Mediastinal  Size (Fr.) 1: 28 Fr.  Tube Number 2: 2  Chest Tube Location 2: Mediastinal  Size (Fr.) 2: 28 Fr.  Chest Tub...   Number of days: 0         Vent settings:  Vent Mode: Pressure support  FiO2 (%):  [40 %-50 %] 40 %  S RR:  [16] 16  S VT:  [490 mL] 490 mL  PEEP/CPAP (cm H2O):  [0 cm H20-8 cm H20] 0 cm H20  DC SUP:  [0 cm H20-5 cm H20] 0 cm H20  MAP (cm H2O):  [12] 12    Physical Exam:   PHYSICAL EXAM:  - GENERAL: No acute distress. Well-nourished.  - NEUROLOGIC: No focal neurological deficits. Cam negative. AOx3  - LUNGS: Diminished bases. No accessory muscle use. 2L NC.  Left pleural and med x2, serosanguinous drainage  - CARDIOVASCULAR: SR 70-80s.   Epicardial wires, VVI@50.  - ABDOMEN: Soft, non-tender and non-distended.   - : Voiding via Am[place]  - EXTREMITIES: No edema. Non-tender.?  - SKIN: No rashes or lesions. Warm.  - PSYCHIATRIC: Calm and cooperative.       Images: CXR with low lung volumes and basilar atelectasis    Invasive Hemodynamics:    Most Recent Range Past 24hrs   BP (Art) 121/55 Arterial Line BP 1  Min: 75/41  Max: 136/61   MAP(Art) 78 mmHg Arterial Line MAP 1 (mmHg)   Min: 50 mmHg  Max: 88 mmHg     Daily Risk Screen:  Line unnecessary, will be removed today  critically ill patient who need accurate urinary output measurements - Remove      Assessment/Plan    Yosvany Chase is a 73 y/o male with PMH of hyperlipidemia, GERD, lumbar spine stenosis, phlebitis of leg, dermatitis, left subclavian stenosis and elevated CAC score (1408 2/5/2025) followed by + Nuclear stress test,  [facility] performed 5/23/25 with LAD, LCX and RCA disease. He is now s/p CABG x3 (LIMA-LAD, SVG-PDA to OM) and LAAC with Dr Herr on 6/24.     Plan:  NEURO:  tylenol oxy dilaudid lido patches. Pain reasonably managed per patient.      CV:  ASA statin metop     PULM:  saturating well post extubation, left pleural CT to be removed      GI:  regular diet and bowel regimen     :  no issues, remove gerard      ENDO: SSI      HEME:  Acute blood loss anemia, stable. Asa subcutaneous heparin SCDs     ID:  reactive leukocytosis and mupirocin for MSSA      Proph:  SCDs  SUBQ     G:  Line  Right IJ MAC w Minimac placed 6/24 -> Remove  Right brachial a-line placed 6/24 -> Remove  Gerard 6/24 -> Remove     F: Family: Updated     Code status: Full Code    I personally spent 30 minutes of critical care time directly and personally managing the patient exclusive of separately billable procedures.     A,B,C,D,E,F,G: reviewed     Dispo: Transfer to [facility]    Regan Casanova MD   [id number]

## 2025-06-25 NOTE — PROGRESS NOTES
Physical Therapy    Physical Therapy Evaluation & Treatment    Patient Name: Yosvany Chase  MRN: [id number]  [place] [facility]  Room: 10/10-A  Today's Date: 6/25/2025   Time Calculation  Start Time: 1133  Stop Time: 1211  Time Calculation (min): 38 min    Assessment/Plan   PT Assessment  PT Assessment Results: Decreased endurance, Impaired balance, Decreased mobility, Pain  Rehab Prognosis: Excellent  Barriers to Discharge Home: No anticipated barriers  Evaluation/Treatment Tolerance: Patient tolerated treatment well  Medical Staff Made Aware: Yes  End of Session Communication: Bedside nurse  Assessment Comment: Pt performed functional transfers and ambulated >300' with CGA this date; pt will continue to benefit from skilled PT to improve functional mobility  End of Session Patient Position: Up in chair, Alarm off, not on at start of session   IP OR SWING BED PT PLAN  Inpatient or Swing Bed: Inpatient  PT Plan  Treatment/Interventions: Bed mobility, Transfer training, Gait training, Balance training, Stair training, Strengthening, Endurance training, Range of motion, Therapeutic exercise, Therapeutic activity, Home exercise program, Positioning  PT Plan: Ongoing PT  PT Frequency: 3 times per week  PT Discharge Recommendations: Low intensity level of continued care  PT Recommended Transfer Status: Assist x1  PT - OK to Discharge: Yes      Subjective     PT Visit Info:  PT Received On: 06/25/25  General Visit Information:  General  Reason for Referral: status post CABGx3, JAZMIN  Past Medical History Relevant to Rehab: history of HLD and CAD  Prior to Session Communication: Bedside nurse  Patient Position Received: Up in chair, Alarm off, not on at start of session  Preferred Learning Style: auditory, visual, verbal  General Comment: Pt awake and willing to participate in PT evaluation. 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; does office work)  Prior Function Comments: Does not drive  Precautions:  Precautions  Hearing/Visual Limitations: WFL  Medical Precautions: Cardiac precautions, Fall precautions, Chest tube  Post-Surgical Precautions: Move in the Tube  Precautions Comment: MAP>65     Date/Time Vitals Session Patient Position Pulse Resp SpO2 BP MAP (mmHg)    06/25/25 1100 --  --  70  19  96 %  123/77  90     06/25/25 1133 Pre PT  Sitting  78  --  95 %  123/77  90     06/25/25 1200 --  --  79  23  100 %  136/70  86     06/25/25 1211 Post PT  Sitting  80  --  95 %  136/70  86           Vital Signs Comment: Vitals stable during session    Objective   Pain:  Pain Assessment  Pain Assessment: 0-10  0-10 (Numeric) Pain Score: 1  Pain Type: Surgical pain  Pain Location: Chest  Pain Interventions: Repositioned  Response to Interventions: Resting quietly  Cognition:  Cognition  Overall Cognitive Status: Within Functional Limits  Orientation Level: Oriented X4    General Assessments:    Activity Tolerance  Endurance: Tolerates 30 min exercise with multiple rests  Early Mobility/Exercise Safety Screen: Proceed with mobilization - No exclusion criteria met  Activity Tolerance Comments: Vitals stable during session    Sensation  Light Touch: No apparent deficits    Strength  Strength Comments: Grossly at least 3/5 based on functional mobility  Strength  Strength Comments: Grossly at least 3/5 based on functional  mobility    Perception  Inattention/Neglect: Appears intact  Initiation: Appears intact  Motor Planning: Appears intact  Perseveration: Not present      Coordination  Movements are Fluid and Coordinated: Yes    Postural Control  Postural Control: Within Functional Limits    Static Sitting Balance  Static Sitting-Balance Support: Feet supported, Bilateral upper extremity supported  Static Sitting-Level of Assistance: Close supervision    Static Standing Balance  Static Standing-Balance Support: No upper extremity supported  Static Standing-Level of Assistance: Close supervision  Functional Assessments:  Bed Mobility  Bed Mobility: No    Transfers  Transfer: Yes  Transfer 1  Transfer From 1: Sit to, Stand to  Transfer to 1: Stand, Sit  Technique 1: Sit to stand, Stand to sit  Transfer Device 1: Walker  Transfer Level of Assistance 1: Contact guard  Trials/Comments 1: CGA for safety and line management    Ambulation/Gait Training  Ambulation/Gait Training Performed: refer to treatment section below    Extremity/Trunk Assessments:  Cervical Spine   Cervical Spine: Within Functional Limits  Lumbar Spine   Lumbar Spine : Within Functional Limits    RLE   RLE : Within Functional Limits  LLE   LLE : Within Functional Limits    Treatments:  Therapeutic Activity  Therapeutic Activity Performed: Yes  Therapeutic Activity 1: Increased time for line management required for functional mobility  Therapeutic Activity 2: Pt stood for ~2 minutes with SBA for safety while urinating in bathroom  Therapeutic Activity 3: Pt took 2 brief standing rest breaks during bout of ambulation with CGA for safety    Ambulation/Gait Training  Ambulation/Gait Training Performed: Yes  Ambulation/Gait Training 1  Surface 1: Level tile  Device 1: Thoracic walker  Assistance 1: Contact guard  Quality of Gait 1: Decreased step length, Forward flexed posture  Comments/Distance (ft) 1: x300' with CGA for safety and line management  Ambulation/Gait Training  2  Surface 2: Level tile  Device 2: Thoracic walker  Assistance 2: Contact guard  Quality of Gait 2: Forward flexed posture  Comments/Distance (ft) 2: 2x10' to/from bathroom; CGA for safety and line management    Outcome Measures:  [facility] Basic Mobility  Turning from your back to your side while in a flat bed without using bedrails: A little  Moving from lying on your back to sitting on the side of a flat bed without using bedrails: A little  Moving to and from bed to chair (including a wheelchair): A little  Standing up from a chair using your arms (e.g. wheelchair or bedside chair): A little  To walk in hospital room: A little  Climbing 3-5 steps with railing: A little  Basic Mobility - Total Score: 18    FSS-ICU  Ambulation: Walks >/ or equal to 150 feet with supervision  Rolling: Supervision or set-up only  Sitting: Supervision or set-up only  Transfer Sit-to-Stand: Minimal assistance (performs 75% or more of task)  Transfer Supine-to-Sit: Supervision or set-up only  Total Score: 24      Early Mobility/Exercise Safety Screen: Proceed with mobilization - No exclusion criteria met  ICU Mobility Scale: Walking with assistance of 1 person [8]    Encounter Problems       Encounter Problems (Active)       Balance       Pt will demonstrate improved sitting/standing static/dynamic balance activities without LOB via score of 24/28 on the [place] for increase in safety prior to DC.  (Progressing)       Start:  06/25/25    Expected End:  07/09/25               Mobility       Pt will tolerate >15 minutes of upright standing activity without seated rest breaks with no changes in vital signs for improved functional mobility.  (Progressing)       Start:  06/25/25    Expected End:  07/09/25            Pt will ambulate >500ft with appropriate form, SBA or less, LRAD, and no LOB for safe DC.  (Progressing)       Start:  06/25/25    Expected End:  07/09/25            Pt will ambulate up/down >3 stairs with hand rail with CGA or  less to safely access their home upon DC.   (Progressing)       Start:  06/25/25    Expected End:  07/09/25               PT Transfers       Pt will perform bed mobility and sit<>stand transfers independently to safely DC.  (Progressing)       Start:  06/25/25    Expected End:  07/09/25                   Education Documentation  Precautions, taught by Da Cortes PT at 6/25/2025 12:34 PM.  Learner: Patient  Readiness: Acceptance  Method: Explanation  Response: Verbalizes Understanding  Comment: agreeable to PT POC    Body Mechanics, taught by Da Cortes PT at 6/25/2025 12:34 PM.  Learner: Patient  Readiness: Acceptance  Method: Explanation  Response: Verbalizes Understanding  Comment: agreeable to PT POC    Mobility Training, taught by Da Cortes PT at 6/25/2025 12:34 PM.  Learner: Patient  Readiness: Acceptance  Method: Explanation  Response: Verbalizes Understanding  Comment: agreeable to PT POC    Education Comments  No comments found.    DA CORTES PT

## 2025-06-25 NOTE — PROGRESS NOTES
06/25/25 1245   Discharge Planning   Living Arrangements Spouse/significant other   Assistance Needed IPTA / no ADs / semi-retired   Type of Residence Private residence   Number of Stairs to Enter Residence 3   Home or Post Acute Services In home services   Type of Home Care Services Home nursing visits;Home OT;Home PT   Expected Discharge Disposition Home H  ([facility])   Financial Resource Strain   How hard is it for you to pay for the very basics like food, housing, medical care, and heating? Not hard   Housing Stability   In the last 12 months, was there a time when you were not able to pay the mortgage or rent on time? N   In the past 12 months, how many times have you moved where you were living? 0   At any time in the past 12 months, were you homeless or living in a shelter (including now)? N   Transportation Needs   In the past 12 months, has lack of transportation kept you from medical appointments or from getting medications? no   In the past 12 months, has lack of transportation kept you from meetings, work, or from getting things needed for daily living? No   Patient Choice   Provider Choice list and CMS website (https://medicare.gov/care-compare#search) for post-acute Quality and Resource Measure Data were provided and reviewed with: Patient   Patient / Family choosing to utilize agency / facility established prior to hospitalization No   Stroke Family Assessment   Stroke Family Assessment Needed No   Intensity of Service   Intensity of Service 0-30 min     CT SURGERY  06/24 CABG x3 (LIMA-LAD, SVG-PDA to OM) and LAAC with Dr Herr    DC PLAN  [facility]     PAYOR   Mg Medicare Advantage    PT/OT   06/25 PT = low (18)     COMPLETED  (X) 06/25 - DC/SDOH assessments with patient (2 sons present)                   - Verified EPIC address / PCP / Pharmacy                   - Reviewed [facility] loc as a service, right to choice and list to choose.     Alyse Ching (LSW, MSW)

## 2025-06-25 NOTE — PROGRESS NOTES
"CTICU Progress Note  Yosvany Chase/[id number]    Admit Date: 6/24/2025  Hospital Length of Stay: 1   ICU Length of Stay: 17h   CT SURGEON: Dr. Herr    SUBJECTIVE:   No acute events overnight.  Extubated immediately post operatively.     MEDICATIONS  Infusions:  norepinephrine, Last Rate: Stopped (06/24/25 2105)      Scheduled:  acetaminophen, 975 mg, q6h  aspirin, 81 mg, Daily  atorvastatin, 80 mg, Nightly  ceFAZolin, 2 g, q8h  heparin, 5,000 Units, q8h  insulin lispro, 0-15 Units, TID AC  lidocaine, 1 patch, q24h  metoprolol tartrate, 12.5 mg, BID  mupirocin, , BID  oxygen, , Continuous - Inhalation  polyethylene glycol, 17 g, Daily      PRN:  alteplase, 2 mg, PRN  calcium gluconate, 1 g, q6h PRN  calcium gluconate, 2 g, q6h PRN  dextrose, 25 g, q15 min PRN   Or  glucagon, 1 mg, q15 min PRN  HYDROmorphone, 0.2 mg, q15 min PRN  magnesium sulfate, 2 g, q6h PRN  magnesium sulfate, 2 g, q6h PRN  magnesium sulfate, 4 g, q6h PRN  magnesium sulfate, 4 g, q6h PRN  naloxone, 0.2 mg, q5 min PRN  oxyCODONE, 10 mg, q4h PRN  oxyCODONE, 5 mg, q4h PRN  potassium chloride CR, 20 mEq, q6h PRN   Or  potassium chloride, 20 mEq, q6h PRN  potassium chloride CR, 20 mEq, q6h PRN   Or  potassium chloride, 20 mEq, q6h PRN  potassium chloride CR, 40 mEq, q6h PRN   Or  potassium chloride, 40 mEq, q6h PRN  potassium chloride CR, 40 mEq, q6h PRN   Or  potassium chloride, 40 mEq, q6h PRN  potassium chloride, 20 mEq, q6h PRN  potassium chloride, 20 mEq, q6h PRN  potassium chloride, 40 mEq, q6h PRN  potassium chloride, 40 mEq, q6h PRN        PHYSICAL EXAM:   Visit Vitals  /72 (BP Location: Left arm, Patient Position: Sitting)   Pulse 83   Temp 36.8 °C (98.2 °F) (Temporal)   Resp 21   Ht 1.651 m (5' 5\")   Wt 91.4 kg (201 lb 8 oz)   SpO2 97%   BMI 33.53 kg/m²   Smoking Status Never   BSA 2.05 m²     Wt Readings from Last 5 Encounters:   06/24/25 91.4 kg (201 lb 8 oz)   06/20/25 92 kg (202 lb 13.2 oz)   06/02/25 95.6 kg (210 lb 12.8 oz) "   05/23/25 96.1 kg (211 lb 13.8 oz)   04/23/25 96.1 kg (211 lb 13.8 oz)     INTAKE/OUTPUT:  I/O last 3 completed shifts:  In: 5202.5 (56.9 mL/kg) [P.O.:60; I.V.:200.5 (2.2 mL/kg); Blood:1532; IV Piggyback:3410]  Out: 2140 (23.4 mL/kg) [Urine:1070 (0.3 mL/kg/hr); Blood:250; Chest Tube:820]  Weight: 91.4 kg        LDA:  Chest Tube 1 Pleural 28 Fr (Active)   Placement Date/Time: 06/24/25 1245   Placed by: DERRICK MCDONOUGH  Hand Hygiene Completed: Yes  Tube Number: 1  Chest Tube Location: Pleural  Chest Tube Drain Tube Size (Fr): 28 Fr   Number of days: 0       Y Chest Tube 1 and 2 1 Mediastinal 28 Fr. 2 Mediastinal 28 Fr. (Active)   Placement Date/Time: 06/24/25 1245   Placed by: DERRICK MCDONOUGH  Hand Hygiene Completed: Yes  Tube Number 1: 1  Chest Tube Location 1: Mediastinal  Size (Fr.) 1: 28 Fr.  Tube Number 2: 2  Chest Tube Location 2: Mediastinal  Size (Fr.) 2: 28 Fr.  Chest Tub...   Number of days: 0         Vent settings:  Vent Mode: Pressure support  FiO2 (%):  [40 %-50 %] 40 %  S RR:  [16] 16  S VT:  [490 mL] 490 mL  PEEP/CPAP (cm H2O):  [0 cm H20-8 cm H20] 0 cm H20  ID SUP:  [0 cm H20-5 cm H20] 0 cm H20  MAP (cm H2O):  [12] 12    Physical Exam:   PHYSICAL EXAM:  - GENERAL: No acute distress. Well-nourished.  - NEUROLOGIC: No focal neurological deficits. Cam negative. AOx3  - LUNGS: Diminished bases. No accessory muscle use. 2L NC.  Left pleural and med x2, serosanguinous drainage  - CARDIOVASCULAR: SR 70-80s.   Epicardial wires, VVI@50.  - ABDOMEN: Soft, non-tender and non-distended.   - : Voiding via [place]  - EXTREMITIES: No edema. Non-tender.?  - SKIN: No rashes or lesions. Warm.  - PSYCHIATRIC: Calm and cooperative.       Images: CXR with low lung volumes and basilar atelectasis    Invasive Hemodynamics:    Most Recent Range Past 24hrs   BP (Art) 121/55 Arterial Line BP 1  Min: 75/41  Max: 136/61   MAP(Art) 78 mmHg Arterial Line MAP 1 (mmHg)   Min: 50 mmHg  Max: 88 mmHg     Daily Risk Screen:  Line unnecessary,  will be removed today  critically ill patient who need accurate urinary output measurements - Remove      Assessment/Plan   Yosvany Chase is a 75 y/o male with PMH of hyperlipidemia, GERD, lumbar spine stenosis, phlebitis of leg, dermatitis, left subclavian stenosis and elevated CAC score (1408 2/5/2025) followed by + Nuclear stress test,  [facility] performed 5/23/25 with LAD, LCX and RCA disease. He is now s/p CABG x3 (LIMA-LAD, SVG-PDA to OM) and LAAC with Dr Herr on 6/24.     Plan:  NEURO:  Acute post operative pain, well controlled.  No focal deficits. Aox3.  Cam negative.  Ambulating well.  -->  - Serial neuro and pain assessments   - Scheduled Tylenol   - PRN oxycodone, dilaudid for pain   - PT Consult, OOB to chair as tolerated, chair position if not tolerated   - CAM ICU score qshift  - Sleep/wake cycle hygiene     CV:  History of HLD and CAD. Now s/p CABGx3 (LIMA-LAD, SVG-PDA to OM) and LAAC with Dr Herr on 6/24. Pre/Post EF: Normal. Vasopressors and inotropes weaned off overnight. A/V epicardial wires set VVI 50; SR 70s. -->  - Maintain epicardial wires set VVI 50   - Start aspirin daily  - Start pravastatin 80 mg; Follow up with PCP   - Start metoprolol 12.5 mg BID  - Hold home Imdur  - See  for diuresis      PULM:  No history of pulmonary disease. On 2L NC, IS ~ 750 ml. Chest tubes 2 med and 1 left pleural, no air leaks. .-->  - Daily CXR  - Wean FiO2 maintaining SpO2 >92%.   - IS q1h and OOB to chair  - Chest tubes to wall suction  - Remove left pleural chest tube     GI:  PMH of GERD not on home meds. Passed swallow evaluation. -->  - Regular Diet/ Robin BID  - Colace/senna BID and miralax BID     :  CSA-AVTAR Risk Score low.  No history of renal disease, baseline creatinine 0.87. Creatinine stable post-op. Gerard in place and making adequate UOP. Last 24 hours +3L. -->  - D/C gerard catheter  - Goal even to slightly negative  - RFP daily  - Replete electrolytes per CTICU protocol     ENDO: A1c: 5.2. -->  -  Maintain BG <180, insulin per CTICU protocol  - SSI #1 AC     HEME:  Acute blood loss anemia, stable. -->    - Monitor drain output volume and characteristics  - CBC daily  - Start aspirin daily  - start SQH   - SCDs for DVT prophylaxis.  - Last type and screen: 6/24     ID:  Leukocytosis likely reactive. MSSA:Positive. -->  - Trend temp q4h  - Periop cefazolin x 48hrs  - Mupirocin given MSSA colonization     Skin:  No active skin issues.  - preventative Mepilex dressings in place on sacrum and heels  - change preventative Mepilex weekly or more frequently as indicated (when moist/soiled)   - every shift skin assessment per nursing and weekly ICU skin rounds  - moisture barrier to be applied with melina care  - active skin problems addressed with nursing on daily rounds     Proph:  SCDs  SUBQ     G:  Line  Right IJ MAC w Minimac placed 6/24 -> Remove  Right brachial a-line placed 6/24 -> Remove  Amador 6/24 -> Remove     F: Family: Updated at bedside this morning         Code status: Full Code      I personally spent 40 minutes of critical care time directly and personally managing the patient exclusive of separately billable procedures.     A,B,C,D,E,F,G: reviewed     Dispo: CTICU care for now.    CTICU TEAM PHONE [phone]

## 2025-06-25 NOTE — CARE PLAN
Problem: Pain - Adult  Goal: Verbalizes/displays adequate comfort level or baseline comfort level  Outcome: Progressing     Problem: Safety - Adult  Goal: Free from fall injury  Outcome: Progressing     Problem: Skin  Goal: Participates in plan/prevention/treatment measures  Outcome: Progressing  Flowsheets (Taken 6/25/2025 0653)  Participates in plan/prevention/treatment measures:   Discuss with provider PT/OT consult   Increase activity/out of bed for meals  Sacrum clean/dry/intact, no redness  Goal: Prevent/manage excess moisture  Outcome: Progressing  Goal: Prevent/minimize sheer/friction injuries  Outcome: Progressing  Flowsheets (Taken 6/25/2025 0653)  Prevent/minimize sheer/friction injuries:   Complete micro-shifts as needed if patient unable. Adjust patient position to relieve pressure points, not a full turn   HOB 30 degrees or less   Increase activity/out of bed for meals   Turn/reposition every 2 hours/use positioning/transfer devices   Use pull sheet  Goal: Promote skin healing  Outcome: Progressing  Flowsheets (Taken 6/25/2025 0653)  Promote skin healing:   Protective dressings over bony prominences   Turn/reposition every 2 hours/use positioning/transfer devices     Problem: Fall/Injury  Goal: Not fall by end of shift  Outcome: Progressing  Goal: Be free from injury by end of the shift  Outcome: Progressing

## 2025-06-26 ENCOUNTER — APPOINTMENT (OUTPATIENT)
Dept: RADIOLOGY | Facility: HOSPITAL | Age: 75
DRG: 303 | End: 2025-06-26
Payer: MEDICARE

## 2025-06-26 LAB
ALBUMIN SERPL BCP-MCNC: 4 G/DL (ref 3.4–5)
ANION GAP SERPL CALC-SCNC: 12 MMOL/L (ref 10–20)
BUN SERPL-MCNC: 21 MG/DL (ref 6–23)
CALCIUM SERPL-MCNC: 8.8 MG/DL (ref 8.6–10.6)
CHLORIDE SERPL-SCNC: 100 MMOL/L (ref 98–107)
CO2 SERPL-SCNC: 26 MMOL/L (ref 21–32)
CREAT SERPL-MCNC: 0.71 MG/DL (ref 0.5–1.3)
EGFRCR SERPLBLD CKD-EPI 2021: >90 ML/MIN/1.73M*2
ERYTHROCYTE [DISTWIDTH] IN BLOOD BY AUTOMATED COUNT: 13.6 % (ref 11.5–14.5)
GLUCOSE BLD MANUAL STRIP-MCNC: 132 MG/DL (ref 74–99)
GLUCOSE BLD MANUAL STRIP-MCNC: 135 MG/DL (ref 74–99)
GLUCOSE SERPL-MCNC: 131 MG/DL (ref 74–99)
HCT VFR BLD AUTO: 30.2 % (ref 41–52)
HGB BLD-MCNC: 10.1 G/DL (ref 13.5–17.5)
MAGNESIUM SERPL-MCNC: 2.27 MG/DL (ref 1.6–2.4)
MCH RBC QN AUTO: 31.8 PG (ref 26–34)
MCHC RBC AUTO-ENTMCNC: 33.4 G/DL (ref 32–36)
MCV RBC AUTO: 95 FL (ref 80–100)
NRBC BLD-RTO: 0 /100 WBCS (ref 0–0)
PHOSPHATE SERPL-MCNC: 2.1 MG/DL (ref 2.5–4.9)
PLATELET # BLD AUTO: 137 X10*3/UL (ref 150–450)
POTASSIUM SERPL-SCNC: 4.1 MMOL/L (ref 3.5–5.3)
RBC # BLD AUTO: 3.18 X10*6/UL (ref 4.5–5.9)
SODIUM SERPL-SCNC: 134 MMOL/L (ref 136–145)
WBC # BLD AUTO: 16.4 X10*3/UL (ref 4.4–11.3)

## 2025-06-26 PROCEDURE — 71045 X-RAY EXAM CHEST 1 VIEW: CPT

## 2025-06-26 PROCEDURE — 2500000001 HC RX 250 WO HCPCS SELF ADMINISTERED DRUGS (ALT 637 FOR MEDICARE OP): Performed by: NURSE PRACTITIONER

## 2025-06-26 PROCEDURE — 97165 OT EVAL LOW COMPLEX 30 MIN: CPT | Mod: GO

## 2025-06-26 PROCEDURE — 2500000004 HC RX 250 GENERAL PHARMACY W/ HCPCS (ALT 636 FOR OP/ED): Performed by: NURSE PRACTITIONER

## 2025-06-26 PROCEDURE — 97535 SELF CARE MNGMENT TRAINING: CPT | Mod: GO

## 2025-06-26 PROCEDURE — 83735 ASSAY OF MAGNESIUM: CPT | Performed by: NURSE PRACTITIONER

## 2025-06-26 PROCEDURE — 99232 SBSQ HOSP IP/OBS MODERATE 35: CPT | Performed by: NURSE PRACTITIONER

## 2025-06-26 PROCEDURE — 85027 COMPLETE CBC AUTOMATED: CPT | Performed by: NURSE PRACTITIONER

## 2025-06-26 PROCEDURE — 36415 COLL VENOUS BLD VENIPUNCTURE: CPT | Performed by: NURSE PRACTITIONER

## 2025-06-26 PROCEDURE — 80069 RENAL FUNCTION PANEL: CPT | Performed by: NURSE PRACTITIONER

## 2025-06-26 PROCEDURE — 2500000005 HC RX 250 GENERAL PHARMACY W/O HCPCS: Performed by: NURSE PRACTITIONER

## 2025-06-26 PROCEDURE — 82947 ASSAY GLUCOSE BLOOD QUANT: CPT

## 2025-06-26 PROCEDURE — 1200000002 HC GENERAL ROOM WITH TELEMETRY DAILY

## 2025-06-26 RX ORDER — METOPROLOL TARTRATE 1 MG/ML
5 INJECTION, SOLUTION INTRAVENOUS ONCE
Status: COMPLETED | OUTPATIENT
Start: 2025-06-26 | End: 2025-06-26

## 2025-06-26 RX ORDER — ONDANSETRON HYDROCHLORIDE 2 MG/ML
4 INJECTION, SOLUTION INTRAVENOUS ONCE
Status: COMPLETED | OUTPATIENT
Start: 2025-06-26 | End: 2025-06-26

## 2025-06-26 RX ADMIN — CEFAZOLIN SODIUM 2 G: 2 INJECTION, SOLUTION INTRAVENOUS at 04:04

## 2025-06-26 RX ADMIN — HEPARIN SODIUM 5000 UNITS: 5000 INJECTION, SOLUTION INTRAVENOUS; SUBCUTANEOUS at 08:48

## 2025-06-26 RX ADMIN — METOPROLOL TARTRATE 5 MG: 1 INJECTION, SOLUTION INTRAVENOUS at 10:44

## 2025-06-26 RX ADMIN — Medication 0.5 L/MIN: at 12:40

## 2025-06-26 RX ADMIN — DIBASIC SODIUM PHOSPHATE, MONOBASIC POTASSIUM PHOSPHATE AND MONOBASIC SODIUM PHOSPHATE 250 MG: 852; 155; 130 TABLET ORAL at 17:28

## 2025-06-26 RX ADMIN — SODIUM CHLORIDE 500 ML: 0.9 INJECTION, SOLUTION INTRAVENOUS at 13:25

## 2025-06-26 RX ADMIN — ONDANSETRON 4 MG: 2 INJECTION INTRAMUSCULAR; INTRAVENOUS at 12:32

## 2025-06-26 RX ADMIN — AMIODARONE HYDROCHLORIDE 150 MG: 1.5 INJECTION, SOLUTION INTRAVENOUS at 12:51

## 2025-06-26 RX ADMIN — PRAVASTATIN SODIUM 80 MG: 40 TABLET ORAL at 22:43

## 2025-06-26 RX ADMIN — Medication 1 TABLET: at 17:28

## 2025-06-26 RX ADMIN — AMIODARONE HYDROCHLORIDE 0.5 MG/MIN: 1.8 INJECTION, SOLUTION INTRAVENOUS at 19:02

## 2025-06-26 RX ADMIN — SENNOSIDES AND DOCUSATE SODIUM 2 TABLET: 50; 8.6 TABLET ORAL at 22:43

## 2025-06-26 RX ADMIN — AMIODARONE HYDROCHLORIDE 1 MG/MIN: 1.8 INJECTION, SOLUTION INTRAVENOUS at 13:00

## 2025-06-26 RX ADMIN — METOPROLOL TARTRATE 5 MG: 1 INJECTION, SOLUTION INTRAVENOUS at 11:50

## 2025-06-26 RX ADMIN — HEPARIN SODIUM 5000 UNITS: 5000 INJECTION, SOLUTION INTRAVENOUS; SUBCUTANEOUS at 00:08

## 2025-06-26 RX ADMIN — HEPARIN SODIUM 5000 UNITS: 5000 INJECTION, SOLUTION INTRAVENOUS; SUBCUTANEOUS at 17:28

## 2025-06-26 RX ADMIN — OXYCODONE 5 MG: 5 TABLET ORAL at 04:00

## 2025-06-26 RX ADMIN — CEFAZOLIN SODIUM 2 G: 2 INJECTION, SOLUTION INTRAVENOUS at 12:01

## 2025-06-26 RX ADMIN — METOPROLOL TARTRATE 5 MG: 1 INJECTION, SOLUTION INTRAVENOUS at 12:40

## 2025-06-26 RX ADMIN — METOPROLOL TARTRATE 12.5 MG: 25 TABLET, FILM COATED ORAL at 08:47

## 2025-06-26 RX ADMIN — POLYETHYLENE GLYCOL 3350 17 G: 17 POWDER, FOR SOLUTION ORAL at 08:48

## 2025-06-26 RX ADMIN — ASPIRIN 81 MG: 81 TABLET, CHEWABLE ORAL at 08:47

## 2025-06-26 RX ADMIN — METOPROLOL TARTRATE 12.5 MG: 25 TABLET, FILM COATED ORAL at 22:42

## 2025-06-26 ASSESSMENT — PAIN DESCRIPTION - ORIENTATION: ORIENTATION: RIGHT

## 2025-06-26 ASSESSMENT — PAIN SCALES - GENERAL
PAINLEVEL_OUTOF10: 0 - NO PAIN
PAINLEVEL_OUTOF10: 6
PAINLEVEL_OUTOF10: 1
PAINLEVEL_OUTOF10: 0 - NO PAIN
PAINLEVEL_OUTOF10: 0 - NO PAIN

## 2025-06-26 ASSESSMENT — PAIN DESCRIPTION - LOCATION: LOCATION: CHEST

## 2025-06-26 ASSESSMENT — ACTIVITIES OF DAILY LIVING (ADL)
HOME_MANAGEMENT_TIME_ENTRY: 12
BATHING_ASSISTANCE: MINIMAL

## 2025-06-26 ASSESSMENT — COGNITIVE AND FUNCTIONAL STATUS - GENERAL
DAILY ACTIVITIY SCORE: 19
HELP NEEDED FOR BATHING: A LITTLE
DRESSING REGULAR UPPER BODY CLOTHING: A LITTLE
DRESSING REGULAR LOWER BODY CLOTHING: A LITTLE
TOILETING: A LITTLE
PERSONAL GROOMING: A LITTLE

## 2025-06-26 ASSESSMENT — PAIN DESCRIPTION - DESCRIPTORS: DESCRIPTORS: ACHING;SORE

## 2025-06-26 NOTE — PROGRESS NOTES
06/26/25 1503   Discharge Planning   Living Arrangements Spouse/significant other   Support Systems Spouse/significant other;Family members   Who is requesting discharge planning? Provider   Home or Post Acute Services None   Expected Discharge Disposition Home   Does the patient need discharge transport arranged? No     Assessment Note:  Met with patient and wife and Introduced myself as care coordinator and member of the Care Transitions team for discharge planning. Patient demographics and contact information verified.     Transportation: will not need   Pharmacy:[facility]  DME:walker, cane  HC: requesting

## 2025-06-26 NOTE — SIGNIFICANT EVENT
.Rapid Response Nurse Note: RADAR alert: 7    Pager time: 110  Arrival time: 110  Event end time:   Location: [place] [address]  [] Triage by phone or secure messaging    Rapid response initiated by:  [] Rapid response RN [] Family [] Nursing Supervisor [] Physician   [x] RADAR auto page [] Sepsis auto-page [] RN [] RT   [] NP/PA [] Other:     Primary reason for call:   [] BAT [] New CPAP/BiPAP [] Bleeding [] Change in mental status   [] Chest pain [] Code blue [] FiO2 >/= 50% [] HR </= 40 bpm   [] HR >/= 130 bpm [] Hyperglycemia [] Hypoglycemia [x] RADAR    [] RR </= 8 bpm [] RR >/= 30 bpm [] SBP </= 90 mmHg [] SpO2 < 90%   [] Seizure [] Sepsis [] Shortness of breath  [] Staff concern: see comments     Initial VS and/or RADAR VS: T 37.1 °C; ; RR 18; BP 98/65; SPO2 92%.      Interventions:  [x] None [] ABG/VBG [] Assist w/ICU transfer [] BAT paged    [] Bag mask [] Blood [] Cardioversion [] Code Blue   [] Code blue for intubation [] Code status changed [] Chest x-ray [] EKG   [] IV fluid/bolus [] KUB x-ray [] Labs/cultures [] Medication   [] Nebulizer treatment [] NIPPV (CPAP/BiPAP) [] Oxygen [] Oral airway   [] Peripheral IV [] Palliative care consult [] CT/MRI [] Sepsis protocol    [] Suctioned [] Other:     Outcome:  [] Coded and  [] Code blue for intubation [] Coded and transferred to ICU []  on division   [x] Remained on division (no change) [] Remained on division + additional monitoring [] Remained in ED [] Transferred to ED   [] Transferred to ICU [] Transferred to inpatient status [] Transferred for interventions (procedure) [] Transferred to ICU stepdown    [] Transferred to surgery [] Transferred to telemetry [] Sepsis protocol [] STEMI protocol   [] Stroke protocol [x] Bedside nurse instructed to page rapid response for any concerns or acute change in condition/VS     Additional Comments: RADAR 7    Upon arrival to division, Bedside RN notified of radar page and vital signs associated  with it.   Per Bedside RN, patient's rhythm went from NSR to Afib RVR with rates of 150-160's around 1000 today. Patient on oral metoprolol and given additional 5mg IV metoprolol. HR slowly coming down with rates currently between 120'-130's. Primary Team aware and will continue to adjust medication dosing as needed. No interventions needed by Rapid Response at this time.    Notified Bedside Staff to page Rapid Response with any concerns or changes in patient's condition.

## 2025-06-26 NOTE — PROGRESS NOTES
Occupational Therapy    Evaluation/Treatment    Patient Name: Yosvany Chase  MRN: [id number]  Department: [facility] 3  Room: [address] [place] [address]  Today's Date: 06/26/25  Time Calculation  Start Time: 0939  Stop Time: 1011  Time Calculation (min): 32 min       Assessment:  OT Assessment: difficulty I/ADLS, safety, fxnl mob  Prognosis: Good  Barriers to Discharge Home: No anticipated barriers  Evaluation/Treatment Tolerance: Patient tolerated treatment well  Medical Staff Made Aware: Yes  End of Session Communication: Bedside nurse  End of Session Patient Position: Up in chair, Alarm off, caregiver present  OT Assessment Results: Decreased ADL status, Decreased upper extremity strength, Decreased endurance, Decreased functional mobility, Decreased IADLs  Prognosis: Good  Barriers to Discharge: None  Evaluation/Treatment Tolerance: Patient tolerated treatment well  Medical Staff Made Aware: Yes  Strengths: Attitude of self, Support of Caregivers  Barriers to Participation: Comorbidities  Plan:  Treatment Interventions: ADL retraining, Functional transfer training, UE strengthening/ROM, Endurance training, Patient/family training, Equipment evaluation/education, Compensatory technique education  OT Frequency: 2 times per week  OT Discharge Recommendations: Low intensity level of continued care  Equipment Recommended upon Discharge:  (shower chair, hpi kit)  OT Recommended Transfer Status: Assist of 1  OT - OK to Discharge: Yes  Treatment Interventions: ADL retraining, Functional transfer training, UE strengthening/ROM, Endurance training, Patient/family training, Equipment evaluation/education, Compensatory technique education    Subjective   Current Problem:  1. Atherosclerosis of native coronary artery of native heart, unspecified whether angina present  Height and weight    Surgical preparation    Admit to inpatient    Full code    Inpatient consult to Respiratory Care    Height and weight    Surgical preparation    Admit to  inpatient    Full code    Inpatient consult to Respiratory Care    CANCELED: NPO Diet Except: Sips with meds; Effective now    CANCELED: Insert and maintain peripheral IV    CANCELED: Saline lock IV    CANCELED: Type And Screen    CANCELED: Bath/Shower with Chlorhexidine Gluconate    CANCELED: NPO Diet Except: Sips with meds; Effective now    CANCELED: Insert and maintain peripheral IV    CANCELED: Saline lock IV    CANCELED: Bath/Shower with Chlorhexidine Gluconate      2. Angina pectoris with coronary microvascular dysfunction  Anesthesia Intraoperative Transesophageal Echocardiogram    Anesthesia Intraoperative Transesophageal Echocardiogram      3. Coronary artery disease due to calcified coronary lesion  Anesthesia Intraoperative Transesophageal Echocardiogram    Anesthesia Intraoperative Transesophageal Echocardiogram      4. Atherosclerotic heart disease of native coronary artery with other forms of angina pectoris  Anesthesia Intraoperative Transesophageal Echocardiogram        OT Visit Info:  OT Received On: 06/26/25  General Visit Info:   General  Reason for Referral: status post CABGx3, JAZMIN  Past Medical History Relevant to Rehab: history of HLD and CAD  Family/Caregiver Present: Yes  Caregiver Feedback: wife  Prior to Session Communication: Bedside nurse (NP present end session)  Patient Position Received: Up in chair, Alarm off, caregiver present   Precautions:  Medical Precautions: Cardiac precautions, Fall precautions, Chest tube  Post-Surgical Precautions: Move in the Tube     Date/Time Vitals Session Patient Position Pulse Resp SpO2 BP MAP (mmHg)    06/26/25 1033 --  --  160  18  92 %  104/76  86     06/26/25 1102 --  --  139  18  --  120/72  79     06/26/25 1103 --  --  156  --  --  101/76  --     06/26/25 1107 --  --  137  --  --  98/65  76     06/26/25 1143 --  --  156  --  --  109/91  --     06/26/25 1150 --  --  132  --  --  115/79  87                 Pain:  Pain Assessment  Pain Assessment:  0-10  0-10 (Numeric) Pain Score: 1  Pain Location: Incision    Objective   Cognition:  Overall Cognitive Status: Within Functional Limits  Orientation Level: Oriented X4  Insight: Within function limits           Home Living:  Type of Home: House  Lives With: Spouse  Home Adaptive Equipment: Walker rolling or standard  Home Layout: Two level, Full bath main level (bed/bath 1st floor)  Home Access:  (3 AMELIE)  Bathroom Shower/Tub: Tub/shower unit  Bathroom Toilet: Standard  Prior Function:  Level of Edgar: Independent with ADLs and functional transfers, Independent with homemaking with ambulation  Vocational: Part time employment (office work)  Leisure: family, - pets  Hand Dominance: Right  IADL History:  IADL Comments: I I/ADLs, drives horse/buggy  ADL:  Eating Assistance: Independent  Grooming Assistance:  (pt performed handwashing standing sinkside SBA WHW)  Bathing Assistance: Minimal (anticipated)  UE Dressing Assistance: Independent  LE Dressing Assistance: Minimal  Toileting Assistance with Device:  (pt performed urinating standing S WhW toilet)  Functional Deficit: Steadying, Setup, Verbal cueing, Supervision/safety, Increased time to complete      Activity Tolerance:  Endurance:  (fair)       Bed Mobility/Transfers:      Transfers  Transfer:  (sit/stand 2x CGA WHW)      Functional Mobility:  Functional Mobility  Functional Mobility Performed:  (pt performed fxnl mob to/from chair 1 hallway distance bathroom to chair CGA WHW, extended time for mob)  Sitting Balance:  Dynamic Sitting Balance  Dynamic Sitting-Level of Assistance: Independent  Standing Balance:  Dynamic Standing Balance  Dynamic Standing-Level of Assistance: Contact guard    Therapy/Activity: Therapeutic Activity  Therapeutic Activity Performed:  (OT provided education and demo pt and wife MITT I/ADLs, AE, LBD and UBD with prec, education demo BUE shoulder flex/ext 0-90, post shoulder rolls, neck flex/ext, neck roation 3x10/day as teddy)      Vision:Vision - Basic Assessment  Current Vision: Wears glasses all the time  Sensation:  Light Touch: No apparent deficits  Strength:  Strength Comments: BUE shoulder 3+/5, distal 4/5     Coordination:  Movements are Fluid and Coordinated: Yes   Hand Function:  Hand Function  Gross Grasp: Functional  Extremities: RUE   RUE :  (shoulder 0-110 distal WFL) and ROXYE   ROXYE:  (shoulder 0-110 distal WFL)      Outcome Measures: [facility] Daily Activity  Putting on and taking off regular lower body clothing: A little  Bathing (including washing, rinsing, drying): A little  Putting on and taking off regular upper body clothing: A little  Toileting, which includes using toilet, bedpan or urinal: A little  Taking care of personal grooming such as brushing teeth: A little  Eating Meals: None  Daily Activity - Total Score: 19         and OT Adult Other Outcome Measures  4AT: -    Education Documentation  Handouts, taught by Frances Sepulveda OT at 6/26/2025 12:22 PM.  Learner: Significant Other, Patient  Readiness: Acceptance  Method: Explanation, Demonstration  Response: Verbalizes Understanding, Needs Reinforcement  Comment: KANDIS I/ADLs AE    Body Mechanics, taught by Frances Sepulveda OT at 6/26/2025 12:22 PM.  Learner: Significant Other, Patient  Readiness: Acceptance  Method: Explanation, Demonstration  Response: Verbalizes Understanding, Needs Reinforcement  Comment: MITT, I/ADLs AE    Precautions, taught by Frances Sepulveda OT at 6/26/2025 12:22 PM.  Learner: Significant Other, Patient  Readiness: Acceptance  Method: Explanation, Demonstration  Response: Verbalizes Understanding, Needs Reinforcement  Comment: MITRED I/ADLs AE    ADL Training, taught by Frances Sepulveda OT at 6/26/2025 12:22 PM.  Learner: Significant Other, Patient  Readiness: Acceptance  Method: Explanation, Demonstration  Response: Verbalizes Understanding, Needs Reinforcement  Comment: MITT, I/ADLs AE    Education Comments  No comments  found.          Goals:  Encounter Problems       Encounter Problems (Active)       ADLs       Patient will perform UB and LB bathing  with modified independent level of assistance AE (Progressing)       Start:  06/26/25    Expected End:  07/17/25            Patient with complete upper body dressing with independent level of assistance  (Progressing)       Start:  06/26/25    Expected End:  07/17/25            Patient with complete lower body dressing with modified independent level of assistance donning and doffing all LE clothes  with PRN adaptive equipment (Progressing)       Start:  06/26/25    Expected End:  07/17/25            Patient will complete daily grooming tasks  with independent level of assistance (Progressing)       Start:  06/26/25    Expected End:  07/17/25            Patient will complete toileting including hygiene clothing management/hygiene with modified independent level of assistance  (Progressing)       Start:  06/26/25    Expected End:  07/17/25               COGNITION/SAFETY       Patient will recall and adhere to precautions during all functional mobility/ADL tasks in order to demonstrate improved understanding and promote healing post op (Progressing)       Start:  06/26/25    Expected End:  07/17/25               EXERCISE/STRENGTHENING       Patient with increase BUE to wfl strength. (Progressing)       Start:  06/26/25    Expected End:  07/17/25               MOBILITY       Patient will perform Functional mobility max Household distances/Community Distances with modified independent level of assistance and least restrictive device in order to improve safety and functional mobility. (Progressing)       Start:  06/26/25    Expected End:  07/17/25               TRANSFERS       Patient will perform bed mobility independent level of assistance  (Progressing)       Start:  06/26/25    Expected End:  07/17/25            Patient will complete functional transferleast restrictive device with  modified independent level of assistance. (Progressing)       Start:  06/26/25    Expected End:  07/17/25

## 2025-06-26 NOTE — PROGRESS NOTES
"CARDIAC SURGERY DAILY PROGRESS NOTE    Yosvany Chase is a 74 y.o. male, with a PMH of hyperlipidemia, GERD, lumbar spine stenosis, phlebitis of leg, dermatitis, left subclavian stenosis and elevated CAC score (1408 2/5/2025) followed by + Nuclear stress test.     6/24/25 OPERATION/PROCEDURE:   #1 EVH Right Saphenous Vein  #2 CABG x 3, LIMA to LAD 30ml/3.0 PI, SVG to PDA sequenced to OM 63 ml/3.3 PI  #3 Left Atrial Appendage Ligation w/ 45mm AtriClip with Mei Herr MD    CTICU Course: Uneventful    Transferred to T3 on 6/25/25    ==========================================    Interval History:   No events overnight.     SUBJECTIVE:  Pleasant gentleman and wife in room at assessment. Patient sitting up in chair. Pain denoted as 1/10.    Objective   /68 (BP Location: Left arm, Patient Position: Lying)   Pulse 63   Temp 37.3 °C (99.1 °F) (Temporal)   Resp 18   Ht 1.651 m (5' 5\")   Wt 96.3 kg (212 lb 3.2 oz)   SpO2 93%   BMI 35.31 kg/m²   0-10 (Numeric) Pain Score: 6   3 Day Weight Change: Unable to Calculate    Intake and Output    Intake/Output Summary (Last 24 hours) at 6/26/2025 0749  Last data filed at 6/26/2025 0404  Gross per 24 hour   Intake 800 ml   Output 1290 ml   Net -490 ml       Physical Exam  Vitals and nursing note reviewed.   Constitutional:       Appearance: He is obese.   HENT:      Head: Normocephalic.      Nose: Nose normal.      Mouth/Throat:      Mouth: Mucous membranes are moist.   Eyes:      Conjunctiva/sclera: Conjunctivae normal.   Cardiovascular:      Rate and Rhythm: Normal rate and regular rhythm.      Pulses: Normal pulses.      Heart sounds: Normal heart sounds.      Comments: TELE: SR, rate 81 bpm, 60-80's  Later in am patient developed AF with RVR  Atrial and ventricular wires, set on VVI backup rate of 50, mA 3  Pulmonary:      Effort: Pulmonary effort is normal.      Breath sounds: Normal breath sounds.      Comments: RA  IS: 750 mL  Sternum stable  Abdominal:      " General: Bowel sounds are normal.      Palpations: Abdomen is soft.      Comments: Rounded abdomen  Consuming 20% of meals     Genitourinary:     Comments: Voiding independently  Musculoskeletal:      Cervical back: Neck supple.      Comments: MAEW, Strong 5/5  Ambulates with a walker   Skin:     General: Skin is warm and dry.      Capillary Refill: Capillary refill takes less than 2 seconds.      Comments: midsternal chest incision C/D/I, well approximated, no s/s infection     Neurological:      General: No focal deficit present.      Mental Status: He is alert and oriented to person, place, and time.   Psychiatric:         Mood and Affect: Mood normal.         Behavior: Behavior normal.       Medications  Scheduled medications  Scheduled Medications[1]Continuous medications  Continuous Medications[2]PRN medications  PRN Medications[3]    Labs  Results for orders placed or performed during the hospital encounter of 06/24/25 (from the past 24 hours)   POCT GLUCOSE   Result Value Ref Range    POCT Glucose 118 (H) 74 - 99 mg/dL         IMAGING/ DIAGNOSTIC TESTING:  I have personally reviewed the following test result(s):   XR chest 1 view 06/25/2025  Impression  1.  No gross evidence of pneumothorax status post extubation. Low  lung volumes and basilar atelectasis.  2. Right upper quadrant lucency may represent a small amount of  postoperative pneumoperitoneum. Correlate with any concern for  perforation.  Signed by: Jason Lucas 6/25/2025 12:45 PM       IMPRESSION & PLAN:  POD # 2 s/p EVH Right Saphenous Vein, CABG x 3, LIMA to LAD 30ml/3.0 PI, SVG to PDA sequenced to OM 63 ml/3.3 PI, Left Atrial Appendage Ligation w/ 45mm AtriClip with Mei Herr MD  - Increase activity/ ambulation; PT/OT  - Encourage IS, C/DB; respiratory therapy; wean O2 as teddy   - Cardiac rehab referral   - Continue cardiac meds: ASA, BB, statin   - Pain and anticonstipation meds  - 2 mediastinal in place to -20cm suction; - Maintain  chest tubes until output down/removal ok with surgeon; monitor daily 1v PCXR while chest tubes in place; 6/26 not meeting criteria for discontinuation 2/2 drainage  - 2v CXR To be completed prior to discharge  - Postop echo N/A  - Remove epicardial wires prior to discharge   - Tele until discharge  - Optimize nutrition and electrolytes    Rhythm  - Tele: SR, 81 bpm, 60-80's  - Continue BB  - Adjust medications as tolerated  - 6/26 AF with RVR with rates in 160 bpm; amio bolus and infusion ordered following metoprolol 5 mg IVP x3. Rates beginning to decrease. One BP of 86 sys necessitated a 250 mL NS bolus  x1 with good response.     Acute Blood Loss Anemia   Recent Labs     06/25/25  0028 06/24/25  1441 06/20/25  1409 05/23/25  0718   HGB 9.9* 12.3* 14.1 15.7   HCT 31.2* 38.7* 43.4 45.5   - MV, PO Iron x1mo  - Daily labs, transfuse as indicated  - 6/26 No iron, MVI initiated    Thrombocytopenia  Recent Labs     06/25/25  0028 06/24/25  1441 06/20/25  1409 05/23/25  0718   * 155 199 196   - Etiology likely postop/CPB related  - Continue to trend with daily CBCs    Volume/Electrolyte Status: Preop wt Weight: 91.4 kg (201 lb 8 oz)   Vitals:    06/26/25 0552   Weight: 96.3 kg (212 lb 3.2 oz)   Baseline Cr 0.87  - Weight: 96.3  - Adjust diuresis as needed for postop cardiac surgery hypervolemia  - Replete electrolytes for hypokalemia / hypomagnesemia / hypophosphatemia as needed; 6/26 Sodium phos replace  - Daily weights and strict I&Os  - Daily RFP while admitted    Hyperlipidemia: Home Meds: Pravastatin 20 mg daily  Lab Results   Component Value Date    CHOL 237 (H) 10/07/2024    HDL 45.0 10/07/2024    VLDL 31 10/07/2024    TRIG 155 (H) 10/07/2024    NHDL 192 (H) 10/07/2024   - Continue Pravastatin  - Follow up lipid panel with PCP/ cardiologist for ongoing lipid management    Leukocytosis  Recent Labs     06/25/25  0028 06/24/25  1441 06/20/25  1409 05/23/25  0718   WBC 13.8* 21.9* 6.6 6.2     Temp (36hrs),  Av.4 °C (99.3 °F), Min:36.8 °C (98.2 °F), Max:37.8 °C (100 °F)     - Aggressive pulmonary hygiene  - Monitor for s/s infection  - Likely atelectasis/ postoperative in etiology  - Daily CBC to follow    VTE Prophylaxis: SCDs/TEDs, ambulation, SQ heparin  Code Status: Full Code    Dispo  - PT/OT recs Low Intensity Level of Care; Mobility [place]: 18; ok for discharge  - Would benefit from homecare for cardiac surgery carepath and RN visits  - Anticipate discharge 2-3 days, pending post op BM, completion of diagnostics, adequate diuresis, and rhythm control.   - Will continue to assess discharge needs      Halley Fitzgerald, APRN-CNP, APRN-CNS  Cardiac Surgery [facility]  [facility]  Team Phone [phone]    2025  7:49 AM         [1] acetaminophen, 975 mg, oral, q6h  aspirin, 81 mg, oral, Daily  ceFAZolin, 2 g, intravenous, q8h  heparin, 5,000 Units, subcutaneous, q8h  insulin lispro, 0-5 Units, subcutaneous, TID AC  lidocaine, 1 patch, transdermal, q24h  metoprolol tartrate, 12.5 mg, oral, BID  mupirocin, , Each Nostril, BID  oxygen, , inhalation, Continuous - Inhalation  polyethylene glycol, 17 g, oral, Daily  pravastatin, 80 mg, oral, Nightly  sennosides-docusate sodium, 2 tablet, oral, BID    [2]    [3] PRN medications: HYDROmorphone, oxyCODONE, oxyCODONE, oxygen

## 2025-06-27 ENCOUNTER — APPOINTMENT (OUTPATIENT)
Dept: RADIOLOGY | Facility: HOSPITAL | Age: 75
DRG: 303 | End: 2025-06-27
Payer: MEDICARE

## 2025-06-27 DIAGNOSIS — I25.84 CORONARY ARTERY DISEASE DUE TO CALCIFIED CORONARY LESION: ICD-10-CM

## 2025-06-27 DIAGNOSIS — I25.118 ATHEROSCLEROTIC HEART DISEASE OF NATIVE CORONARY ARTERY WITH OTHER FORMS OF ANGINA PECTORIS: ICD-10-CM

## 2025-06-27 DIAGNOSIS — I25.10 CORONARY ARTERY DISEASE DUE TO CALCIFIED CORONARY LESION: ICD-10-CM

## 2025-06-27 LAB
ALBUMIN SERPL BCP-MCNC: 3.7 G/DL (ref 3.4–5)
ANION GAP SERPL CALC-SCNC: 11 MMOL/L (ref 10–20)
BUN SERPL-MCNC: 23 MG/DL (ref 6–23)
CALCIUM SERPL-MCNC: 8.6 MG/DL (ref 8.6–10.6)
CHLORIDE SERPL-SCNC: 101 MMOL/L (ref 98–107)
CO2 SERPL-SCNC: 27 MMOL/L (ref 21–32)
CREAT SERPL-MCNC: 0.67 MG/DL (ref 0.5–1.3)
EGFRCR SERPLBLD CKD-EPI 2021: >90 ML/MIN/1.73M*2
ERYTHROCYTE [DISTWIDTH] IN BLOOD BY AUTOMATED COUNT: 13.6 % (ref 11.5–14.5)
GLUCOSE BLD MANUAL STRIP-MCNC: 122 MG/DL (ref 74–99)
GLUCOSE BLD MANUAL STRIP-MCNC: 123 MG/DL (ref 74–99)
GLUCOSE BLD MANUAL STRIP-MCNC: 96 MG/DL (ref 74–99)
GLUCOSE SERPL-MCNC: 119 MG/DL (ref 74–99)
HCT VFR BLD AUTO: 29.6 % (ref 41–52)
HGB BLD-MCNC: 9.8 G/DL (ref 13.5–17.5)
MAGNESIUM SERPL-MCNC: 2.19 MG/DL (ref 1.6–2.4)
MCH RBC QN AUTO: 31.8 PG (ref 26–34)
MCHC RBC AUTO-ENTMCNC: 33.1 G/DL (ref 32–36)
MCV RBC AUTO: 96 FL (ref 80–100)
NRBC BLD-RTO: 0 /100 WBCS (ref 0–0)
PHOSPHATE SERPL-MCNC: 2.1 MG/DL (ref 2.5–4.9)
PLATELET # BLD AUTO: 143 X10*3/UL (ref 150–450)
POTASSIUM SERPL-SCNC: 4 MMOL/L (ref 3.5–5.3)
RBC # BLD AUTO: 3.08 X10*6/UL (ref 4.5–5.9)
SODIUM SERPL-SCNC: 135 MMOL/L (ref 136–145)
WBC # BLD AUTO: 11.4 X10*3/UL (ref 4.4–11.3)

## 2025-06-27 PROCEDURE — 2500000004 HC RX 250 GENERAL PHARMACY W/ HCPCS (ALT 636 FOR OP/ED): Performed by: NURSE PRACTITIONER

## 2025-06-27 PROCEDURE — 82947 ASSAY GLUCOSE BLOOD QUANT: CPT

## 2025-06-27 PROCEDURE — 2500000001 HC RX 250 WO HCPCS SELF ADMINISTERED DRUGS (ALT 637 FOR MEDICARE OP): Performed by: NURSE PRACTITIONER

## 2025-06-27 PROCEDURE — 99221 1ST HOSP IP/OBS SF/LOW 40: CPT | Performed by: STUDENT IN AN ORGANIZED HEALTH CARE EDUCATION/TRAINING PROGRAM

## 2025-06-27 PROCEDURE — 80069 RENAL FUNCTION PANEL: CPT | Performed by: NURSE PRACTITIONER

## 2025-06-27 PROCEDURE — 85027 COMPLETE CBC AUTOMATED: CPT | Performed by: NURSE PRACTITIONER

## 2025-06-27 PROCEDURE — 71045 X-RAY EXAM CHEST 1 VIEW: CPT

## 2025-06-27 PROCEDURE — 1200000002 HC GENERAL ROOM WITH TELEMETRY DAILY

## 2025-06-27 PROCEDURE — 36415 COLL VENOUS BLD VENIPUNCTURE: CPT | Performed by: NURSE PRACTITIONER

## 2025-06-27 PROCEDURE — 99232 SBSQ HOSP IP/OBS MODERATE 35: CPT | Performed by: NURSE PRACTITIONER

## 2025-06-27 PROCEDURE — 83735 ASSAY OF MAGNESIUM: CPT | Performed by: NURSE PRACTITIONER

## 2025-06-27 RX ADMIN — METOPROLOL TARTRATE 12.5 MG: 25 TABLET, FILM COATED ORAL at 20:11

## 2025-06-27 RX ADMIN — HEPARIN SODIUM 5000 UNITS: 5000 INJECTION, SOLUTION INTRAVENOUS; SUBCUTANEOUS at 09:34

## 2025-06-27 RX ADMIN — AMIODARONE HYDROCHLORIDE 0.5 MG/MIN: 1.8 INJECTION, SOLUTION INTRAVENOUS at 06:53

## 2025-06-27 RX ADMIN — AMIODARONE HYDROCHLORIDE 0.5 MG/MIN: 1.8 INJECTION, SOLUTION INTRAVENOUS at 18:21

## 2025-06-27 RX ADMIN — ASPIRIN 81 MG: 81 TABLET, CHEWABLE ORAL at 09:34

## 2025-06-27 RX ADMIN — PRAVASTATIN SODIUM 80 MG: 40 TABLET ORAL at 20:10

## 2025-06-27 RX ADMIN — HEPARIN SODIUM 5000 UNITS: 5000 INJECTION, SOLUTION INTRAVENOUS; SUBCUTANEOUS at 16:46

## 2025-06-27 RX ADMIN — METOPROLOL TARTRATE 12.5 MG: 25 TABLET, FILM COATED ORAL at 09:34

## 2025-06-27 RX ADMIN — HEPARIN SODIUM 5000 UNITS: 5000 INJECTION, SOLUTION INTRAVENOUS; SUBCUTANEOUS at 01:27

## 2025-06-27 ASSESSMENT — PAIN - FUNCTIONAL ASSESSMENT
PAIN_FUNCTIONAL_ASSESSMENT: 0-10
PAIN_FUNCTIONAL_ASSESSMENT: 0-10

## 2025-06-27 ASSESSMENT — PAIN SCALES - GENERAL
PAINLEVEL_OUTOF10: 0 - NO PAIN
PAINLEVEL_OUTOF10: 0 - NO PAIN

## 2025-06-27 NOTE — PROGRESS NOTES
"CARDIAC SURGERY DAILY PROGRESS NOTE    Yosvany Chase is a 74 y.o. male, with a PMH of hyperlipidemia, GERD, lumbar spine stenosis, phlebitis of leg, dermatitis, left subclavian stenosis and elevated CAC score (1408 2/5/2025) followed by + Nuclear stress test.     6/24/25 OPERATION/PROCEDURE:   #1 EVH Right Saphenous Vein  #2 CABG x 3, LIMA to LAD 30ml/3.0 PI, SVG to PDA sequenced to OM 63 ml/3.3 PI  #3 Left Atrial Appendage Ligation w/ 45mm AtriClip with Mei Herr MD    CTICU Course: Uneventful    Transferred to T3 on 6/25/25    ==========================================    Interval History:   No events overnight. Patient in AF yesterday and finally converted at 01:20 am this am with BB and amio bolus / infusion.     SUBJECTIVE:  Pleasant gentleman and wife in room at assessment. Patient sitting up in chair.     Objective   /67   Pulse 69   Temp 37.1 °C (98.8 °F)   Resp 17   Ht 1.651 m (5' 5\")   Wt 94.4 kg (208 lb 3.2 oz)   SpO2 96%   BMI 34.65 kg/m²   0-10 (Numeric) Pain Score: 0 - No pain   3 Day Weight Change: 1.013 kg (2 lb 3.7 oz) per day    Intake and Output    Intake/Output Summary (Last 24 hours) at 6/27/2025 1216  Last data filed at 6/27/2025 0934  Gross per 24 hour   Intake 640 ml   Output 640 ml   Net 0 ml       Physical Exam  Vitals and nursing note reviewed.   Constitutional:       Appearance: He is obese.   HENT:      Head: Normocephalic.      Nose: Nose normal.      Mouth/Throat:      Mouth: Mucous membranes are moist.   Eyes:      Conjunctiva/sclera: Conjunctivae normal.   Cardiovascular:      Rate and Rhythm: Normal rate and regular rhythm.      Pulses: Normal pulses.      Heart sounds: Normal heart sounds.      Comments: TELE: SR, rate 75 bpm, 's  Converted to SR this am at 01:30 am  Atrial and ventricular wires, set on VVI backup rate of 50, mA 3  Pulmonary:      Effort: Pulmonary effort is normal.      Breath sounds: Normal breath sounds.      Comments: RA  IS: 750 " mL  Sternum stable  Abdominal:      General: Bowel sounds are normal.      Palpations: Abdomen is soft.      Comments: Rounded abdomen  Consuming 50% of meals  BM (+) 6/27     Genitourinary:     Comments: Voiding independently  Musculoskeletal:      Cervical back: Neck supple.      Comments: MAEW, Strong 5/5  Ambulates with a walker   Skin:     General: Skin is warm and dry.      Capillary Refill: Capillary refill takes less than 2 seconds.      Comments: midsternal chest incision C/D/I, well approximated, no s/s infection     Neurological:      General: No focal deficit present.      Mental Status: He is alert and oriented to person, place, and time.   Psychiatric:         Mood and Affect: Mood normal.         Behavior: Behavior normal.       Medications  Scheduled medications  Scheduled Medications[1]Continuous medications  Continuous Medications[2]PRN medications  PRN Medications[3]    Labs  Results for orders placed or performed during the hospital encounter of 06/24/25 (from the past 24 hours)   POCT GLUCOSE   Result Value Ref Range    POCT Glucose 135 (H) 74 - 99 mg/dL   Magnesium   Result Value Ref Range    Magnesium 2.19 1.60 - 2.40 mg/dL   CBC   Result Value Ref Range    WBC 11.4 (H) 4.4 - 11.3 x10*3/uL    nRBC 0.0 0.0 - 0.0 /100 WBCs    RBC 3.08 (L) 4.50 - 5.90 x10*6/uL    Hemoglobin 9.8 (L) 13.5 - 17.5 g/dL    Hematocrit 29.6 (L) 41.0 - 52.0 %    MCV 96 80 - 100 fL    MCH 31.8 26.0 - 34.0 pg    MCHC 33.1 32.0 - 36.0 g/dL    RDW 13.6 11.5 - 14.5 %    Platelets 143 (L) 150 - 450 x10*3/uL   Renal Function Panel   Result Value Ref Range    Glucose 119 (H) 74 - 99 mg/dL    Sodium 135 (L) 136 - 145 mmol/L    Potassium 4.0 3.5 - 5.3 mmol/L    Chloride 101 98 - 107 mmol/L    Bicarbonate 27 21 - 32 mmol/L    Anion Gap 11 10 - 20 mmol/L    Urea Nitrogen 23 6 - 23 mg/dL    Creatinine 0.67 0.50 - 1.30 mg/dL    eGFR >90 >60 mL/min/1.73m*2    Calcium 8.6 8.6 - 10.6 mg/dL    Phosphorus 2.1 (L) 2.5 - 4.9 mg/dL    Albumin  3.7 3.4 - 5.0 g/dL   POCT GLUCOSE   Result Value Ref Range    POCT Glucose 122 (H) 74 - 99 mg/dL         IMAGING/ DIAGNOSTIC TESTING:  I have personally reviewed the following test result(s):   XR chest 1 view 06/25/2025  Impression  1.  No gross evidence of pneumothorax status post extubation. Low  lung volumes and basilar atelectasis.  2. Right upper quadrant lucency may represent a small amount of  postoperative pneumoperitoneum. Correlate with any concern for  perforation.  Signed by: Jason Lucas 6/25/2025 12:45 PM       IMPRESSION & PLAN:  POD # 3 s/p EVH Right Saphenous Vein, CABG x 3, LIMA to LAD 30ml/3.0 PI, SVG to PDA sequenced to OM 63 ml/3.3 PI, Left Atrial Appendage Ligation w/ 45mm AtriClip with Mei Herr MD  - Increase activity/ ambulation; PT/OT  - Encourage IS, C/DB; respiratory therapy; wean O2 as teddy   - Cardiac rehab referral   - Continue cardiac meds: ASA, BB, statin   - Pain and anticonstipation meds  - 2 mediastinal in place to -20cm suction; - Maintain chest tubes until output down/removal ok with surgeon; monitor daily 1v PCXR while chest tubes in place; 6/27 Will discontinue chest tubes  - 2v CXR To be completed prior to discharge  - Postop echo N/A  - Remove epicardial wires prior to discharge   - Tele until discharge  - Optimize nutrition and electrolytes  - 6/26 Noted pneumoperitoneum on XR since post op; consulted with fellow and felt this was likely operative in nature; sent secure chat to surgeon  - 6/27 Discussed with Dr. Herr; decided to do a general surgery consult; gen surgery recommended CT of chest/A/P with IV and oral contrast; Dr. Herr agreeable; she is also ok with discontinuing chest tubes today    Rhythm  - Tele: SR, 75 bpm, 's  - Continue BB  - Adjust medications as tolerated  - 6/26 AF with RVR with rates in 160 bpm; amio bolus and infusion ordered following metoprolol 5 mg IVP x3. Rates beginning to decrease. One BP of 86 sys necessitated a 250 mL NS bolus   x1 with good response. Finally converted early in am with amio bolus and infusion   -  Consider po amio tomorrow    Acute Blood Loss Anemia   Recent Labs     25  0743 25  0702 25  0028 25  1441 25  1409 25  0718   HGB 9.8* 10.1* 9.9* 12.3* 14.1 15.7   HCT 29.6* 30.2* 31.2* 38.7* 43.4 45.5   - MV, PO Iron x1mo  - Daily labs, transfuse as indicated  -  No iron, MVI initiated    Thrombocytopenia  Recent Labs     25  0743 25  0702 25  0028 25  1441 25  1409 25  0718   * 137* 137* 155 199 196   - Etiology likely postop/CPB related  - Continue to trend with daily CBCs    Volume/Electrolyte Status: Preop wt Weight: 91.4 kg (201 lb 8 oz)   Vitals:    25 0655   Weight: 94.4 kg (208 lb 3.2 oz)   Baseline Cr 0.87  - Weight: 94.4, 96.3  - Adjust diuresis as needed for postop cardiac surgery hypervolemia  - Replete electrolytes for hypokalemia / hypomagnesemia / hypophosphatemia as needed;  Sodium phos replace  - Daily weights and strict I&Os  - Daily RFP while admitted    Hyperlipidemia: Home Meds: Pravastatin 20 mg daily  Lab Results   Component Value Date    CHOL 237 (H) 10/07/2024    HDL 45.0 10/07/2024    VLDL 31 10/07/2024    TRIG 155 (H) 10/07/2024    NHDL 192 (H) 10/07/2024   - Continue Pravastatin  - Follow up lipid panel with PCP/ cardiologist for ongoing lipid management    Leukocytosis  Recent Labs     25  0743 25  0702 25  0028 25  1441 25  1409 25  0718   WBC 11.4* 16.4* 13.8* 21.9* 6.6 6.2     Temp (36hrs), Av °C (98.6 °F), Min:36.5 °C (97.7 °F), Max:37.4 °C (99.3 °F)     - Aggressive pulmonary hygiene  - Monitor for s/s infection  - Likely atelectasis/ postoperative in etiology  - Daily CBC to follow    VTE Prophylaxis: SCDs/TEDs, ambulation, SQ heparin  Code Status: Full Code    Dispo  - PT/OT recs Low Intensity Level of Care; Mobility Scoare: 18; ok for discharge  - Would  benefit from homecare for cardiac surgery carepath and RN visits  - Anticipate discharge 2-3 days, pending completion of diagnostics, adequate diuresis, and rhythm control.   - Will continue to assess discharge needs      Halley Fitzgerald, APRN-CNP, APRN-CNS  Cardiac Surgery [facility]  [facility]  Team Phone [phone]    6/27/2025  12:16 PM           [1] acetaminophen, 975 mg, oral, q6h  aspirin, 81 mg, oral, Daily  heparin, 5,000 Units, subcutaneous, q8h  insulin lispro, 0-5 Units, subcutaneous, TID AC  lidocaine, 1 patch, transdermal, q24h  metoprolol tartrate, 12.5 mg, oral, BID  multivitamin with minerals iron-free, 1 tablet, oral, Daily  mupirocin, , Each Nostril, BID  oxygen, , inhalation, Continuous - Inhalation  polyethylene glycol, 17 g, oral, Daily  pravastatin, 80 mg, oral, Nightly  sennosides-docusate sodium, 2 tablet, oral, BID     [2] amiodarone, 0.5 mg/min, Last Rate: 0.5 mg/min (06/27/25 0653)     [3] PRN medications: oxyCODONE, oxyCODONE, oxygen

## 2025-06-27 NOTE — CONSULTS
[facility]  ACUTE CARE SURGERY - HISTORY AND PHYSICAL / CONSULT    Patient Name: Yosvany Chase  MRN: [id number]  Admit Date: 624  : 1950  AGE: 74 y.o.   GENDER: male  ==============================================================================  TODAY'S ASSESSMENT AND PLAN OF CARE:  73yo M with PMH HLD, GERD, spinal stenosis, L subclavian stenosis currently POD 3 from a CABG x 3, LIMA to LAD, SVG to PDA sequenced to OM, left atrial appendage ligation. Post-operative with concern for possible persistent pneumoperitoneum on daily CXR. Patient remains asymptomatic from an abdominal standpoint, he is tolerating a diet and passing gas. Final read on CXR with subdiaphragmatic lucency likely representing prominent colonic bowel loop. Low concern for intra-abdominal pathology based on imaging and examination.     Recommendations:  - further imaging per primary team  - if concern for intraabdominal pathology based on exam or imaging, please obtain CT AP with PO and IV contrast and reengage ACS  - remainder of management per primary    Patient discussed with Chief Resident Dr. Elijah Montes, PGY-4 and Attending Physician Dr. Small.    Eboni Hodgson MD PGY-2  Acute Care Surgery [id number]    ==============================================================================  CHIEF COMPLAINT/REASON FOR CONSULT:  73yo M with PMH HLD, GERD, spinal stenosis, L subclavian stenosis currently POD 3 from a CABG x 3, LIMA to LAD, SVG to PDA sequenced to OM, left atrial appendage ligation. Admitted to the cardiac surgery team. Post-operative course uncomplicated, diet advanced to regular diet which patient is tolerating. Occasional burping, which he states is his baseline. He is passing gas and having bowel movements. Denies any abdominal pain. Two chest tubes remain in place to suction with airleak. On daily CXR, concern for possible persistent pneumomediastinum on POD 3. ACS consulted for  additional recommendations.     PAST MEDICAL HISTORY:   PMH:  Medical History[1]  PSH:   Surgical History[2]  FH:   Family History[3]  SOCIAL HISTORY:    Smoking:    Tobacco Use History[4]    Alcohol:    Social History     Substance and Sexual Activity   Alcohol Use Never       Drug use: denies    MEDICATIONS:   Prior to Admission medications    Medication Sig Start Date End Date Taking? Authorizing Provider   aspirin 81 mg EC tablet Take 1 tablet (81 mg) by mouth once daily. 3/3/25 3/3/26 Yes Mirlande Low MD   cholecalciferol (Vitamin D-3) 50 mcg (2,000 unit) capsule Take 1 capsule (50 mcg) by mouth once daily. 2/9/18  Yes Historical Provider, MD   isosorbide mononitrate ER (Imdur) 30 mg 24 hr tablet Take 1 tablet (30 mg) by mouth once daily. Do not crush or chew. 5/23/25  Yes KRZYSZTOF Angel   multivitamin with minerals tablet Take 1 tablet by mouth once daily.   Yes Historical Provider, MD   pravastatin (Pravachol) 20 mg tablet Take 1 tablet (20 mg) by mouth once daily. 5/13/25 5/13/26 Yes KRZYSZTOF Angel   chlorhexidine (Peridex) 0.12 % solution Swish for 30 seconds and spit 15mL of solution the night before and morning of surgery 6/20/25 6/24/25 Yes Mary Villar PA-C     ALLERGIES:   RX Allergies[5]    REVIEW OF SYSTEMS:  A 10 point ROS was conducted, pertinent positives per HPI.   PHYSICAL EXAM:  Neurological: Awake, alert, NAD  Respiratory/Thorax: even, symmetric chest rise, no accessory muscle use, unlabored on NC, midline sternotomy incision well approximated, c/d/I, CT in place x 2 with serosan output, -20 sxn without air leak  GI: soft, non-distended, non-tender to palpation   : voiding  Skin: warm, dry  Musculoskeletal: RIVERA x 4  Extremities: no edema   Psychological: appropriate mood/affect    IMAGING SUMMARY:   XR chest 1 view   Final Result   1.  Low lung volumes with left basilar atelectasis. Small left   pleural effusion.   2. Similar right subdiaphragmatic  lucency, most likely representing   prominent colonic bowel loop.        I personally reviewed the images/study and I agree with the findings   as stated. This study was interpreted at [facility], [place], [place].        MACRO:   None        Signed by: Ari yDe 6/27/2025 10:34 AM   Dictation workstation:   [id number]      XR chest 1 view   Final Result   1. Similar right subdiaphragmatic lucency, which may represent   postoperative pneumoperitoneum or prominent colonic bowel loop.   Recommend correlation with recent surgical history.. If there is   persistent concern for perforated viscus, recommend further with   dedicated CT abdomen and pelvis.   2. Low lung volumes and bibasilar atelectasis. Suggestion of small   left pleural effusion.             I have reviewed the images/study and I agree with the findings as   stated by Dr. Ubaldo Muller.        Signed by: Ari Dye 6/26/2025 4:36 PM   Dictation workstation:   [id number]      XR chest 1 view   Final Result   1.  No gross evidence of pneumothorax status post extubation. Low   lung volumes and basilar atelectasis.   2. Right upper quadrant lucency may represent a small amount of   postoperative pneumoperitoneum. Correlate with any concern for   perforation.             Signed by: Jason Lucas 6/25/2025 12:45 PM   Dictation workstation:   [id number]      XR chest 1 view   Final Result   1. The previously visualized right apical curvilinear radiopacities   not seen and likely removed.   2. Presumed interval placement of enteric tube which courses midline   with tip overlying the gastric cardia. The enteric tube side port is   seen overlying the lower sucking. Recommend repositioning/advancement.   3. Remaining medical devices as above.             I have reviewed the images/study and I agree with the findings as   stated by Dr. Ubaldo Muller.        Signed by: Tiffanie Worthy 6/24/2025 4:09 PM   Dictation  workstation:   [id number]      XR femur right 1 view   Final Result   1.  Curvilinear metallic density measuring 1.8 cm overlying the apex   of the right lung, consistent with a needle. After discussing the   finding with the surgical team, finding was confirmed to represent an   overlying needle external to the patient. There is otherwise no other   evidence of retained surgical object.   2. Postsurgical changes of median sternotomy with multiple medical   devices as detailed above.   3. Low lung volumes with pulmonary vascular congestion.        I personally reviewed the images/study and resident's interpretation   and I agree with the findings as stated by Gladys Paredes MD (resident   radiologist). This study was analyzed and interpreted at [place], [place].        MACRO:   Gladys Paredes discussed the significance and urgency of this critical   finding by telephone with  OR  staff on 6/24/2025 at 2:19 pm.   (**-RCF-**) Findings:  See findings.             Signed by: Gilson Clifford 6/24/2025 2:53 PM   Dictation workstation:   [id number]      XR chest 1 view   Final Result   1.  Curvilinear metallic density measuring 1.8 cm overlying the apex   of the right lung, consistent with a needle. After discussing the   finding with the surgical team, finding was confirmed to represent an   overlying needle external to the patient. There is otherwise no other   evidence of retained surgical object.   2. Postsurgical changes of median sternotomy with multiple medical   devices as detailed above.   3. Low lung volumes with pulmonary vascular congestion.        I personally reviewed the images/study and resident's interpretation   and I agree with the findings as stated by Gladys Paredes MD (resident   radiologist). This study was analyzed and interpreted at [place], [place].        MACRO:   Gladys Paredes discussed the  significance and urgency of this critical   finding by telephone with  OR 19 staff on 6/24/2025 at 2:19 pm.   (**-RCF-**) Findings:  See findings.             Signed by: Gilson Clifford 6/24/2025 2:53 PM   Dictation workstation:   [id number]      Anesthesia Intraoperative Transesophageal Echocardiogram   Final Result      XR chest 1 view    (Results Pending)         LABS:  Results from last 7 days   Lab Units 06/27/25  0743 06/26/25  0702 06/25/25  0028 06/24/25  1441 06/20/25  1409   WBC AUTO x10*3/uL 11.4* 16.4* 13.8*   < > 6.6   HEMOGLOBIN g/dL 9.8* 10.1* 9.9*   < > 14.1   HEMATOCRIT % 29.6* 30.2* 31.2*   < > 43.4   PLATELETS AUTO x10*3/uL 143* 137* 137*   < > 199   NEUTROS PCT AUTO %  --   --   --   --  50.2   LYMPHS PCT AUTO %  --   --   --   --  36.9   MONOS PCT AUTO %  --   --   --   --  10.1   EOS PCT AUTO %  --   --   --   --  2.0    < > = values in this interval not displayed.     Results from last 7 days   Lab Units 06/24/25  1441 06/20/25  1409   APTT seconds 29 33   INR  1.4* 1.2*     Results from last 7 days   Lab Units 06/27/25  0743 06/26/25  0702 06/25/25  0028 06/24/25  1441 06/20/25  1409   SODIUM mmol/L 135* 134* 138   < > 137   POTASSIUM mmol/L 4.0 4.1 4.6   < > 4.4   CHLORIDE mmol/L 101 100 108*   < > 103   CO2 mmol/L 27 26 19*   < > 27   BUN mg/dL 23 21 21   < > 23   CREATININE mg/dL 0.67 0.71 1.13   < > 0.87   CALCIUM mg/dL 8.6 8.8 7.9*   < > 9.1   PROTEIN TOTAL g/dL  --   --   --   --  6.9   BILIRUBIN TOTAL mg/dL  --   --   --   --  0.6   ALK PHOS U/L  --   --   --   --  65   ALT U/L  --   --   --   --  15   AST U/L  --   --   --   --  18   GLUCOSE mg/dL 119* 131* 166*   < > 83    < > = values in this interval not displayed.     Results from last 7 days   Lab Units 06/20/25  1409   BILIRUBIN TOTAL mg/dL 0.6     Results from last 7 days   Lab Units 06/25/25  0449 06/25/25  0025 06/24/25  2242   POCT PH, ARTERIAL pH 7.42 7.32* 7.37*   POCT PCO2, ARTERIAL mm Hg 40 34* 32*   POCT PO2, ARTERIAL  mm Hg 59* 64* 73*   POCT HCO3 CALCULATED, ARTERIAL mmol/L 25.9 17.5* 18.5*   POCT BASE EXCESS, ARTERIAL mmol/L 1.3 -7.8* -5.9*         I have reviewed all laboratory and imaging results ordered/pertinent for this encounter.            [1]   Past Medical History:  Diagnosis Date    Agatston CAC score, >400 02/05/2025    Total 1408.02: LM 73.15   .9   LCx 791.52   .45    Atherosclerosis of native coronary artery of native heart, unspecified whether angina present     Plan: Coronary Artery Bypass Graft x 3 Vessels; LIMA; RADIAL; VEIN 6/24/25    Cardiology follow-up encounter     Omer Pringle MD LOV 4/23/25    Diverticulosis, sigmoid     H/O echocardiogram 05/28/2025    CONCLUSIONS:   1. Left ventricular ejection fraction is normal calculated by Noriega's biplane at 70%.   2. Spectral Doppler shows a Grade I (impaired relaxation pattern) of left ventricular diastolic filling with normal left atrial filling pressure.   3. Poorly visualized anatomical structures due to suboptimal image quality.   4. There is normal right ventricular global systolic function.    History of cardiovascular stress test 05/08/2025    1. Note is made of a partially fixed defect along the inferior wall.   Findings are concerning for prior infarct particularly along the basal aspect the inferior wall with moderate mleina-infarct ischemia along the mid to distal aspect.   2. The left ventricle is normal in size.   3. Decreased wall motion & thickening particularly along the basal to mid inferior wall with an LV EF estimated at 63%.    Hyperlipidemia     Obesity     S/P cardiac cath 05/23/2025    Vitamin D deficiency    [2]   Past Surgical History:  Procedure Laterality Date    CARDIAC CATHETERIZATION N/A 05/23/2025    Procedure: LHC, With LV;  Surgeon: Omer Pringle MD;  Location: [facility];  Service: Cardiovascular;  Laterality: N/A;    COLONOSCOPY      ROTATOR CUFF REPAIR Right    [3]   Family History  Problem  Relation Name Age of Onset    Atrial fibrillation Other      Prostate cancer Other     [4]   Social History  Tobacco Use   Smoking Status Never   Smokeless Tobacco Never   [5]   Allergies  Allergen Reactions    Crestor [Rosuvastatin] Myalgia    Vicodin [Hydrocodone-Acetaminophen] Headache and GI Upset

## 2025-06-27 NOTE — DISCHARGE INSTRUCTIONS
Don't forget to “Keep Your Move in the Tube!!”     Please refer to the “Move in the Tube” handout.  -- Load bearing activities can be completed if you are “staying in the tube.” If you are attempting load bearing activities, let pain be your guide with when trying an activity “out of the tube”. If an activity hurts or is uncomfortable go back to doing it while you stay “in the tube”. There is no time limit to “stay in the tube”.     -- Non-load bearing activities, which are your activities of daily living, can be completed with your arms “out of the tube” as long as you remain pain free. Some activities of daily living examples are dressing, personal care, showering, washing hair, and toilet hygiene.     Don't forget to KEEP YOUR MOVE IN THE TUBE and think of a T. Jose A dinosaur!        Additional Post-Operative Instructions:  - Remember to use your Incentive spirometer 10x/hr while awake. Remember to cough and deep breath.  - No NSAIDs (common over-the-counter NSAIDs are ibuprofen/Motrin/Advil, naproxen/Naprosyn/Aleve) for 3 months after cardiac surgery; if NSAIDs needed after 3 months, clear use with cardiologist before starting.       Your home medications may have changed after surgery. Carefully compare this list with your prescription bottles at home and set aside any medications you are told to not take so you do not confuse them. Do not dispose of any medications until your follow-up, since your doctors may restart some at your follow-up appointments. It is important to bring a complete, current list of your medications to any medical appointments or hospitalizations.    For any questions about your discharge, please call the cardiac surgery office at [phone]     questions about your discharge, please call the cardiac surgery office at [phone]

## 2025-06-27 NOTE — CARE PLAN
The patient's goals for the shift include  to rest     The clinical goals for the shift include pt will remain HDS throughout this shift      Problem: Safety - Adult  Goal: Free from fall injury  Outcome: Progressing     Problem: Discharge Planning  Goal: Discharge to home or other facility with appropriate resources  Outcome: Progressing     Problem: Chronic Conditions and Co-morbidities  Goal: Patient's chronic conditions and co-morbidity symptoms are monitored and maintained or improved  Outcome: Progressing     Problem: Nutrition  Goal: Nutrient intake appropriate for maintaining nutritional needs  Outcome: Progressing     Problem: Skin  Goal: Decreased wound size/increased tissue granulation at next dressing change  Outcome: Progressing  Goal: Participates in plan/prevention/treatment measures  Outcome: Progressing  Goal: Prevent/manage excess moisture  Outcome: Progressing  Goal: Prevent/minimize sheer/friction injuries  Outcome: Progressing  Goal: Promote/optimize nutrition  Outcome: Progressing  Goal: Promote skin healing  Outcome: Progressing     Problem: Fall/Injury  Goal: Not fall by end of shift  Outcome: Progressing  Goal: Be free from injury by end of the shift  Outcome: Progressing  Goal: Verbalize understanding of personal risk factors for fall in the hospital  Outcome: Progressing  Goal: Verbalize understanding of risk factor reduction measures to prevent injury from fall in the home  Outcome: Progressing  Goal: Use assistive devices by end of the shift  Outcome: Progressing  Goal: Pace activities to prevent fatigue by end of the shift  Outcome: Progressing     Problem: Knowledge Deficit  Goal: Patient/family/caregiver demonstrates understanding of disease process, treatment plan, medications, and discharge instructions  Outcome: Met     Problem: Mechanical Ventilation  Goal: Patient Will Maintain Patent Airway  Outcome: Met  Goal: Oral health is maintained or improved  Outcome: Met  Goal:  Tracheostomy will be managed safely  Outcome: Met  Goal: ET tube will be managed safely  Outcome: Met  Goal: Ability to express needs and understand communication  Outcome: Met  Goal: Mobility/activity is maintained at optimum level for patient  Outcome: Met

## 2025-06-27 NOTE — SIGNIFICANT EVENT
2 mediastinal chest tube removed without difficulty. Patient tolerated well.    Stat 1V CXR ordered.    Halley Fitzgerald, APRN-CNP, APRN-CNS  Cardiac Surgery [facility]  [facility]  Team Pager [phone]

## 2025-06-28 LAB
ALBUMIN SERPL BCP-MCNC: 3.5 G/DL (ref 3.4–5)
ANION GAP SERPL CALC-SCNC: 13 MMOL/L (ref 10–20)
BLOOD EXPIRATION DATE: NORMAL
BUN SERPL-MCNC: 20 MG/DL (ref 6–23)
CALCIUM SERPL-MCNC: 8.5 MG/DL (ref 8.6–10.6)
CHLORIDE SERPL-SCNC: 102 MMOL/L (ref 98–107)
CO2 SERPL-SCNC: 25 MMOL/L (ref 21–32)
CREAT SERPL-MCNC: 0.58 MG/DL (ref 0.5–1.3)
DISPENSE STATUS: NORMAL
EGFRCR SERPLBLD CKD-EPI 2021: >90 ML/MIN/1.73M*2
ERYTHROCYTE [DISTWIDTH] IN BLOOD BY AUTOMATED COUNT: 13.3 % (ref 11.5–14.5)
GLUCOSE BLD MANUAL STRIP-MCNC: 102 MG/DL (ref 74–99)
GLUCOSE BLD MANUAL STRIP-MCNC: 108 MG/DL (ref 74–99)
GLUCOSE BLD MANUAL STRIP-MCNC: 119 MG/DL (ref 74–99)
GLUCOSE SERPL-MCNC: 112 MG/DL (ref 74–99)
HCT VFR BLD AUTO: 30.6 % (ref 41–52)
HGB BLD-MCNC: 10 G/DL (ref 13.5–17.5)
MAGNESIUM SERPL-MCNC: 2.15 MG/DL (ref 1.6–2.4)
MCH RBC QN AUTO: 31.6 PG (ref 26–34)
MCHC RBC AUTO-ENTMCNC: 32.7 G/DL (ref 32–36)
MCV RBC AUTO: 97 FL (ref 80–100)
NRBC BLD-RTO: 0.2 /100 WBCS (ref 0–0)
PHOSPHATE SERPL-MCNC: 2.1 MG/DL (ref 2.5–4.9)
PLATELET # BLD AUTO: 174 X10*3/UL (ref 150–450)
POTASSIUM SERPL-SCNC: 3.8 MMOL/L (ref 3.5–5.3)
PRODUCT BLOOD TYPE: 6200
PRODUCT CODE: NORMAL
RBC # BLD AUTO: 3.16 X10*6/UL (ref 4.5–5.9)
SODIUM SERPL-SCNC: 136 MMOL/L (ref 136–145)
UNIT ABO: NORMAL
UNIT NUMBER: NORMAL
UNIT RH: NORMAL
UNIT VOLUME: 350
WBC # BLD AUTO: 9.1 X10*3/UL (ref 4.4–11.3)
XM INTEP: NORMAL

## 2025-06-28 PROCEDURE — 2500000002 HC RX 250 W HCPCS SELF ADMINISTERED DRUGS (ALT 637 FOR MEDICARE OP, ALT 636 FOR OP/ED)

## 2025-06-28 PROCEDURE — 82947 ASSAY GLUCOSE BLOOD QUANT: CPT

## 2025-06-28 PROCEDURE — 2500000001 HC RX 250 WO HCPCS SELF ADMINISTERED DRUGS (ALT 637 FOR MEDICARE OP): Performed by: STUDENT IN AN ORGANIZED HEALTH CARE EDUCATION/TRAINING PROGRAM

## 2025-06-28 PROCEDURE — 36415 COLL VENOUS BLD VENIPUNCTURE: CPT | Performed by: NURSE PRACTITIONER

## 2025-06-28 PROCEDURE — 85027 COMPLETE CBC AUTOMATED: CPT | Performed by: NURSE PRACTITIONER

## 2025-06-28 PROCEDURE — 2500000001 HC RX 250 WO HCPCS SELF ADMINISTERED DRUGS (ALT 637 FOR MEDICARE OP)

## 2025-06-28 PROCEDURE — 2500000001 HC RX 250 WO HCPCS SELF ADMINISTERED DRUGS (ALT 637 FOR MEDICARE OP): Performed by: NURSE PRACTITIONER

## 2025-06-28 PROCEDURE — 2500000004 HC RX 250 GENERAL PHARMACY W/ HCPCS (ALT 636 FOR OP/ED)

## 2025-06-28 PROCEDURE — 99232 SBSQ HOSP IP/OBS MODERATE 35: CPT

## 2025-06-28 PROCEDURE — 80069 RENAL FUNCTION PANEL: CPT | Performed by: NURSE PRACTITIONER

## 2025-06-28 PROCEDURE — 2500000004 HC RX 250 GENERAL PHARMACY W/ HCPCS (ALT 636 FOR OP/ED): Performed by: NURSE PRACTITIONER

## 2025-06-28 PROCEDURE — 1200000002 HC GENERAL ROOM WITH TELEMETRY DAILY

## 2025-06-28 PROCEDURE — 83735 ASSAY OF MAGNESIUM: CPT | Performed by: NURSE PRACTITIONER

## 2025-06-28 RX ORDER — SIMETHICONE 80 MG
80 TABLET,CHEWABLE ORAL 4 TIMES DAILY PRN
Status: DISPENSED | OUTPATIENT
Start: 2025-06-28

## 2025-06-28 RX ORDER — METOPROLOL TARTRATE 25 MG/1
12.5 TABLET, FILM COATED ORAL ONCE
Status: COMPLETED | OUTPATIENT
Start: 2025-06-28 | End: 2025-06-28

## 2025-06-28 RX ORDER — METOPROLOL TARTRATE 1 MG/ML
5 INJECTION, SOLUTION INTRAVENOUS EVERY 10 MIN PRN
Status: DISPENSED | OUTPATIENT
Start: 2025-06-28

## 2025-06-28 RX ORDER — FUROSEMIDE 40 MG/1
40 TABLET ORAL DAILY
Status: DISPENSED | OUTPATIENT
Start: 2025-06-28

## 2025-06-28 RX ORDER — AMIODARONE HYDROCHLORIDE 200 MG/1
400 TABLET ORAL 2 TIMES DAILY
Status: DISPENSED | OUTPATIENT
Start: 2025-06-28

## 2025-06-28 RX ORDER — POTASSIUM CHLORIDE 20 MEQ/1
20 TABLET, EXTENDED RELEASE ORAL ONCE
Status: COMPLETED | OUTPATIENT
Start: 2025-06-28 | End: 2025-06-28

## 2025-06-28 RX ADMIN — METOPROLOL TARTRATE 5 MG: 1 INJECTION, SOLUTION INTRAVENOUS at 16:15

## 2025-06-28 RX ADMIN — AMIODARONE HYDROCHLORIDE 0.5 MG/MIN: 1.8 INJECTION, SOLUTION INTRAVENOUS at 05:25

## 2025-06-28 RX ADMIN — HEPARIN SODIUM 5000 UNITS: 5000 INJECTION, SOLUTION INTRAVENOUS; SUBCUTANEOUS at 08:49

## 2025-06-28 RX ADMIN — HEPARIN SODIUM 5000 UNITS: 5000 INJECTION, SOLUTION INTRAVENOUS; SUBCUTANEOUS at 01:05

## 2025-06-28 RX ADMIN — SIMETHICONE 80 MG: 80 TABLET, CHEWABLE ORAL at 21:35

## 2025-06-28 RX ADMIN — AMIODARONE HYDROCHLORIDE 400 MG: 200 TABLET ORAL at 08:49

## 2025-06-28 RX ADMIN — METOPROLOL TARTRATE 5 MG: 1 INJECTION, SOLUTION INTRAVENOUS at 16:32

## 2025-06-28 RX ADMIN — HEPARIN SODIUM 5000 UNITS: 5000 INJECTION, SOLUTION INTRAVENOUS; SUBCUTANEOUS at 16:59

## 2025-06-28 RX ADMIN — METOPROLOL TARTRATE 12.5 MG: 25 TABLET, FILM COATED ORAL at 08:49

## 2025-06-28 RX ADMIN — PRAVASTATIN SODIUM 80 MG: 40 TABLET ORAL at 21:23

## 2025-06-28 RX ADMIN — METOPROLOL TARTRATE 12.5 MG: 25 TABLET, FILM COATED ORAL at 23:27

## 2025-06-28 RX ADMIN — ASPIRIN 81 MG: 81 TABLET, CHEWABLE ORAL at 08:49

## 2025-06-28 RX ADMIN — Medication 1 TABLET: at 08:49

## 2025-06-28 RX ADMIN — METOPROLOL TARTRATE 12.5 MG: 25 TABLET, FILM COATED ORAL at 21:23

## 2025-06-28 RX ADMIN — FUROSEMIDE 40 MG: 40 TABLET ORAL at 11:05

## 2025-06-28 RX ADMIN — AMIODARONE HYDROCHLORIDE 400 MG: 200 TABLET ORAL at 21:23

## 2025-06-28 RX ADMIN — POTASSIUM CHLORIDE 20 MEQ: 1500 TABLET, EXTENDED RELEASE ORAL at 16:59

## 2025-06-28 ASSESSMENT — COGNITIVE AND FUNCTIONAL STATUS - GENERAL
DAILY ACTIVITIY SCORE: 21
MOBILITY SCORE: 24
DRESSING REGULAR LOWER BODY CLOTHING: A LITTLE
HELP NEEDED FOR BATHING: A LITTLE
DRESSING REGULAR UPPER BODY CLOTHING: A LITTLE

## 2025-06-28 ASSESSMENT — PAIN - FUNCTIONAL ASSESSMENT
PAIN_FUNCTIONAL_ASSESSMENT: 0-10

## 2025-06-28 ASSESSMENT — PAIN SCALES - GENERAL
PAINLEVEL_OUTOF10: 0 - NO PAIN

## 2025-06-28 NOTE — PROGRESS NOTES
06/28/25 1131   Discharge Planning   Living Arrangements Spouse/significant other   Support Systems Spouse/significant other   Type of Residence Private residence   Home or Post Acute Services In home services   Type of Home Care Services Home nursing visits   Expected Discharge Disposition Home H  ([facility])   Does the patient need discharge transport arranged? No     Patient s/p cardiac surgery.  Discharge 2-3 days, pending completion of diagnostics, adequate diuresis and rhythm control.

## 2025-06-28 NOTE — CARE PLAN
The patient's goals for the shift include  To rest     The clinical goals for the shift include pt will remain HDS throughout this shift      Problem: Safety - Adult  Goal: Free from fall injury  Outcome: Progressing     Problem: Discharge Planning  Goal: Discharge to home or other facility with appropriate resources  Outcome: Progressing     Problem: Chronic Conditions and Co-morbidities  Goal: Patient's chronic conditions and co-morbidity symptoms are monitored and maintained or improved  Outcome: Progressing     Problem: Nutrition  Goal: Nutrient intake appropriate for maintaining nutritional needs  Outcome: Progressing     Problem: Skin  Goal: Decreased wound size/increased tissue granulation at next dressing change  Outcome: Progressing  Goal: Participates in plan/prevention/treatment measures  Outcome: Progressing  Goal: Prevent/manage excess moisture  Outcome: Progressing  Goal: Prevent/minimize sheer/friction injuries  Outcome: Progressing  Goal: Promote/optimize nutrition  Outcome: Progressing  Goal: Promote skin healing  Outcome: Progressing     Problem: Fall/Injury  Goal: Not fall by end of shift  Outcome: Progressing  Goal: Be free from injury by end of the shift  Outcome: Progressing  Goal: Verbalize understanding of personal risk factors for fall in the hospital  Outcome: Progressing  Goal: Verbalize understanding of risk factor reduction measures to prevent injury from fall in the home  Outcome: Progressing  Goal: Use assistive devices by end of the shift  Outcome: Progressing  Goal: Pace activities to prevent fatigue by end of the shift  Outcome: Progressing

## 2025-06-28 NOTE — PROGRESS NOTES
"CARDIAC SURGERY DAILY PROGRESS NOTE    Yosvany Chase is a 74 y.o. male, with a PMH of hyperlipidemia, GERD, lumbar spine stenosis, phlebitis of leg, dermatitis, left subclavian stenosis and elevated CAC score (1408 2/5/2025) followed by + Nuclear stress test.     6/24/25 OPERATION/PROCEDURE:   #1 EVH Right Saphenous Vein  #2 CABG x 3, LIMA to LAD 30ml/3.0 PI, SVG to PDA sequenced to OM 63 ml/3.3 PI  #3 Left Atrial Appendage Ligation w/ 45mm AtriClip with Mei Herr MD    CTICU Course: Uneventful    Transferred to T3 on 6/25/25    ==========================================    Interval History:   No acute events overnight  - converted back to NSR 6/27 around 1 am     SUBJECTIVE:  Pt sitting up in chair this AM. Overall feeling better. Reports well controlled pain. Denies SOB     Objective   /77   Pulse 69   Temp 37.2 °C (99 °F)   Resp 18   Ht 1.651 m (5' 5\")   Wt 93.4 kg (206 lb)   SpO2 95%   BMI 34.28 kg/m²   0-10 (Numeric) Pain Score: 0 - No pain   3 Day Weight Change: Unable to Calculate    Intake and Output    Intake/Output Summary (Last 24 hours) at 6/28/2025 1404  Last data filed at 6/28/2025 1157  Gross per 24 hour   Intake 1374.21 ml   Output 650 ml   Net 724.21 ml       Physical Exam  Vitals and nursing note reviewed.   Constitutional:       General: He is not in acute distress.  HENT:      Head: Normocephalic and atraumatic.      Mouth/Throat:      Mouth: Mucous membranes are moist.   Eyes:      Conjunctiva/sclera: Conjunctivae normal.   Cardiovascular:      Rate and Rhythm: Normal rate and regular rhythm.      Pulses: Normal pulses.      Heart sounds: Normal heart sounds.      Comments: TELE: SR HR 60s-70s  Wires capped   Pulmonary:      Effort: Pulmonary effort is normal.      Breath sounds: Normal breath sounds.      Comments: RA  Sternum stable  Abdominal:      General: Bowel sounds are normal.      Palpations: Abdomen is soft.      Comments: BM (+) 6/27     Genitourinary:     " Comments: Voiding independently  Musculoskeletal:      Cervical back: Normal range of motion and neck supple.      Right lower leg: Edema (trace) present.      Left lower leg: Edema (trace) present.      Comments: BRUNO Strong 5/5  Ambulates with a walker   Skin:     General: Skin is warm and dry.      Capillary Refill: Capillary refill takes less than 2 seconds.      Comments: midsternal chest incision C/D/I, well approximated, no s/s infection  right SVG incision C/D/I, well approximated, no s/s infection  Generalized bruising      Neurological:      General: No focal deficit present.      Mental Status: He is alert and oriented to person, place, and time.   Psychiatric:         Mood and Affect: Mood normal.         Speech: Speech normal.         Behavior: Behavior normal.         Cognition and Memory: Cognition normal.       Medications  Scheduled medications  Scheduled Medications[1]Continuous medications  Continuous Medications[2]PRN medications  PRN Medications[3]    Labs  Results for orders placed or performed during the hospital encounter of 06/24/25 (from the past 24 hours)   POCT GLUCOSE   Result Value Ref Range    POCT Glucose 96 74 - 99 mg/dL   Magnesium   Result Value Ref Range    Magnesium 2.15 1.60 - 2.40 mg/dL   CBC   Result Value Ref Range    WBC 9.1 4.4 - 11.3 x10*3/uL    nRBC 0.2 (H) 0.0 - 0.0 /100 WBCs    RBC 3.16 (L) 4.50 - 5.90 x10*6/uL    Hemoglobin 10.0 (L) 13.5 - 17.5 g/dL    Hematocrit 30.6 (L) 41.0 - 52.0 %    MCV 97 80 - 100 fL    MCH 31.6 26.0 - 34.0 pg    MCHC 32.7 32.0 - 36.0 g/dL    RDW 13.3 11.5 - 14.5 %    Platelets 174 150 - 450 x10*3/uL   Renal Function Panel   Result Value Ref Range    Glucose 112 (H) 74 - 99 mg/dL    Sodium 136 136 - 145 mmol/L    Potassium 3.8 3.5 - 5.3 mmol/L    Chloride 102 98 - 107 mmol/L    Bicarbonate 25 21 - 32 mmol/L    Anion Gap 13 10 - 20 mmol/L    Urea Nitrogen 20 6 - 23 mg/dL    Creatinine 0.58 0.50 - 1.30 mg/dL    eGFR >90 >60 mL/min/1.73m*2     Calcium 8.5 (L) 8.6 - 10.6 mg/dL    Phosphorus 2.1 (L) 2.5 - 4.9 mg/dL    Albumin 3.5 3.4 - 5.0 g/dL   POCT GLUCOSE   Result Value Ref Range    POCT Glucose 102 (H) 74 - 99 mg/dL   POCT GLUCOSE   Result Value Ref Range    POCT Glucose 108 (H) 74 - 99 mg/dL         IMAGING/ DIAGNOSTIC TESTING:  I have personally reviewed the following test result(s):   XR chest 1 view 06/27/2025  Impression  1. Stable appearance of a trace right apical pneumothorax.  2. Streaky left basilar opacities, likely atelectasis.    XR chest 1 view 06/25/2025  Impression  1.  No gross evidence of pneumothorax status post extubation. Low  lung volumes and basilar atelectasis.  2. Right upper quadrant lucency may represent a small amount of  postoperative pneumoperitoneum. Correlate with any concern for  perforation.  Signed by: Jason Lucas 6/25/2025 12:45 PM     ================  IMPRESSION & PLAN:  ===============  POD # 4 s/p EVH Right Saphenous Vein, CABG x 3, LIMA to LAD 30ml/3.0 PI, SVG to PDA sequenced to OM 63 ml/3.3 PI, Left Atrial Appendage Ligation w/ 45mm AtriClip with Mei Herr MD  - Increase activity/ ambulation; PT/OT  - Encourage IS, C/DB; respiratory therapy; wean O2 as teddy   - Cardiac rehab referral   - Continue cardiac meds: ASA, BB, statin   - Pain and anticonstipation meds  - 2 mediastinal in place to -20cm suction; - Maintain chest tubes until output down/removal ok with surgeon; monitor daily 1v PCXR while chest tubes in place; 6/27 Chest tubes removed   - 2v CXR ordered for 6/29   - Postop echo N/A  - Remove epicardial wires prior to discharge   - Tele until discharge  - Optimize nutrition and electrolytes  - 6/26 Noted pneumoperitoneum on XR since post op; consulted with fellow and felt this was likely operative in nature; sent secure chat to surgeon  - 6/27 Discussed with Dr. Herr; decided to do a general surgery consult;  General surgery recs:  Final read on CXR with subdiaphragmatic lucency likely  representing prominent colonic bowel loop. Low concern for intra-abdominal pathology based on imaging and examination.   Recommendations:  - further imaging per primary team  - if concern for intraabdominal pathology based on exam or imaging, please obtain CT AP with PO and IV contrast and reengage ACS  - remainder of management per primary    - 6/28: right subdiaphragmatic lucency no longer seen on CXR.  D/t improvement on CXR and unremarkable abdominal exam. Will hold off on further imaging. Dr. Herr aware and ok with plan     Rhythm  - Tele: SR, HR 60s-70s   - Continue BB  - Adjust medications as tolerated  - 6/26 AF with RVR with rates in 160 bpm; amio bolus and infusion ordered following metoprolol 5 mg IVP x3. Rates beginning to decrease. One BP of 86 sys necessitated a 250 mL NS bolus  x1 with good response. Finally converted early in am with amio bolus and infusion   - 6/27 Consider po amio tomorrow  - 6/28: Transitioned to 400 mg PO Amio BID     Acute Blood Loss Anemia   Recent Labs     06/28/25  0642 06/27/25  0743 06/26/25  0702 06/25/25  0028 06/24/25  1441 06/20/25  1409 05/23/25  0718   HGB 10.0* 9.8* 10.1* 9.9* 12.3* 14.1 15.7   HCT 30.6* 29.6* 30.2* 31.2* 38.7* 43.4 45.5   - MV, PO Iron x1mo  - Daily labs, transfuse as indicated  - 6/26 No iron, MVI initiated    Thrombocytopenia  Recent Labs     06/28/25  0642 06/27/25  0743 06/26/25  0702 06/25/25  0028 06/24/25  1441 06/20/25  1409 05/23/25  0718    143* 137* 137* 155 199 196   - Etiology likely postop/CPB related  - Continue to trend with daily CBCs    Volume/Electrolyte Status: Preop wt Weight: 91.4 kg (201 lb 8 oz)   Vitals:    06/28/25 0600   Weight: 93.4 kg (206 lb)   Baseline Cr 0.87  - Weight: 93.4 kg, 94.4, 96.3  - Adjust diuresis as needed for postop cardiac surgery hypervolemia  - Replete electrolytes for hypokalemia / hypomagnesemia / hypophosphatemia as needed; 6/28: replaced K   - Daily weights and strict I&Os  - Daily RFP while  admitted  - : started Lasix 40 mg PO     Hyperlipidemia: Home Meds: Pravastatin 20 mg daily  Lab Results   Component Value Date    CHOL 237 (H) 10/07/2024    HDL 45.0 10/07/2024    VLDL 31 10/07/2024    TRIG 155 (H) 10/07/2024    NHDL 192 (H) 10/07/2024   - Continue Pravastatin  - Follow up lipid panel with PCP/ cardiologist for ongoing lipid management    Leukocytosis  Recent Labs     25  0642 25  0743 25  0702 25  0028 25  1441 25  1409 25  0718   WBC 9.1 11.4* 16.4* 13.8* 21.9* 6.6 6.2     Temp (36hrs), Av.2 °C (98.9 °F), Min:36.9 °C (98.4 °F), Max:37.5 °C (99.5 °F)     - Aggressive pulmonary hygiene  - Monitor for s/s infection  - Likely atelectasis/ postoperative in etiology  - Daily CBC to follow        VTE Prophylaxis: SCDs/TEDs, ambulation, SQ heparin  Code Status: Full Code    Dispo  - PT/OT recs Low Intensity Level of Care; Mobility Scoare: 18; ok for discharge  - Would benefit from homecare for cardiac surgery carepath and RN visits  - Anticipate discharge 2-3 days, pending completion of diagnostics, adequate diuresis, and rhythm control.   - Will continue to assess discharge needs      KAREEM Laguerre-CNP  Cardiac Surgery [facility]  [facility]  Team Phone [phone]    2025  2:04 PM             [1] acetaminophen, 975 mg, oral, q6h  amiodarone, 400 mg, oral, BID  aspirin, 81 mg, oral, Daily  furosemide, 40 mg, oral, Daily  heparin, 5,000 Units, subcutaneous, q8h  insulin lispro, 0-5 Units, subcutaneous, TID AC  metoprolol tartrate, 12.5 mg, oral, BID  multivitamin with minerals iron-free, 1 tablet, oral, Daily  mupirocin, , Each Nostril, BID  oxygen, , inhalation, Continuous - Inhalation  polyethylene glycol, 17 g, oral, Daily  pravastatin, 80 mg, oral, Nightly  sennosides-docusate sodium, 2 tablet, oral, BID     [2]    [3] PRN medications: oxyCODONE, oxygen, simethicone

## 2025-06-29 ENCOUNTER — APPOINTMENT (OUTPATIENT)
Dept: RADIOLOGY | Facility: HOSPITAL | Age: 75
DRG: 303 | End: 2025-06-29
Payer: MEDICARE

## 2025-06-29 VITALS
HEART RATE: 72 BPM | BODY MASS INDEX: 33.72 KG/M2 | WEIGHT: 202.4 LBS | TEMPERATURE: 98.2 F | OXYGEN SATURATION: 97 % | SYSTOLIC BLOOD PRESSURE: 128 MMHG | RESPIRATION RATE: 18 BRPM | HEIGHT: 65 IN | DIASTOLIC BLOOD PRESSURE: 64 MMHG

## 2025-06-29 LAB
ALBUMIN SERPL BCP-MCNC: 3.4 G/DL (ref 3.4–5)
ANION GAP SERPL CALC-SCNC: 15 MMOL/L (ref 10–20)
BUN SERPL-MCNC: 19 MG/DL (ref 6–23)
CALCIUM SERPL-MCNC: 8.9 MG/DL (ref 8.6–10.6)
CHLORIDE SERPL-SCNC: 101 MMOL/L (ref 98–107)
CO2 SERPL-SCNC: 25 MMOL/L (ref 21–32)
CREAT SERPL-MCNC: 0.77 MG/DL (ref 0.5–1.3)
EGFRCR SERPLBLD CKD-EPI 2021: >90 ML/MIN/1.73M*2
ERYTHROCYTE [DISTWIDTH] IN BLOOD BY AUTOMATED COUNT: 13.3 % (ref 11.5–14.5)
GLUCOSE BLD MANUAL STRIP-MCNC: 97 MG/DL (ref 74–99)
GLUCOSE SERPL-MCNC: 136 MG/DL (ref 74–99)
HCT VFR BLD AUTO: 32.2 % (ref 41–52)
HGB BLD-MCNC: 10.6 G/DL (ref 13.5–17.5)
MAGNESIUM SERPL-MCNC: 2.09 MG/DL (ref 1.6–2.4)
MCH RBC QN AUTO: 31.2 PG (ref 26–34)
MCHC RBC AUTO-ENTMCNC: 32.9 G/DL (ref 32–36)
MCV RBC AUTO: 95 FL (ref 80–100)
NRBC BLD-RTO: 0.5 /100 WBCS (ref 0–0)
PHOSPHATE SERPL-MCNC: 2.3 MG/DL (ref 2.5–4.9)
PLATELET # BLD AUTO: 236 X10*3/UL (ref 150–450)
POTASSIUM SERPL-SCNC: 3.5 MMOL/L (ref 3.5–5.3)
RBC # BLD AUTO: 3.4 X10*6/UL (ref 4.5–5.9)
SODIUM SERPL-SCNC: 137 MMOL/L (ref 136–145)
WBC # BLD AUTO: 7.8 X10*3/UL (ref 4.4–11.3)

## 2025-06-29 PROCEDURE — 1200000002 HC GENERAL ROOM WITH TELEMETRY DAILY

## 2025-06-29 PROCEDURE — 2500000001 HC RX 250 WO HCPCS SELF ADMINISTERED DRUGS (ALT 637 FOR MEDICARE OP): Performed by: NURSE PRACTITIONER

## 2025-06-29 PROCEDURE — 85027 COMPLETE CBC AUTOMATED: CPT | Performed by: NURSE PRACTITIONER

## 2025-06-29 PROCEDURE — 80069 RENAL FUNCTION PANEL: CPT | Performed by: NURSE PRACTITIONER

## 2025-06-29 PROCEDURE — 83735 ASSAY OF MAGNESIUM: CPT | Performed by: NURSE PRACTITIONER

## 2025-06-29 PROCEDURE — 99232 SBSQ HOSP IP/OBS MODERATE 35: CPT

## 2025-06-29 PROCEDURE — 82947 ASSAY GLUCOSE BLOOD QUANT: CPT

## 2025-06-29 PROCEDURE — 36415 COLL VENOUS BLD VENIPUNCTURE: CPT | Performed by: NURSE PRACTITIONER

## 2025-06-29 PROCEDURE — 71046 X-RAY EXAM CHEST 2 VIEWS: CPT

## 2025-06-29 PROCEDURE — 2500000001 HC RX 250 WO HCPCS SELF ADMINISTERED DRUGS (ALT 637 FOR MEDICARE OP)

## 2025-06-29 PROCEDURE — 2500000002 HC RX 250 W HCPCS SELF ADMINISTERED DRUGS (ALT 637 FOR MEDICARE OP, ALT 636 FOR OP/ED)

## 2025-06-29 PROCEDURE — 2500000004 HC RX 250 GENERAL PHARMACY W/ HCPCS (ALT 636 FOR OP/ED): Performed by: NURSE PRACTITIONER

## 2025-06-29 PROCEDURE — 71046 X-RAY EXAM CHEST 2 VIEWS: CPT | Performed by: RADIOLOGY

## 2025-06-29 RX ORDER — POTASSIUM CHLORIDE 20 MEQ/1
40 TABLET, EXTENDED RELEASE ORAL ONCE
Status: COMPLETED | OUTPATIENT
Start: 2025-06-29 | End: 2025-06-29

## 2025-06-29 RX ORDER — METOPROLOL TARTRATE 25 MG/1
25 TABLET, FILM COATED ORAL 2 TIMES DAILY
Status: DISPENSED | OUTPATIENT
Start: 2025-06-29

## 2025-06-29 RX ORDER — CALCIUM CARBONATE 200(500)MG
500 TABLET,CHEWABLE ORAL 4 TIMES DAILY PRN
Status: DISPENSED | OUTPATIENT
Start: 2025-06-29

## 2025-06-29 RX ADMIN — HEPARIN SODIUM 5000 UNITS: 5000 INJECTION, SOLUTION INTRAVENOUS; SUBCUTANEOUS at 00:40

## 2025-06-29 RX ADMIN — FUROSEMIDE 40 MG: 40 TABLET ORAL at 09:42

## 2025-06-29 RX ADMIN — HEPARIN SODIUM 5000 UNITS: 5000 INJECTION, SOLUTION INTRAVENOUS; SUBCUTANEOUS at 17:30

## 2025-06-29 RX ADMIN — AMIODARONE HYDROCHLORIDE 400 MG: 200 TABLET ORAL at 09:42

## 2025-06-29 RX ADMIN — ANTACID TABLETS 1 TABLET: 500 TABLET, CHEWABLE ORAL at 15:55

## 2025-06-29 RX ADMIN — METOPROLOL TARTRATE 12.5 MG: 25 TABLET, FILM COATED ORAL at 09:42

## 2025-06-29 RX ADMIN — ANTACID TABLETS 1 TABLET: 500 TABLET, CHEWABLE ORAL at 15:20

## 2025-06-29 RX ADMIN — POTASSIUM CHLORIDE 40 MEQ: 1500 TABLET, EXTENDED RELEASE ORAL at 13:40

## 2025-06-29 RX ADMIN — HEPARIN SODIUM 5000 UNITS: 5000 INJECTION, SOLUTION INTRAVENOUS; SUBCUTANEOUS at 09:42

## 2025-06-29 RX ADMIN — METOPROLOL TARTRATE 25 MG: 25 TABLET, FILM COATED ORAL at 20:08

## 2025-06-29 RX ADMIN — AMIODARONE HYDROCHLORIDE 400 MG: 200 TABLET ORAL at 20:08

## 2025-06-29 RX ADMIN — PRAVASTATIN SODIUM 80 MG: 40 TABLET ORAL at 20:08

## 2025-06-29 RX ADMIN — ASPIRIN 81 MG: 81 TABLET, CHEWABLE ORAL at 09:42

## 2025-06-29 RX ADMIN — Medication 1 TABLET: at 09:42

## 2025-06-29 ASSESSMENT — COGNITIVE AND FUNCTIONAL STATUS - GENERAL
DAILY ACTIVITIY SCORE: 21
MOBILITY SCORE: 24
HELP NEEDED FOR BATHING: A LITTLE
DRESSING REGULAR LOWER BODY CLOTHING: A LITTLE
DRESSING REGULAR UPPER BODY CLOTHING: A LITTLE

## 2025-06-29 ASSESSMENT — PAIN SCALES - GENERAL
PAINLEVEL_OUTOF10: 0 - NO PAIN
PAINLEVEL_OUTOF10: 0 - NO PAIN

## 2025-06-29 ASSESSMENT — PAIN - FUNCTIONAL ASSESSMENT
PAIN_FUNCTIONAL_ASSESSMENT: 0-10
PAIN_FUNCTIONAL_ASSESSMENT: 0-10

## 2025-06-29 NOTE — PROGRESS NOTES
"CARDIAC SURGERY DAILY PROGRESS NOTE    Yosvany Chase is a 74 y.o. male, with a PMH of hyperlipidemia, GERD, lumbar spine stenosis, phlebitis of leg, dermatitis, left subclavian stenosis and elevated CAC score (1408 2/5/2025) followed by + Nuclear stress test.     6/24/25 OPERATION/PROCEDURE:   #1 EVH Right Saphenous Vein  #2 CABG x 3, LIMA to LAD 30ml/3.0 PI, SVG to PDA sequenced to OM 63 ml/3.3 PI  #3 Left Atrial Appendage Ligation w/ 45mm AtriClip with Mei Herr MD    CTICU Course: Uneventful    Transferred to T3 on 6/25/25    ==========================================    Interval History:   No acute events overnight  - converted back to NSR 6/27 around 1 am     SUBJECTIVE:  Pt sitting up in chair this AM. Overall feeling better. Reports well controlled pain. Denies SOB     Objective   /71   Pulse 74   Temp 36.8 °C (98.2 °F)   Resp 18   Ht 1.651 m (5' 5\")   Wt 91.8 kg (202 lb 6.4 oz)   SpO2 95%   BMI 33.68 kg/m²   0-10 (Numeric) Pain Score: 0 - No pain   3 Day Weight Change: -1.754 kg (-3 lb 13.9 oz) per day    Intake and Output    Intake/Output Summary (Last 24 hours) at 6/29/2025 1028  Last data filed at 6/29/2025 0831  Gross per 24 hour   Intake 1080 ml   Output 0 ml   Net 1080 ml       Physical Exam  Vitals and nursing note reviewed.   Constitutional:       General: He is not in acute distress.  HENT:      Head: Normocephalic and atraumatic.      Mouth/Throat:      Mouth: Mucous membranes are moist.   Eyes:      Conjunctiva/sclera: Conjunctivae normal.   Cardiovascular:      Rate and Rhythm: Normal rate and regular rhythm.      Pulses: Normal pulses.      Heart sounds: Normal heart sounds.      Comments: TELE: SR HR 60s-70s  2 brief episodes of AF RVR yesterday   Wires capped   Pulmonary:      Effort: Pulmonary effort is normal.      Breath sounds: Normal breath sounds.      Comments: RA  Sternum stable  Abdominal:      General: Bowel sounds are normal.      Palpations: Abdomen is soft.    "   Comments: BM (+) 6/27     Genitourinary:     Comments: Voiding independently  Musculoskeletal:      Cervical back: Normal range of motion and neck supple.      Right lower leg: Edema (trace) present.      Left lower leg: Edema (trace) present.      Comments: BRUNO, Strong 5/5  Ambulates with a walker   Skin:     General: Skin is warm and dry.      Capillary Refill: Capillary refill takes less than 2 seconds.      Comments: midsternal chest incision C/D/I, well approximated, no s/s infection  right SVG incision C/D/I, well approximated, no s/s infection  Generalized bruising on chest and legs      Neurological:      General: No focal deficit present.      Mental Status: He is alert and oriented to person, place, and time.   Psychiatric:         Mood and Affect: Mood normal.         Speech: Speech normal.         Behavior: Behavior normal. Behavior is cooperative.         Cognition and Memory: Cognition normal.       Medications  Scheduled medications  Scheduled Medications[1]Continuous medications  Continuous Medications[2]PRN medications  PRN Medications[3]    Labs  Results for orders placed or performed during the hospital encounter of 06/24/25 (from the past 24 hours)   POCT GLUCOSE   Result Value Ref Range    POCT Glucose 108 (H) 74 - 99 mg/dL   POCT GLUCOSE   Result Value Ref Range    POCT Glucose 119 (H) 74 - 99 mg/dL   CBC   Result Value Ref Range    WBC 7.8 4.4 - 11.3 x10*3/uL    nRBC 0.5 (H) 0.0 - 0.0 /100 WBCs    RBC 3.40 (L) 4.50 - 5.90 x10*6/uL    Hemoglobin 10.6 (L) 13.5 - 17.5 g/dL    Hematocrit 32.2 (L) 41.0 - 52.0 %    MCV 95 80 - 100 fL    MCH 31.2 26.0 - 34.0 pg    MCHC 32.9 32.0 - 36.0 g/dL    RDW 13.3 11.5 - 14.5 %    Platelets 236 150 - 450 x10*3/uL         IMAGING/ DIAGNOSTIC TESTING:  I have personally reviewed the following test result(s):     XR chest 1 view 06/27/2025  Impression  1. Stable appearance of a trace right apical pneumothorax.  2. Streaky left basilar opacities, likely  atelectasis.    XR chest 1 view 06/25/2025  Impression  1.  No gross evidence of pneumothorax status post extubation. Low  lung volumes and basilar atelectasis.  2. Right upper quadrant lucency may represent a small amount of  postoperative pneumoperitoneum. Correlate with any concern for  perforation.  Signed by: Jason Lucas 6/25/2025 12:45 PM     ================  IMPRESSION & PLAN:  ===============  POD # 5 s/p EVH Right Saphenous Vein, CABG x 3, LIMA to LAD 30ml/3.0 PI, SVG to PDA sequenced to OM 63 ml/3.3 PI, Left Atrial Appendage Ligation w/ 45mm AtriClip with Mei Herr MD  - Increase activity/ ambulation; PT/OT  - Encourage IS, C/DB; respiratory therapy; wean O2 as teddy   - Cardiac rehab referral   - Continue cardiac meds: ASA, BB, statin   - Pain and anticonstipation meds  - 2 mediastinal in place to -20cm suction; - Maintain chest tubes until output down/removal ok with surgeon; monitor daily 1v PCXR while chest tubes in place; 6/27 Chest tubes removed   - 2v CXR done 6/29   - Postop echo N/A  - Remove epicardial wires prior to discharge   - Tele until discharge  - Optimize nutrition and electrolytes  - 6/26 Noted pneumoperitoneum on XR since post op; consulted with fellow and felt this was likely operative in nature; sent secure chat to surgeon  - 6/27 Discussed with Dr. Herr; decided to do a general surgery consult;  General surgery recs:  Final read on CXR with subdiaphragmatic lucency likely representing prominent colonic bowel loop. Low concern for intra-abdominal pathology based on imaging and examination.   Recommendations:  - further imaging per primary team  - if concern for intraabdominal pathology based on exam or imaging, please obtain CT AP with PO and IV contrast and reengage ACS  - remainder of management per primary    - 6/28: right subdiaphragmatic lucency no longer seen on CXR.  D/t improvement on CXR and unremarkable abdominal exam. Will hold off on further imaging. Dr. Herr aware  and ok with plan       Rhythm  - Tele: SR, HR 60s-70s (2 brief episodes of AF RVR yesterday that resolved with metoprolol)   - Continue BB  - Adjust medications as tolerated  - 6/26 AF with RVR with rates in 160 bpm; amio bolus and infusion ordered following metoprolol 5 mg IVP x3. Rates beginning to decrease. One BP of 86 sys necessitated a 250 mL NS bolus  x1 with good response. Finally converted early in am with amio bolus and infusion   - 6/27 Consider po amio tomorrow  - 6/28: Transitioned to 400 mg PO Amio BID   - 6/29: increased Metoprolol to 25 mg BID     Acute Blood Loss Anemia   Recent Labs     06/29/25  0846 06/28/25  0642 06/27/25  0743 06/26/25  0702 06/25/25  0028 06/24/25  1441 06/20/25  1409   HGB 10.6* 10.0* 9.8* 10.1* 9.9* 12.3* 14.1   HCT 32.2* 30.6* 29.6* 30.2* 31.2* 38.7* 43.4   - MV, PO Iron x1mo  - Daily labs, transfuse as indicated  - 6/26 No iron, MVI initiated      Thrombocytopenia  Recent Labs     06/29/25  0846 06/28/25  0642 06/27/25  0743 06/26/25  0702 06/25/25  0028 06/24/25  1441 06/20/25  1409    174 143* 137* 137* 155 199   - Etiology likely postop/CPB related  - Continue to trend with daily CBCs    Volume/Electrolyte Status: Preop wt Weight: 91.4 kg (201 lb 8 oz)   Vitals:    06/29/25 0700   Weight: 91.8 kg (202 lb 6.4 oz)   Baseline Cr 0.87  - Weight: 91.8 kg, 93.4 kg, 94.4, 96.3  - Adjust diuresis as needed for postop cardiac surgery hypervolemia  - Replete electrolytes for hypokalemia / hypomagnesemia / hypophosphatemia as needed; 6/29: replaced K   - Daily weights and strict I&Os  - Daily RFP while admitted  - 6/28: started Lasix 40 mg PO     Hyperlipidemia: Home Meds: Pravastatin 20 mg daily  Lab Results   Component Value Date    CHOL 237 (H) 10/07/2024    HDL 45.0 10/07/2024    VLDL 31 10/07/2024    TRIG 155 (H) 10/07/2024    NHDL 192 (H) 10/07/2024   - Continue Pravastatin  - Follow up lipid panel with PCP/ cardiologist for ongoing lipid  management    Leukocytosis  Recent Labs     25  0846 25  0642 25  0743 25  0702 25  0028 25  1441 25  1409   WBC 7.8 9.1 11.4* 16.4* 13.8* 21.9* 6.6     Temp (36hrs), Av.8 °C (98.3 °F), Min:36.2 °C (97.2 °F), Max:37.6 °C (99.7 °F)     - Aggressive pulmonary hygiene  - Monitor for s/s infection  - Likely atelectasis/ postoperative in etiology  - Daily CBC to follow        VTE Prophylaxis: SCDs/TEDs, ambulation, SQ heparin  Code Status: Full Code    Dispo  - PT/OT recs Low Intensity Level of Care; Mobility Scoare: 18; ok for discharge  - Would benefit from homecare for cardiac surgery carepath and RN visits  - Anticipate discharge 1-2 days pending completion of diagnostics, and rhythm control.   - Will continue to assess discharge needs      KAREEM Laguerre-CNP  Cardiac Surgery [facility]  [facility]  Team Phone [phone]    2025  10:28 AM               [1] acetaminophen, 975 mg, oral, q6h  amiodarone, 400 mg, oral, BID  aspirin, 81 mg, oral, Daily  furosemide, 40 mg, oral, Daily  heparin, 5,000 Units, subcutaneous, q8h  metoprolol tartrate, 12.5 mg, oral, BID  multivitamin with minerals iron-free, 1 tablet, oral, Daily  polyethylene glycol, 17 g, oral, Daily  pravastatin, 80 mg, oral, Nightly  sennosides-docusate sodium, 2 tablet, oral, BID     [2]    [3] PRN medications: metoprolol, oxyCODONE, oxygen, simethicone

## 2025-06-29 NOTE — HOSPITAL COURSE
Yosvany Chase is a 74 y.o. male, with a PMH of hyperlipidemia, GERD, lumbar spine stenosis, phlebitis of leg, dermatitis, left subclavian stenosis and elevated CAC score (1408 2/5/2025) followed by + Nuclear stress test.      6/24/25 OPERATION/PROCEDURE:   #1 EVH Right Saphenous Vein  #2 CABG x 3, LIMA to LAD 30ml/3.0 PI, SVG to PDA sequenced to OM 63 ml/3.3 PI  #3 Left Atrial Appendage Ligation w/ 45mm AtriClip with Mei Herr MD     CTICU Course: Uneventful     Transferred to T3 on 6/25/25      Floor Course:  - Patient was diuresed for fluid volume overload post cardiac surgery; Preop weight: ***kg, discharge wt: ***kg  - On ASA, statin, BB, *** by discharge  - Epicardial wires {cut / pull epicardial wires:14891} on ***  - Telemetry at discharge ***  - 2v CXR done 6/29/25  - Cardiac rehab referral was placed  - PT recs low intensity therapy  - Anticipate discharge to {discharge disposition:99502}    Discharged on ***      On day of discharge, vital signs were stable and no acute distress was noted. All questions were answered. After VS and labs were reviewed it was determined the patient was stable for discharge.   Hospital day of discharge management- spent >30 minutes coordinating the discharge and counseling/educating patient and family regarding discharge instructions.  ==============================================    Past Medical History       Diagnosis Date    Agatston CAC score, >400 02/05/2025     Total 1408.02: LM 73.15   .9   LCx 791.52   .45    Atherosclerosis of native coronary artery of native heart, unspecified whether angina present       Plan: Coronary Artery Bypass Graft x 3 Vessels; LIMA; RADIAL; VEIN 6/24/25    Cardiology follow-up encounter       Omer Pringle MD LOV 4/23/25    Diverticulosis, sigmoid      H/O echocardiogram 05/28/2025     CONCLUSIONS:   1. Left ventricular ejection fraction is normal calculated by Noriega's biplane at 70%.   2. Spectral Doppler shows  a Grade I (impaired relaxation pattern) of left ventricular diastolic filling with normal left atrial filling pressure.   3. Poorly visualized anatomical structures due to suboptimal image quality.   4. There is normal right ventricular global systolic function.    History of cardiovascular stress test 05/08/2025     1. Note is made of a partially fixed defect along the inferior wall.   Findings are concerning for prior infarct particularly along the basal aspect the inferior wall with moderate melina-infarct ischemia along the mid to distal aspect.   2. The left ventricle is normal in size.   3. Decreased wall motion & thickening particularly along the basal to mid inferior wall with an LV EF estimated at 63%.    Hyperlipidemia      Obesity      S/P cardiac cath 05/23/2025    Vitamin D deficiency       [Surgical History]    [Surgical History]  Past Surgical History        Procedure Laterality Date    CARDIAC CATHETERIZATION N/A 05/23/2025     Procedure: LHC, With LV;  Surgeon: Omer Pringle MD;  Location: [facility] Cardiac Cath Lab;  Service: Cardiovascular;  Laterality: N/A;    COLONOSCOPY        ROTATOR CUFF REPAIR Right           Prescriptions Prior to Admission          Medications Prior to Admission   Medication Sig Dispense Refill Last Dose/Taking    aspirin 81 mg EC tablet Take 1 tablet (81 mg) by mouth once daily. 30 tablet 11 6/24/2025 Morning    chlorhexidine (Peridex) 0.12 % solution Swish for 30 seconds and spit 15mL of solution the night before and morning of surgery 475 mL 0 6/24/2025 Morning    cholecalciferol (Vitamin D-3) 50 mcg (2,000 unit) capsule Take 1 capsule (50 mcg) by mouth once daily.     Past Week    isosorbide mononitrate ER (Imdur) 30 mg 24 hr tablet Take 1 tablet (30 mg) by mouth once daily. Do not crush or chew. 90 tablet 1 6/23/2025    multivitamin with minerals tablet Take 1 tablet by mouth once daily.     Past Week    pravastatin (Pravachol) 20 mg tablet Take 1 tablet (20 mg) by mouth  once daily. 30 tablet 11 6/24/2025 Morning     Allergies: Crestor [rosuvastatin] and Vicodin [hydrocodone-acetaminophen]      Social History       Tobacco Use    Smoking status: Never    Smokeless tobacco: Never   Vaping Use    Vaping status: Never Used   Substance Use Topics    Alcohol use: Never    Drug use: Never         Family History         Problem Relation Name Age of Onset    Atrial fibrillation Other        Prostate cancer Other

## 2025-06-30 ENCOUNTER — HOME HEALTH ADMISSION (OUTPATIENT)
Dept: HOME HEALTH SERVICES | Facility: HOME HEALTH | Age: 75
End: 2025-06-30
Payer: MEDICARE

## 2025-06-30 ENCOUNTER — DOCUMENTATION (OUTPATIENT)
Dept: HOME HEALTH SERVICES | Facility: HOME HEALTH | Age: 75
End: 2025-06-30
Payer: MEDICARE

## 2025-06-30 VITALS
HEIGHT: 65 IN | BODY MASS INDEX: 33.41 KG/M2 | TEMPERATURE: 98.8 F | OXYGEN SATURATION: 92 % | WEIGHT: 200.5 LBS | DIASTOLIC BLOOD PRESSURE: 73 MMHG | SYSTOLIC BLOOD PRESSURE: 123 MMHG | HEART RATE: 86 BPM | RESPIRATION RATE: 18 BRPM

## 2025-06-30 PROBLEM — I20.81 ANGINA PECTORIS WITH CORONARY MICROVASCULAR DYSFUNCTION: Status: RESOLVED | Noted: 2025-06-24 | Resolved: 2025-06-30

## 2025-06-30 PROBLEM — Z98.890 S/P LEFT ATRIAL APPENDAGE LIGATION: Status: ACTIVE | Noted: 2025-06-30

## 2025-06-30 PROBLEM — I48.91 POSTOPERATIVE ATRIAL FIBRILLATION (MULTI): Status: ACTIVE | Noted: 2025-06-30

## 2025-06-30 PROBLEM — I97.89 POSTOPERATIVE ATRIAL FIBRILLATION (MULTI): Status: ACTIVE | Noted: 2025-06-30

## 2025-06-30 PROBLEM — Z95.1 S/P CABG X 3: Status: ACTIVE | Noted: 2025-06-30

## 2025-06-30 PROBLEM — I25.10 ATHEROSCLEROSIS OF NATIVE CORONARY ARTERY OF NATIVE HEART, UNSPECIFIED WHETHER ANGINA PRESENT: Status: RESOLVED | Noted: 2025-06-24 | Resolved: 2025-06-30

## 2025-06-30 LAB
ALBUMIN SERPL BCP-MCNC: 3.1 G/DL (ref 3.4–5)
ANION GAP SERPL CALC-SCNC: 12 MMOL/L
BUN SERPL-MCNC: 17 MG/DL (ref 6–23)
CALCIUM SERPL-MCNC: 8.8 MG/DL (ref 8.6–10.6)
CHLORIDE SERPL-SCNC: 103 MMOL/L (ref 98–107)
CO2 SERPL-SCNC: 25 MMOL/L (ref 21–32)
CREAT SERPL-MCNC: 0.69 MG/DL (ref 0.5–1.3)
EGFRCR SERPLBLD CKD-EPI 2021: >90 ML/MIN/1.73M*2
ERYTHROCYTE [DISTWIDTH] IN BLOOD BY AUTOMATED COUNT: 13.2 % (ref 11.5–14.5)
GLUCOSE SERPL-MCNC: 97 MG/DL (ref 74–99)
HCT VFR BLD AUTO: 27.8 % (ref 41–52)
HGB BLD-MCNC: 9.1 G/DL (ref 13.5–17.5)
MAGNESIUM SERPL-MCNC: 1.95 MG/DL (ref 1.6–2.4)
MCH RBC QN AUTO: 30.8 PG (ref 26–34)
MCHC RBC AUTO-ENTMCNC: 32.7 G/DL (ref 32–36)
MCV RBC AUTO: 94 FL (ref 80–100)
NRBC BLD-RTO: 0.3 /100 WBCS (ref 0–0)
PHOSPHATE SERPL-MCNC: 3.5 MG/DL (ref 2.5–4.9)
PLATELET # BLD AUTO: 212 X10*3/UL (ref 150–450)
POTASSIUM SERPL-SCNC: 3.8 MMOL/L (ref 3.5–5.3)
RBC # BLD AUTO: 2.95 X10*6/UL (ref 4.5–5.9)
SODIUM SERPL-SCNC: 136 MMOL/L (ref 136–145)
WBC # BLD AUTO: 7.8 X10*3/UL (ref 4.4–11.3)

## 2025-06-30 PROCEDURE — 2500000002 HC RX 250 W HCPCS SELF ADMINISTERED DRUGS (ALT 637 FOR MEDICARE OP, ALT 636 FOR OP/ED)

## 2025-06-30 PROCEDURE — 2500000001 HC RX 250 WO HCPCS SELF ADMINISTERED DRUGS (ALT 637 FOR MEDICARE OP): Performed by: NURSE PRACTITIONER

## 2025-06-30 PROCEDURE — 99239 HOSP IP/OBS DSCHRG MGMT >30: CPT | Performed by: NURSE PRACTITIONER

## 2025-06-30 PROCEDURE — 2500000001 HC RX 250 WO HCPCS SELF ADMINISTERED DRUGS (ALT 637 FOR MEDICARE OP)

## 2025-06-30 PROCEDURE — 85027 COMPLETE CBC AUTOMATED: CPT | Performed by: NURSE PRACTITIONER

## 2025-06-30 PROCEDURE — 2500000002 HC RX 250 W HCPCS SELF ADMINISTERED DRUGS (ALT 637 FOR MEDICARE OP, ALT 636 FOR OP/ED): Performed by: NURSE PRACTITIONER

## 2025-06-30 PROCEDURE — 2500000004 HC RX 250 GENERAL PHARMACY W/ HCPCS (ALT 636 FOR OP/ED): Performed by: NURSE PRACTITIONER

## 2025-06-30 PROCEDURE — 83735 ASSAY OF MAGNESIUM: CPT | Performed by: NURSE PRACTITIONER

## 2025-06-30 PROCEDURE — 36415 COLL VENOUS BLD VENIPUNCTURE: CPT | Performed by: NURSE PRACTITIONER

## 2025-06-30 PROCEDURE — 84100 ASSAY OF PHOSPHORUS: CPT | Performed by: NURSE PRACTITIONER

## 2025-06-30 RX ORDER — IRON POLYSACCHARIDE COMPLEX 150 MG
150 CAPSULE ORAL DAILY
Status: DISCONTINUED | OUTPATIENT
Start: 2025-07-01 | End: 2025-06-30 | Stop reason: HOSPADM

## 2025-06-30 RX ORDER — LANOLIN ALCOHOL/MO/W.PET/CERES
800 CREAM (GRAM) TOPICAL ONCE
Status: COMPLETED | OUTPATIENT
Start: 2025-06-30 | End: 2025-06-30

## 2025-06-30 RX ORDER — CALCIUM CARBONATE 300MG(750)
400 TABLET,CHEWABLE ORAL DAILY
Qty: 10 TABLET | Refills: 0 | Status: SHIPPED | OUTPATIENT
Start: 2025-06-30 | End: 2025-07-10

## 2025-06-30 RX ORDER — AMIODARONE HYDROCHLORIDE 200 MG/1
200 TABLET ORAL DAILY
Qty: 30 TABLET | Refills: 0 | Status: SHIPPED | OUTPATIENT
Start: 2025-06-30 | End: 2025-07-30

## 2025-06-30 RX ORDER — PRAVASTATIN SODIUM 80 MG/1
80 TABLET ORAL NIGHTLY
Qty: 30 TABLET | Refills: 0 | Status: SHIPPED | OUTPATIENT
Start: 2025-06-30 | End: 2025-07-18 | Stop reason: SDUPTHER

## 2025-06-30 RX ORDER — FUROSEMIDE 20 MG/1
20 TABLET ORAL DAILY
Qty: 10 TABLET | Refills: 0 | Status: SHIPPED | OUTPATIENT
Start: 2025-07-01 | End: 2025-07-09 | Stop reason: ALTCHOICE

## 2025-06-30 RX ORDER — DOCUSATE SODIUM 100 MG/1
100 CAPSULE, LIQUID FILLED ORAL 2 TIMES DAILY
Status: DISCONTINUED | OUTPATIENT
Start: 2025-06-30 | End: 2025-06-30 | Stop reason: HOSPADM

## 2025-06-30 RX ORDER — POTASSIUM CHLORIDE 20 MEQ/1
40 TABLET, EXTENDED RELEASE ORAL ONCE
Status: COMPLETED | OUTPATIENT
Start: 2025-06-30 | End: 2025-06-30

## 2025-06-30 RX ORDER — IRON POLYSACCHARIDE COMPLEX 150 MG
150 CAPSULE ORAL DAILY
Qty: 30 CAPSULE | Refills: 0 | Status: SHIPPED | OUTPATIENT
Start: 2025-07-01 | End: 2025-07-31

## 2025-06-30 RX ORDER — POLYETHYLENE GLYCOL 3350 17 G/17G
17 POWDER, FOR SOLUTION ORAL DAILY PRN
COMMUNITY
Start: 2025-06-30

## 2025-06-30 RX ORDER — ASPIRIN 81 MG/1
81 TABLET ORAL DAILY
Status: DISCONTINUED | OUTPATIENT
Start: 2025-07-01 | End: 2025-06-30 | Stop reason: HOSPADM

## 2025-06-30 RX ORDER — POTASSIUM CHLORIDE 750 MG/1
10 TABLET, FILM COATED, EXTENDED RELEASE ORAL DAILY
Qty: 10 TABLET | Refills: 0 | Status: SHIPPED | OUTPATIENT
Start: 2025-06-30 | End: 2025-07-09 | Stop reason: ALTCHOICE

## 2025-06-30 RX ORDER — ACETAMINOPHEN 325 MG/1
650 TABLET ORAL EVERY 6 HOURS PRN
COMMUNITY
Start: 2025-06-30

## 2025-06-30 RX ORDER — DOCUSATE SODIUM 100 MG/1
100 CAPSULE, LIQUID FILLED ORAL 2 TIMES DAILY PRN
COMMUNITY
Start: 2025-06-30

## 2025-06-30 RX ORDER — METOPROLOL TARTRATE 25 MG/1
25 TABLET, FILM COATED ORAL 2 TIMES DAILY
Qty: 60 TABLET | Refills: 0 | Status: SHIPPED | OUTPATIENT
Start: 2025-06-30 | End: 2026-06-30

## 2025-06-30 RX ADMIN — FUROSEMIDE 40 MG: 40 TABLET ORAL at 09:51

## 2025-06-30 RX ADMIN — AMIODARONE HYDROCHLORIDE 400 MG: 200 TABLET ORAL at 09:50

## 2025-06-30 RX ADMIN — ASPIRIN 81 MG: 81 TABLET, CHEWABLE ORAL at 09:51

## 2025-06-30 RX ADMIN — SIMETHICONE 80 MG: 80 TABLET, CHEWABLE ORAL at 07:28

## 2025-06-30 RX ADMIN — Medication 1 TABLET: at 09:51

## 2025-06-30 RX ADMIN — METOPROLOL TARTRATE 25 MG: 25 TABLET, FILM COATED ORAL at 09:47

## 2025-06-30 RX ADMIN — MAGNESIUM OXIDE TAB 400 MG (241.3 MG ELEMENTAL MG) 2 TABLET: 400 (241.3 MG) TAB at 09:47

## 2025-06-30 RX ADMIN — HEPARIN SODIUM 5000 UNITS: 5000 INJECTION INTRAVENOUS; SUBCUTANEOUS at 09:53

## 2025-06-30 RX ADMIN — HEPARIN SODIUM 5000 UNITS: 5000 INJECTION INTRAVENOUS; SUBCUTANEOUS at 01:20

## 2025-06-30 RX ADMIN — POTASSIUM CHLORIDE 40 MEQ: 1500 TABLET, EXTENDED RELEASE ORAL at 09:45

## 2025-06-30 ASSESSMENT — COGNITIVE AND FUNCTIONAL STATUS - GENERAL
MOBILITY SCORE: 20
DAILY ACTIVITIY SCORE: 21
MOVING TO AND FROM BED TO CHAIR: A LITTLE
CLIMB 3 TO 5 STEPS WITH RAILING: A LITTLE
WALKING IN HOSPITAL ROOM: A LITTLE
HELP NEEDED FOR BATHING: A LITTLE
TOILETING: A LITTLE
STANDING UP FROM CHAIR USING ARMS: A LITTLE
DRESSING REGULAR UPPER BODY CLOTHING: A LITTLE

## 2025-06-30 ASSESSMENT — PAIN - FUNCTIONAL ASSESSMENT
PAIN_FUNCTIONAL_ASSESSMENT: 0-10

## 2025-06-30 ASSESSMENT — PAIN SCALES - GENERAL
PAINLEVEL_OUTOF10: 0 - NO PAIN

## 2025-06-30 NOTE — HH CARE COORDINATION
Home Care received a Referral for Nursing, Physical Therapy, and Occupational Therapy. We have processed the referral for a Start of Care on 07/01-07/02.     If you have any questions or concerns, please feel free to contact us at [phone]. Follow the prompts, enter your five digit zip code, and you will be directed to your care team on [facility].

## 2025-06-30 NOTE — SIGNIFICANT EVENT
Atrial and ventricular wires cut at skin level at 1045 due to surgeon preference.  Patient instructed to notify radiology of retained epicardial wires prior to any MRI procedure, and to notify Dr Herr of any visible wires or s/s infection.     KAREEM Palencia-CNP   [facility] Cardiac surgery  Team phone [phone]

## 2025-06-30 NOTE — PROGRESS NOTES
06/30/25 1532   Discharge Planning   Living Arrangements Spouse/significant other   Support Systems Spouse/significant other;Children   Assistance Needed Wheeled Walker   Type of Residence Private residence   Type of Home Care Services Home nursing visits;DME or oxygen   Expected Discharge Disposition Home H  ( HC)   Does the patient need discharge transport arranged? No     Patient s/p cardiac surgery medically ready for discharge.  AOC is  HC.  Patient has been provided a wheeled walker through [employer] professional.  Walker was delivered to the bedside.

## 2025-06-30 NOTE — DISCHARGE SUMMARY
Discharge Diagnosis  Atherosclerosis of native coronary artery of native heart  S/p CABGx3, JAZMIN ligation  Postop atrial fibrillation    Issues Requiring Follow-Up  Pt to f/up with PCP, cardiologist, cardiac surgeon    Test Results Pending At Discharge  Pending Labs       No current pending labs.            Hospital Course  Yosvany Chase is a 74 y.o. male, with a PMH of hyperlipidemia, GERD, lumbar spine stenosis, phlebitis of leg, dermatitis, left subclavian stenosis and elevated CAC score (1408 2/5/2025) followed by + Nuclear stress test.      6/24/25 OPERATION/PROCEDURE:   #1 EVH Right Saphenous Vein  #2 CABG x 3, LIMA to LAD 30ml/3.0 PI, SVG to PDA sequenced to OM 63 ml/3.3 PI  #3 Left Atrial Appendage Ligation w/ 45mm AtriClip with Mei Herr MD     CTICU Course: Uneventful     Transferred to T3 on 6/25/25      Floor Course:  - Patient was diuresed for fluid volume overload post cardiac surgery; Preop weight: 91.4 kg, discharge wt: 90.9kg  - On ASA, statin, BB by discharge  - Epicardial wires CUT on 6/30  - had postop afib; amio and BB; last 6/28  - Telemetry at discharge: SR 60s-70s  - 2v CXR done 6/29/25  - Cardiac rehab referral was placed  - PT recs low intensity therapy; ordered wheeled walker  - Anticipate discharge to home with homecare    Discharged with home care RN and PT/OT on Monday, June 30, 2025; POD#6      On day of discharge, vital signs were stable and no acute distress was noted. All questions were answered. After VS and labs were reviewed it was determined the patient was stable for discharge.   Hospital day of discharge management- spent >30 minutes coordinating the discharge and counseling/educating patient and family regarding discharge instructions.  ==============================================    Past Medical History       Diagnosis Date    Agatston CAC score, >400 02/05/2025     Total 1408.02: LM 73.15   .9   LCx 791.52   .45    Atherosclerosis of native coronary  artery of native heart, unspecified whether angina present       Plan: Coronary Artery Bypass Graft x 3 Vessels; LIMA; RADIAL; VEIN 6/24/25    Cardiology follow-up encounter       Omer Pringle MD LOV 4/23/25    Diverticulosis, sigmoid      H/O echocardiogram 05/28/2025     CONCLUSIONS:   1. Left ventricular ejection fraction is normal calculated by Noriega's biplane at 70%.   2. Spectral Doppler shows a Grade I (impaired relaxation pattern) of left ventricular diastolic filling with normal left atrial filling pressure.   3. Poorly visualized anatomical structures due to suboptimal image quality.   4. There is normal right ventricular global systolic function.    History of cardiovascular stress test 05/08/2025     1. Note is made of a partially fixed defect along the inferior wall.   Findings are concerning for prior infarct particularly along the basal aspect the inferior wall with moderate melina-infarct ischemia along the mid to distal aspect.   2. The left ventricle is normal in size.   3. Decreased wall motion & thickening particularly along the basal to mid inferior wall with an LV EF estimated at 63%.    Hyperlipidemia      Obesity      S/P cardiac cath 05/23/2025    Vitamin D deficiency       [Surgical History]    [Surgical History]  Past Surgical History        Procedure Laterality Date    CARDIAC CATHETERIZATION N/A 05/23/2025     Procedure: LHC, With LV;  Surgeon: Omer Pringle MD;  Location: [facility] Cardiac Cath Lab;  Service: Cardiovascular;  Laterality: N/A;    COLONOSCOPY        ROTATOR CUFF REPAIR Right           Prescriptions Prior to Admission          Medications Prior to Admission   Medication Sig Dispense Refill Last Dose/Taking    aspirin 81 mg EC tablet Take 1 tablet (81 mg) by mouth once daily. 30 tablet 11 6/24/2025 Morning    chlorhexidine (Peridex) 0.12 % solution Swish for 30 seconds and spit 15mL of solution the night before and morning of surgery 475 mL 0 6/24/2025 Morning     cholecalciferol (Vitamin D-3) 50 mcg (2,000 unit) capsule Take 1 capsule (50 mcg) by mouth once daily.     Past Week    isosorbide mononitrate ER (Imdur) 30 mg 24 hr tablet Take 1 tablet (30 mg) by mouth once daily. Do not crush or chew. 90 tablet 1 6/23/2025    multivitamin with minerals tablet Take 1 tablet by mouth once daily.     Past Week    pravastatin (Pravachol) 20 mg tablet Take 1 tablet (20 mg) by mouth once daily. 30 tablet 11 6/24/2025 Morning     Allergies: Crestor [rosuvastatin] and Vicodin [hydrocodone-acetaminophen]      Social History       Tobacco Use    Smoking status: Never    Smokeless tobacco: Never   Vaping Use    Vaping status: Never Used   Substance Use Topics    Alcohol use: Never    Drug use: Never         Family History         Problem Relation Name Age of Onset    Atrial fibrillation Other        Prostate cancer Other       ===============================================      Visit Vitals  /71 (BP Location: Right arm, Patient Position: Sitting)   Pulse 73   Temp 36.3 °C (97.3 °F) (Temporal)   Resp 18     Vitals:    06/30/25 0600   Weight: 90.9 kg (200 lb 8 oz)       Immunization History   Administered Date(s) Administered    Flu vaccine, quadrivalent, high-dose, preservative free, age 65y+ (FLUZONE) 12/16/2022, 10/16/2023    Flu vaccine, trivalent, preservative free, HIGH-DOSE, age 65y+ (Fluzone) 10/28/2019, 10/07/2024    Influenza, Unspecified 11/01/2017    Tiffanie SARS-CoV-2 Vaccination 07/01/2021    Pneumococcal conjugate vaccine, 13-valent (PREVNAR 13) 02/09/2018    Pneumococcal polysaccharide vaccine, 23-valent, age 2 years and older (PNEUMOVAX 23) 10/28/2019    Zoster vaccine, recombinant, adult (SHINGRIX) 10/20/2023       Results      Results for orders placed or performed during the hospital encounter of 06/24/25 (from the past 24 hours)   Magnesium   Result Value Ref Range    Magnesium 1.95 1.60 - 2.40 mg/dL   CBC   Result Value Ref Range    WBC 7.8 4.4 - 11.3 x10*3/uL     nRBC 0.3 (H) 0.0 - 0.0 /100 WBCs    RBC 2.95 (L) 4.50 - 5.90 x10*6/uL    Hemoglobin 9.1 (L) 13.5 - 17.5 g/dL    Hematocrit 27.8 (L) 41.0 - 52.0 %    MCV 94 80 - 100 fL    MCH 30.8 26.0 - 34.0 pg    MCHC 32.7 32.0 - 36.0 g/dL    RDW 13.2 11.5 - 14.5 %    Platelets 212 150 - 450 x10*3/uL   Renal Function Panel   Result Value Ref Range    Glucose 97 74 - 99 mg/dL    Sodium 136 136 - 145 mmol/L    Potassium 3.8 3.5 - 5.3 mmol/L    Chloride 103 98 - 107 mmol/L    Bicarbonate 25 21 - 32 mmol/L    Anion Gap 12 mmol/L    Urea Nitrogen 17 6 - 23 mg/dL    Creatinine 0.69 0.50 - 1.30 mg/dL    eGFR >90 >60 mL/min/1.73m*2    Calcium 8.8 8.6 - 10.6 mg/dL    Phosphorus 3.5 2.5 - 4.9 mg/dL    Albumin 3.1 (L) 3.4 - 5.0 g/dL      XR chest 2 views  Result Date: 6/29/2025  1.  Small left and trace right pleural effusions with associated bibasilar atelectasis. No pneumothorax.       MACRO: None   Signed by: Bran Chawla 6/29/2025 12:42 PM Dictation workstation:   [id number]    XR chest 1 view  Result Date: 6/28/2025  1. Stable appearance of a trace right apical pneumothorax. 2. Streaky left basilar opacities, likely atelectasis.   I personally reviewed the image(s)/study and resident interpretation as stated by Dr. Riya Stacy MD. I agree with the findings as stated. This study was interpreted at [facility], [place], [place].   MACRO: None   Signed by: Ari Dye 6/28/2025 8:27 AM Dictation workstation:   [id number]    XR chest 1 view  Result Date: 6/27/2025  1. Interval removal of chest tubes with new small right pneumothorax. 2. Left-greater-than-right basilar hazy opacities, favored to be atelectatic in nature with interval aspiration and/or developing pneumonia not excluded.     I have reviewed the images/study and I agree with the findings as stated by Dr. Ubaldo Muller.   Signed by: Ari Dye 6/27/2025 2:41 PM Dictation workstation:   [id number]    XR chest 1 view  Result Date:  6/27/2025  1.  Low lung volumes with left basilar atelectasis. Small left pleural effusion. 2. Similar right subdiaphragmatic lucency, most likely representing prominent colonic bowel loop.   I personally reviewed the images/study and I agree with the findings as stated. This study was interpreted at [place], [place].   MACRO: None   Signed by: Ari Dye 6/27/2025 10:34 AM Dictation workstation:   [id number]    XR chest 1 view  Result Date: 6/26/2025  1. Similar right subdiaphragmatic lucency, which may represent postoperative pneumoperitoneum or prominent colonic bowel loop. Recommend correlation with recent surgical history.. If there is persistent concern for perforated viscus, recommend further with dedicated CT abdomen and pelvis. 2. Low lung volumes and bibasilar atelectasis. Suggestion of small left pleural effusion.     I have reviewed the images/study and I agree with the findings as stated by Dr. Ubaldo Muller.   Signed by: Ari Dye 6/26/2025 4:36 PM Dictation workstation:   [id number]    XR chest 1 view  Result Date: 6/25/2025  1.  No gross evidence of pneumothorax status post extubation. Low lung volumes and basilar atelectasis. 2. Right upper quadrant lucency may represent a small amount of postoperative pneumoperitoneum. Correlate with any concern for perforation.     Signed by: Jason Lucas 6/25/2025 12:45 PM Dictation workstation:   [id number]    XR chest 1 view  Result Date: 6/24/2025  1. The previously visualized right apical curvilinear radiopacities not seen and likely removed. 2. Presumed interval placement of enteric tube which courses midline with tip overlying the gastric cardia. The enteric tube side port is seen overlying the lower sucking. Recommend repositioning/advancement. 3. Remaining medical devices as above.     I have reviewed the images/study and I agree with the findings as stated by Dr. Ubaldo Muller.   Signed by: Tiffanie  Catherine Dicksonani 6/24/2025 4:09 PM Dictation workstation:   [id number]    XR femur right 1 view  Result Date: 6/24/2025  1.  Curvilinear metallic density measuring 1.8 cm overlying the apex of the right lung, consistent with a needle. After discussing the finding with the surgical team, finding was confirmed to represent an overlying needle external to the patient. There is otherwise no other evidence of retained surgical object. 2. Postsurgical changes of median sternotomy with multiple medical devices as detailed above. 3. Low lung volumes with pulmonary vascular congestion.   I personally reviewed the images/study and resident's interpretation and I agree with the findings as stated by Gladys Paredes MD (resident radiologist). This study was analyzed and interpreted at [place], [place].   MACRO: Gladys Paredes discussed the significance and urgency of this critical finding by telephone with  OR 19 staff on 6/24/2025 at 2:19 pm. (**-RCF-**) Findings:  See findings.     Signed by: Gilson Clifford 6/24/2025 2:53 PM Dictation workstation:   [id number]    XR chest 1 view  Result Date: 6/24/2025  1.  Curvilinear metallic density measuring 1.8 cm overlying the apex of the right lung, consistent with a needle. After discussing the finding with the surgical team, finding was confirmed to represent an overlying needle external to the patient. There is otherwise no other evidence of retained surgical object. 2. Postsurgical changes of median sternotomy with multiple medical devices as detailed above. 3. Low lung volumes with pulmonary vascular congestion.   I personally reviewed the images/study and resident's interpretation and I agree with the findings as stated by Gladys Paredes MD (resident radiologist). This study was analyzed and interpreted at [place], [place].   MACRO: Gladys Paredes discussed the significance and urgency of  this critical finding by telephone with  OR 19 staff on 6/24/2025 at 2:19 pm. (**-RCF-**) Findings:  See findings.     Signed by: Gilson Clifford 6/24/2025 2:53 PM Dictation workstation:   [id number]       Pertinent Physical Exam At Time of Discharge  Physical Exam  Please see progress note of 6/30/2025 for physical exam      Home Medications     Medication List      START taking these medications     acetaminophen 325 mg tablet; Commonly known as: Tylenol; Take 2 tablets   (650 mg) by mouth every 6 hours if needed for mild pain (1 - 3) or   moderate pain (4 - 6).   amiodarone 200 mg tablet; Commonly known as: Pacerone; Take 1 tablet   (200 mg) by mouth once daily. For 30 days.   docusate sodium 100 mg capsule; Commonly known as: Colace; Take 1   capsule (100 mg) by mouth 2 times a day as needed for constipation (hard   stools).   furosemide 20 mg tablet; Commonly known as: Lasix; Take 1 tablet (20 mg)   by mouth once daily for 10 days.; Start taking on: July 1, 2025   iron polysaccharides 150 mg iron capsule; Commonly known as:   Nu-Iron,Niferex; Take 1 capsule (150 mg) by mouth once daily. Do not fill   before July 1, 2025.; Start taking on: July 1, 2025   magnesium oxide 400 mg tablet; Commonly known as: Mag-Ox; Take 1 tablet   (400 mg) by mouth once daily for 10 days.   metoprolol tartrate 25 mg tablet; Commonly known as: Lopressor; Take 1   tablet (25 mg) by mouth 2 times a day.   polyethylene glycol 17 gram packet; Commonly known as: Glycolax,   Miralax; Take 17 g by mouth once daily as needed (constipation).   potassium chloride CR 10 mEq ER tablet; Commonly known as: Klor-Con;   Take 1 tablet (10 mEq) by mouth once daily for 10 days. Do not crush,   chew, or split.     CHANGE how you take these medications     pravastatin 80 mg tablet; Commonly known as: Pravachol; Take 1 tablet   (80 mg) by mouth once daily at bedtime.; What changed: medication   strength, how much to take, when to take this     CONTINUE  taking these medications     aspirin 81 mg EC tablet; Take 1 tablet (81 mg) by mouth once daily.   cholecalciferol 50 mcg (2,000 units) capsule; Commonly known as: Vitamin   D-3   multivitamin with minerals tablet     STOP taking these medications     isosorbide mononitrate ER 30 mg 24 hr tablet; Commonly known as: Imdur       Outpatient Follow-Up  Future Appointments   Date Time Provider Department Center   [facility]20[facility]  [id number]   7/28/2025  2:30 PM Mei Herr MD [facility]   10/6/2025  9:00 AM Mirlande Low MD [id number]       Maranda Cuadra, APRN-CNP  [facility] Cardiac surgery  Team phone [phone]

## 2025-06-30 NOTE — CARE PLAN
The clinical goals for the shift include Pt will remain HDS throughout this shift.      Problem: Safety - Adult  Goal: Free from fall injury  Outcome: Progressing     Problem: Discharge Planning  Goal: Discharge to home or other facility with appropriate resources  Outcome: Progressing     Problem: Chronic Conditions and Co-morbidities  Goal: Patient's chronic conditions and co-morbidity symptoms are monitored and maintained or improved  Outcome: Progressing     Problem: Nutrition  Goal: Nutrient intake appropriate for maintaining nutritional needs  Outcome: Progressing     Problem: Skin  Goal: Decreased wound size/increased tissue granulation at next dressing change  Outcome: Progressing  Goal: Participates in plan/prevention/treatment measures  Outcome: Progressing  Goal: Prevent/manage excess moisture  Outcome: Progressing  Goal: Prevent/minimize sheer/friction injuries  Outcome: Progressing  Goal: Promote/optimize nutrition  Outcome: Progressing  Goal: Promote skin healing  Outcome: Progressing     Problem: Fall/Injury  Goal: Not fall by end of shift  Outcome: Progressing  Goal: Be free from injury by end of the shift  Outcome: Progressing  Goal: Verbalize understanding of personal risk factors for fall in the hospital  Outcome: Progressing  Goal: Verbalize understanding of risk factor reduction measures to prevent injury from fall in the home  Outcome: Progressing  Goal: Use assistive devices by end of the shift  Outcome: Progressing  Goal: Pace activities to prevent fatigue by end of the shift  Outcome: Progressing

## 2025-06-30 NOTE — PROGRESS NOTES
"CARDIAC SURGERY DAILY PROGRESS NOTE    Yosvany Chase is a 74 y.o. male, with a PMH of hyperlipidemia, GERD, lumbar spine stenosis, phlebitis of leg, dermatitis, left subclavian stenosis and elevated CAC score (1408 2/5/2025) followed by + Nuclear stress test.     6/24/25 OPERATION/PROCEDURE:   #1 EVH Right Saphenous Vein  #2 CABG x 3, LIMA to LAD 30ml/3.0 PI, SVG to PDA sequenced to OM 63 ml/3.3 PI  #3 Left Atrial Appendage Ligation w/ 45mm AtriClip with Mei Herr MD    CTICU Course: Uneventful    Transferred to T3 on 6/25/25    ==========================================    Interval History:   No acute events overnight    SUBJECTIVE:  No complaints; eager for discharge    Objective   /71 (BP Location: Right arm, Patient Position: Sitting)   Pulse 73   Temp 36.3 °C (97.3 °F) (Temporal)   Resp 18   Ht 1.651 m (5' 5\")   Wt 90.9 kg (200 lb 8 oz)   SpO2 96%   BMI 33.36 kg/m²   0-10 (Numeric) Pain Score: 0 - No pain   3 Day Weight Change: -0.877 kg (-1 lb 14.9 oz) per day    Intake and Output    Intake/Output Summary (Last 24 hours) at 6/30/2025 1244  Last data filed at 6/29/2025 2000  Gross per 24 hour   Intake 600 ml   Output --   Net 600 ml       Physical Exam  Vitals and nursing note reviewed.   Constitutional:       General: He is not in acute distress.     Comments: Sitting in chair  Up walking with wife in halls, using wheeled walker   HENT:      Head: Normocephalic and atraumatic.      Mouth/Throat:      Mouth: Mucous membranes are moist.   Eyes:      Conjunctiva/sclera: Conjunctivae normal.   Cardiovascular:      Rate and Rhythm: Normal rate and regular rhythm.      Pulses: Normal pulses.      Heart sounds: Normal heart sounds.      Comments: TELE: SR 60s-70s  Wires capped -> cut during exam  Pulmonary:      Effort: Pulmonary effort is normal.      Breath sounds: Normal breath sounds.      Comments: RA  Sternum stable  Abdominal:      General: Bowel sounds are normal.      Palpations: Abdomen " is soft.      Comments: BM (+) 6/27     Genitourinary:     Comments: Voiding independently  Musculoskeletal:      Cervical back: Neck supple.      Right lower leg: Edema (trace) present.      Left lower leg: Edema (trace) present.      Comments: BRUNO Strong 5/5  Ambulates with a walker   Skin:     General: Skin is warm and dry.      Capillary Refill: Capillary refill takes less than 2 seconds.      Comments: midsternal chest incision C/D/I, well approximated, no s/s infection  right SVG incision C/D/I, well approximated, no s/s infection  Generalized bruising on chest and legs      Neurological:      General: No focal deficit present.      Mental Status: He is alert and oriented to person, place, and time.   Psychiatric:         Mood and Affect: Mood normal.         Speech: Speech normal.         Behavior: Behavior normal. Behavior is cooperative.         Cognition and Memory: Cognition normal.       Medications  Scheduled medications  Scheduled Medications[1]Continuous medications  Continuous Medications[2]PRN medications  PRN Medications[3]    Labs  Results for orders placed or performed during the hospital encounter of 06/24/25 (from the past 24 hours)   Magnesium   Result Value Ref Range    Magnesium 1.95 1.60 - 2.40 mg/dL   CBC   Result Value Ref Range    WBC 7.8 4.4 - 11.3 x10*3/uL    nRBC 0.3 (H) 0.0 - 0.0 /100 WBCs    RBC 2.95 (L) 4.50 - 5.90 x10*6/uL    Hemoglobin 9.1 (L) 13.5 - 17.5 g/dL    Hematocrit 27.8 (L) 41.0 - 52.0 %    MCV 94 80 - 100 fL    MCH 30.8 26.0 - 34.0 pg    MCHC 32.7 32.0 - 36.0 g/dL    RDW 13.2 11.5 - 14.5 %    Platelets 212 150 - 450 x10*3/uL   Renal Function Panel   Result Value Ref Range    Glucose 97 74 - 99 mg/dL    Sodium 136 136 - 145 mmol/L    Potassium 3.8 3.5 - 5.3 mmol/L    Chloride 103 98 - 107 mmol/L    Bicarbonate 25 21 - 32 mmol/L    Anion Gap 12 mmol/L    Urea Nitrogen 17 6 - 23 mg/dL    Creatinine 0.69 0.50 - 1.30 mg/dL    eGFR >90 >60 mL/min/1.73m*2    Calcium 8.8 8.6  - 10.6 mg/dL    Phosphorus 3.5 2.5 - 4.9 mg/dL    Albumin 3.1 (L) 3.4 - 5.0 g/dL         IMAGING/ DIAGNOSTIC TESTING:  I have personally reviewed the following test result(s):     XR chest 2 views 06/29/2025  Impression  1.  Small left and trace right pleural effusions with associated bibasilar atelectasis. No pneumothorax.     ================  IMPRESSION & PLAN:  ===============  POD # 6 s/p EVH Right Saphenous Vein, CABG x 3, LIMA to LAD 30ml/3.0 PI, SVG to PDA sequenced to OM 63 ml/3.3 PI, Left Atrial Appendage Ligation w/ 45mm AtriClip with Mei Herr MD on 6/24  - Increase activity/ ambulation; PT/OT  - Encourage IS, C/DB; respiratory therapy; wean O2 as teddy -> on RA  - Cardiac rehab referral   - Continue cardiac meds: ASA, BB, statin   - Pain and anticonstipation meds   - 6/27 Chest tubes removed   - 2v CXR done 6/29   - Postop echo N/A  - CUT epicardial wires prior to discharge -> wires CUT 6/30  - Tele until discharge  - Optimize nutrition and electrolytes  - 6/26 Noted pneumoperitoneum on XR since post op; consulted with fellow and felt this was likely operative in nature; sent secure chat to surgeon  - 6/27 Discussed with Dr. Herr; decided to do a general surgery consult;  General surgery recs:  Final read on CXR with subdiaphragmatic lucency likely representing prominent colonic bowel loop. Low concern for intra-abdominal pathology based on imaging and examination.   Recommendations:  - further imaging per primary team  - if concern for intraabdominal pathology based on exam or imaging, please obtain CT AP with PO and IV contrast and reengage ACS  - remainder of management per primary    - 6/28: right subdiaphragmatic lucency no longer seen on CXR.  D/t improvement on CXR and unremarkable abdominal exam. Will hold off on further imaging. Dr. Herr aware and ok with plan       Rhythm  - postop afib  - 6/30 TELE - SR 60s-70s; no afib noted on tele review  - 6/29 Tele: SR, HR 60s-70s (2 brief episodes of  AF RVR yesterday that resolved with metoprolol)   - 6/26 AF with RVR with rates in 160 bpm; amio bolus and infusion ordered following metoprolol 5 mg IVP x3. Rates beginning to decrease. One BP of 86 sys necessitated a 250 mL NS bolus  x1 with good response. Finally converted early in am with amio bolus and infusion   - 6/27 Consider po amio tomorrow  - 6/28: Transitioned to 400 mg PO Amio BID; will dc on 200 mg daily for 1 month  - 6/29: increased Metoprolol to 25 mg BID     Acute Blood Loss Anemia   Recent Labs     06/30/25  0558 06/29/25  0846 06/28/25  0642 06/27/25  0743 06/26/25  0702 06/25/25  0028 06/24/25  1441   HGB 9.1* 10.6* 10.0* 9.8* 10.1* 9.9* 12.3*   HCT 27.8* 32.2* 30.6* 29.6* 30.2* 31.2* 38.7*   - Daily labs, transfuse as indicated  - iron x 1 month and MV    Thrombocytopenia - resolved  Recent Labs     06/30/25  0558 06/29/25  0846 06/28/25  0642 06/27/25  0743 06/26/25  0702 06/25/25  0028 06/24/25  1441    236 174 143* 137* 137* 155   - Etiology likely postop/CPB related  - Continue to trend with daily CBCs    Volume/Electrolyte Status: Preop wt Weight: 91.4 kg (201 lb 8 oz)   Vitals:    06/30/25 0600   Weight: 90.9 kg (200 lb 8 oz)   Baseline Cr 0.87  - Weight: 90.9, 91.8 kg, 93.4 kg, 94.4, 96.3  - Adjust diuresis as needed for postop cardiac surgery hypervolemia  - Replete electrolytes for hypokalemia / hypomagnesemia / hypophosphatemia as needed; 6/29: replaced K; 6/30 replaced K and Mg  - Daily weights and strict I&Os  - Daily RFP while admitted  - 6/28: started Lasix 40 mg PO daily    Hyperlipidemia: Home Meds: Pravastatin 20 mg daily  Lab Results   Component Value Date    CHOL 237 (H) 10/07/2024    HDL 45.0 10/07/2024    VLDL 31 10/07/2024    TRIG 155 (H) 10/07/2024    NHDL 192 (H) 10/07/2024   - Continue Pravastatin  - Follow up lipid panel with PCP/ cardiologist for ongoing lipid management    Leukocytosis - resolved  Recent Labs     06/30/25  0558 06/29/25  0846 06/28/25  0642  25  0743 25  0702 25  0028 25  1441   WBC 7.8 7.8 9.1 11.4* 16.4* 13.8* 21.9*     Temp (36hrs), Av.7 °C (98 °F), Min:36.2 °C (97.2 °F), Max:37.1 °C (98.8 °F)  - Likely atelectasis/ postoperative in etiology  - Aggressive pulmonary hygiene  - Monitor for s/s infection  - Daily CBC to follow    VTE Prophylaxis: SCDs/TEDs, ambulation, SQ heparin  Code Status: Full Code    Dispo  - PT/OT recs Low Intensity Level of Care; Mobility Score: 18; ok for discharge  - ordered wheeled walker for dc  - Would benefit from homecare for cardiac surgery carepath and PT visits  - dc today  - Will continue to assess discharge needs    KAREEM Palencia-CNP  Cardiac Surgery [facility]  [facility]  Team Phone [phone]               [1] acetaminophen, 975 mg, oral, q6h  amiodarone, 400 mg, oral, BID  aspirin, 81 mg, oral, Daily  furosemide, 40 mg, oral, Daily  heparin, 5,000 Units, subcutaneous, q8h  metoprolol tartrate, 25 mg, oral, BID  multivitamin with minerals iron-free, 1 tablet, oral, Daily  polyethylene glycol, 17 g, oral, Daily  pravastatin, 80 mg, oral, Nightly  sennosides-docusate sodium, 2 tablet, oral, BID     [2]    [3] PRN medications: calcium carbonate, metoprolol, oxyCODONE, oxygen, simethicone

## 2025-06-30 NOTE — CARE PLAN
The patient's goals for the shift include  To remain in NSR throughout this shift     The clinical goals for the shift include Pt will remain HDS throughout this shift      Problem: Safety - Adult  Goal: Free from fall injury  Outcome: Progressing     Problem: Discharge Planning  Goal: Discharge to home or other facility with appropriate resources  Outcome: Progressing     Problem: Chronic Conditions and Co-morbidities  Goal: Patient's chronic conditions and co-morbidity symptoms are monitored and maintained or improved  Outcome: Progressing     Problem: Nutrition  Goal: Nutrient intake appropriate for maintaining nutritional needs  Outcome: Progressing     Problem: Skin  Goal: Decreased wound size/increased tissue granulation at next dressing change  Outcome: Progressing  Goal: Participates in plan/prevention/treatment measures  Outcome: Progressing  Goal: Prevent/manage excess moisture  Outcome: Progressing  Goal: Prevent/minimize sheer/friction injuries  Outcome: Progressing  Goal: Promote/optimize nutrition  Outcome: Progressing  Goal: Promote skin healing  Outcome: Progressing     Problem: Fall/Injury  Goal: Not fall by end of shift  Outcome: Progressing  Goal: Be free from injury by end of the shift  Outcome: Progressing  Goal: Verbalize understanding of personal risk factors for fall in the hospital  Outcome: Progressing  Goal: Verbalize understanding of risk factor reduction measures to prevent injury from fall in the home  Outcome: Progressing  Goal: Use assistive devices by end of the shift  Outcome: Progressing  Goal: Pace activities to prevent fatigue by end of the shift  Outcome: Progressing

## 2025-06-30 NOTE — NURSING NOTE
Discharge instructions reviewed with patient. Medication education with teach back provided. IV and tele monitors removed. Patient awaiting transport to [facility].     STARLA HONG RN

## 2025-07-01 ENCOUNTER — PATIENT OUTREACH (OUTPATIENT)
Dept: PRIMARY CARE | Facility: CLINIC | Age: 75
End: 2025-07-01
Payer: MEDICARE

## 2025-07-01 NOTE — PROGRESS NOTES
TCM completed 07/01/25   Discharge Facility: [facility]  Discharge Diagnosis: Atherosclerosis of native coronary artery of native heart S/p CABGx3, JAZMIN ligation; Postop atrial fibrillation  Admission Date: 6/24/25  Discharge Date: 6/30/25   Discharge Disposition: Home with [facility]    PCP Appointment Date: 10/6/25  Patient declined to schedule-will call back at a later date/time.       (Needs seen by: 7/13)  Specialist Appointment Date:   Cardiology-  7/10/25    Hospital Encounter and Summary Linked: Yes  Admission (Discharged) with Mei Herr MD (06/24/2025)                      --See discharge assessment below for further details--    Wrap Up  Wrap Up Additional Comments: TCM initial outreach post discharge completed successfully. Spoke with patient, he is home and doing very well today. Medications received post discharge from outside pharmacy. Patient lives with his spouse who assists as needed. Patient denied any questions regarding his discharge instructions, medication changes or hospital stay. TCM phone number was provided to patient, encouraged to call back with any non-emergent questions/concerns. Patient verbalized his understanding and states he has no questions or concerns at this time, but will call back if needed. Patient declined a PCP follow up, prefers to wait and heal before scheduling. Patient has cardiology follow up scheduled. (7/1/2025 11:33 AM)  Call End Time: 1132 (7/1/2025 11:33 AM)    Engagement  Call Start Time: 1131 (7/1/2025 11:33 AM)    Medications  Medications reviewed with patient/caregiver?: Yes (7/1/2025 11:33 AM)  Is the patient having any side effects they believe may be caused by any medication additions or changes?: No (7/1/2025 11:33 AM)  Does the patient have all medications ordered at discharge?: Yes (7/1/2025 11:33 AM)  Care Management Interventions: No intervention needed (7/1/2025 11:33 AM)  Prescription Comments: Home Medications-  START taking these medications:      Acetaminophen 325 mg tablet; Commonly known as: Tylenol; Take 2 tablets (650 mg) by mouth every 6 hours if needed for mild pain (1 - 3) or   moderate pain (4 - 6).   Amiodarone 200 mg tablet; Commonly known as: Pacerone; Take 1 tablet (200 mg) by mouth once daily. For 30 days.   Docusate sodium 100 mg capsule; Commonly known as: Colace; Take 1   capsule (100 mg) by mouth 2 times a day as needed for constipation (hard stools).   Furosemide 20 mg tablet; Commonly known as: Lasix; Take 1 tablet (20 mg) by mouth once daily for 10 days.; Start taking on: July 1, 2025   Iron polysaccharides 150 mg iron capsule; Commonly known as:   Nu-Iron,Niferex; Take 1 capsule (150 mg) by mouth once daily. Do not fill   before July 1, 2025.; Start taking on: July 1, 2025   Magnesium oxide 400 mg tablet; Commonly known as: Mag-Ox; Take 1 tablet (400 mg) by mouth once daily for 10 days.   Metoprolol tartrate 25 mg tablet; Commonly known as: Lopressor; Take 1 tablet (25 mg) by mouth 2 times a day.   Polyethylene glycol 17 gram packet; Commonly known as: Glycolax,   Miralax; Take 17 g by mouth once daily as needed (constipation).   Potassium chloride CR 10 mEq ER tablet; Commonly known as: Klor-Con; Take 1 tablet (10 mEq) by mouth once daily for 10 days. Do not crush, chew, or split.     CHANGE how you take these medications:     Pravastatin 80 mg tablet; Commonly known as: Pravachol; Take 1 tablet   (80 mg) by mouth once daily at bedtime.; What changed: medication   strength, how much to take, when to take this.       STOP taking these medications:     Isosorbide mononitrate ER 30 mg 24 hr tablet; Commonly known as: Imdur. (7/1/2025 11:33 AM)  Is the patient taking all medications as directed (includes completed medication regime)?: Yes (7/1/2025 11:33 AM)  Care Management Interventions: Provided patient education (7/1/2025 11:33 AM)  Medication Comments: Medications received from [facility] post discharge (7/1/2025 11:33  AM)    Appointments  Does the patient have a primary care provider?: Yes (7/1/2025 11:33 AM)  Care Management Interventions: Educated patient on importance of making appointment; Advised patient to make appointment (7/1/2025 11:33 AM)  Has the patient kept scheduled appointments due by today?: Not applicable (7/1/2025 11:33 AM)  Care Management Interventions: Advised to schedule with specialist; Advised to reschedule appointment (7/1/2025 11:33 AM)    Self Management  What is the home health agency?: [facility] (7/1/2025 11:33 AM)  Has home health visited the patient within 72 hours of discharge?: Call prior to 72 hours (7/1/2025 11:33 AM)  What Durable Medical Equipment (DME) was ordered?: Wheeled Walker (7/1/2025 11:33 AM)  Has all Durable Medical Equipment (DME) been delivered?: Yes (7/1/2025 11:33 AM)    Patient Teaching  Does the patient have access to their discharge instructions?: Yes (7/1/2025 11:33 AM)  Care Management Interventions: Reviewed instructions with patient (7/1/2025 11:33 AM)  What is the patient's perception of their health status since discharge?: Improving (7/1/2025 11:33 AM)  Is the patient/caregiver able to teach back the hierarchy of who to call/visit for symptoms/problems? PCP, Specialist, Home Health nurse, Urgent Care, ED, 911: Yes (7/1/2025 11:33 AM)

## 2025-07-02 ENCOUNTER — HOME CARE VISIT (OUTPATIENT)
Dept: HOME HEALTH SERVICES | Facility: HOME HEALTH | Age: 75
End: 2025-07-02
Payer: MEDICARE

## 2025-07-02 ENCOUNTER — TELEPHONE (OUTPATIENT)
Dept: PRIMARY CARE | Facility: CLINIC | Age: 75
End: 2025-07-02
Payer: MEDICARE

## 2025-07-02 VITALS
BODY MASS INDEX: 31.08 KG/M2 | OXYGEN SATURATION: 93 % | DIASTOLIC BLOOD PRESSURE: 70 MMHG | WEIGHT: 198 LBS | SYSTOLIC BLOOD PRESSURE: 120 MMHG | HEART RATE: 76 BPM | RESPIRATION RATE: 16 BRPM | HEIGHT: 67 IN

## 2025-07-02 PROCEDURE — G0299 HHS/HOSPICE OF RN EA 15 MIN: HCPCS | Mod: HHH

## 2025-07-02 PROCEDURE — 169592 NO-PAY CLAIM PROCEDURE

## 2025-07-02 ASSESSMENT — ENCOUNTER SYMPTOMS
LOWEST PAIN SEVERITY IN PAST 24 HOURS: 0/10
LAST BOWEL MOVEMENT: 67388
CHANGE IN APPETITE: DECREASED
PAIN: 1
HIGHEST PAIN SEVERITY IN PAST 24 HOURS: 4/10
DYSPNEA ACTIVITY LEVEL: AFTER AMBULATING 10 - 20 FT
PERSON REPORTING PAIN: PATIENT
PAIN LOCATION: CHEST
PAIN LOCATION - EXACERBATING FACTORS: TOUCHING IT
PAIN LOCATION - PAIN FREQUENCY: CONSTANT
SHORTNESS OF BREATH: 1
PAIN SEVERITY GOAL: 0/10
PAIN LOCATION - PAIN SEVERITY: 4/10
SUBJECTIVE PAIN PROGRESSION: GRADUALLY IMPROVING
APPETITE LEVEL: FAIR
STOOL FREQUENCY: DAILY

## 2025-07-02 ASSESSMENT — ACTIVITIES OF DAILY LIVING (ADL)
OASIS_M1830: 05
ENTERING_EXITING_HOME: SUPERVISION

## 2025-07-02 NOTE — TELEPHONE ENCOUNTER
Yosvany had a triple byass about a week ago.  Is going home inst. Said follow up withk you in a couple days.  He is follow ing up with cardiology.  Does he have to see you? Some of his meds did change.  [phone]

## 2025-07-02 NOTE — TELEPHONE ENCOUNTER
[facility]    Subjective     Patient: Carlos Campa  : 1943    Primary Care Provider: Marcus Cheng MD    Requesting Provider: As above    Follow-up (left foot pain)      History    Chief Complaint: Left foot ulcer    History of Present Illness: Patient returns today with a new neuropathic ulcer.  He reports that he has been working on a ladder a lot therefore putting extra pressure on his forefoot.  He notes he that has had a callus there but noticed the ulceration this morning.  He reports there was some drainage from it.  He denies any fever or chills.  He has placed himself in a postop-type shoe.  He is here for further evaluation.    Current Outpatient Medications on File Prior to Visit   Medication Sig Dispense Refill   • atorvastatin (LIPITOR) 80 MG tablet Take 80 mg by mouth Every Night.     • lisinopril-hydrochlorothiazide (PRINZIDE,ZESTORETIC) 20-12.5 MG per tablet Take 1 tablet by mouth Daily.     • apixaban (ELIQUIS) 5 MG tablet tablet Take 1 tablet by mouth 2 (Two) Times a Day. (Patient taking differently: Take 5 mg by mouth 2 (Two) Times a Day. ON HOLD AS OF 2020 AM BEING THE LAST DOSE.  DR. EARL NOTIFIED AND AWAITING APPROVAL) 180 tablet 3   • HYDROcodone-acetaminophen (NORCO) 7.5-325 MG per tablet Take 1-2 tablets by mouth Every 6 (Six) Hours As Needed for Moderate Pain  (as needed for post surgical pain). 60 tablet 0   • metoprolol tartrate (LOPRESSOR) 25 MG tablet Take 0.5 tablets by mouth 2 (Two) Times a Day. 90 tablet 3   • ondansetron (ZOFRAN) 4 MG tablet Take 1 tablet by mouth Every 6 (Six) Hours As Needed for Nausea or Vomiting. 30 tablet 0     No current facility-administered medications on file prior to visit.       Allergies   Allergen Reactions   • Sulfa Antibiotics Unknown (See Comments)     Pt was told as child he had an allergy.  Has not taken and doesn't know the response      Past Medical History:   Diagnosis Date   • A-fib  Surgery follow up appt made   (CMS/[facility])    • Acute kidney failure (CMS/[facility])    • Elevated cholesterol    • Hard of hearing    • Hyperlipidemia    • Hypertension    • Renal disorder    • Wears hearing aid in both ears    • Wears reading eyeglasses      Past Surgical History:   Procedure Laterality Date   • AMPUTATION DIGIT Left 1/7/2020    Procedure: AMPUTATION LEFT 2ND TOE;  Surgeon: Idania Osborn MD;  Location:  [facility] OR;  Service: Orthopedics   • APPENDECTOMY     • COLONOSCOPY  2006   • INCISION AND DRAINAGE LEG Left 10/23/2018    Procedure: INCISION AND DRAINAGE LEFT FOOT;  Surgeon: Idania Osborn MD;  Location:  [facility] OR;  Service: Orthopedics   • TOE AMPUTATION     • TONSILLECTOMY     • VENOUS ACCESS DEVICE (PORT) INSERTION N/A 10/23/2018    Procedure: GROSHONG CATHETER PLACEMENT;  Surgeon: Marquez Simons MD;  Location:  [facility] OR;  Service: General   • VENOUS ACCESS DEVICE (PORT) REMOVAL       History reviewed. No pertinent family history.   Social History     Socioeconomic History   • Marital status:      Spouse name: Not on file   • Number of children: Not on file   • Years of education: Not on file   • Highest education level: Not on file   Tobacco Use   • Smoking status: Never Smoker   • Smokeless tobacco: Never Used   Substance and Sexual Activity   • Alcohol use: Yes     Alcohol/week: 3.0 - 4.0 standard drinks     Types: 3 - 4 Shots of liquor per week     Comment: drinks a fifth a week DRINKS 3-5 DRINKS DAILY    • Drug use: No   • Sexual activity: Defer        Review of Systems   Constitutional: Negative.    HENT: Negative.    Eyes: Negative.    Respiratory: Negative.    Cardiovascular: Negative.    Gastrointestinal: Negative.    Endocrine: Negative.    Genitourinary: Negative.    Musculoskeletal: Positive for arthralgias.   Skin: Negative.    Allergic/Immunologic: Negative.    Neurological: Negative.    Hematological: Negative.    Psychiatric/Behavioral: Negative.        The following portions of the patient's  "history were reviewed and updated as appropriate: allergies, current medications, past family history, past medical history, past social history, past surgical history and problem list.      Objective      Physical Exam  Pulse 99   Ht 182.9 cm (72.01\")   Wt 95.4 kg (210 lb 6.4 oz)   SpO2 99%   BMI 28.53 kg/m²     Body mass index is 28.53 kg/m².    Patient is well developed, well nourished and in no acute distress.  Alert and oriented x 3.    Ortho Exam  Left foot exam: Ulcer under the 4th MT head measuring approximately 2.5 cm wide with exposed fat.  Malodorous. No change in dense neuropathy.  No other ulcers or pre-ulcerous lesions.  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     Medical Decision Making    Data Review:   none    Assessment:  1. Neuropathic ulcer, unspecified ulcer stage (CMS/[facility])        Plan:  New left foot neuropathic ulcer.  Using a scalpel, the ulcer was trimmed revealing malodorous ulcer that tunnels distally 2 cm and is approximately 1 cm deep.  I am concerned that the ulcer actually extends to the bone with involvement of the bone.  After debridement, the foot was dressed with a Bactroban dressing and the patient was given a half shoe.  He was instructed to keep all pressure off of the forefoot and do daily dressing changes.  Recommendation is that we get MRI of the left foot.  I will put him on or Levaquin and have given him a rx for bactroban as well.  Anaerobic and aerobic cultures were taken from the ulcer.  He will return following the MRI for further treatment recommendations.    Patient history, diagnosis and treatment plan discussed with Dr. Osborn.        Henrietta Zacarias PA-C  11/11/20  14:35 EST    "

## 2025-07-03 ENCOUNTER — HOME CARE VISIT (OUTPATIENT)
Dept: HOME HEALTH SERVICES | Facility: HOME HEALTH | Age: 75
End: 2025-07-03
Payer: MEDICARE

## 2025-07-03 VITALS
OXYGEN SATURATION: 96 % | DIASTOLIC BLOOD PRESSURE: 80 MMHG | TEMPERATURE: 98.3 F | SYSTOLIC BLOOD PRESSURE: 124 MMHG | HEART RATE: 85 BPM

## 2025-07-03 PROCEDURE — G0151 HHCP-SERV OF PT,EA 15 MIN: HCPCS | Mod: HHH

## 2025-07-03 SDOH — HEALTH STABILITY: PHYSICAL HEALTH
EXERCISE COMMENTS: ENCOURAGED WALKING AND PROGRESSIVELY INCREASE AS TOLERATED. ADVISED TO KEEP INTENSITY AT A LEVEL WHERE HE CAN STILL CARRY ON A CONVERSATION.

## 2025-07-03 ASSESSMENT — ENCOUNTER SYMPTOMS
PAIN LOCATION - RELIEVING FACTORS: NOTHING
PAIN LOCATION: RIGHT LEG
HIGHEST PAIN SEVERITY IN PAST 24 HOURS: 3/10
LOWEST PAIN SEVERITY IN PAST 24 HOURS: 0/10
PAIN LOCATION - PAIN SEVERITY: 1/10
PAIN LOCATION - RELIEVING FACTORS: NOTHING
PAIN LOCATION: CHEST
OCCASIONAL FEELINGS OF UNSTEADINESS: 0
PAIN LOCATION - PAIN QUALITY: SORE STIFF
PAIN LOCATION - PAIN SEVERITY: 1/10
PERSON REPORTING PAIN: PATIENT
PAIN: 1

## 2025-07-03 ASSESSMENT — ACTIVITIES OF DAILY LIVING (ADL): AMBULATION ASSISTANCE ON FLAT SURFACES: 1

## 2025-07-03 NOTE — CASE COMMUNICATION
Prior level of function: patient was ind prior to surgery and would walk a 1 mile into town.   Rehab potential for stated goals: patient is motivated and is compliant with instructions  Personal factors that may impact care: patient reports very little pain and is not longer taking pain meds. Ambulating often outdoors without dme. Vitals taken before and after walking and were with in normal ranges  Patient stated goal: to recover fully  and return to plof.     Patient is ind with mobility and undestands instructions given about progressively increasing ambulation. No further PT visits needed.

## 2025-07-04 NOTE — CASE COMMUNICATION
patient r leg incision  reddened sent picture to dr lujan patient denies pain or drainage  Primary Reason for Home Care:post cabg  assessment and instruction  Skilled Needs:comprhensive nursing assessment medication instruction  Precautions: universal  Instruction provided:reviewed all meds schedule and purpose and potential side effects instructed on incisionan  care and signs ofn infection encouraged coughing and deep breathing  and da fly wts  Patient response to instruction: verbalized understanding  Patient and Caregiver instructed on plan of care and visit frequency.   Patient and Caregiver in agreement with plan of care and visit frequency.    Discipline Communication: md  Plan for next visit:general assessment medication instruction instruct on wound care    Medication Reconciliation:    Demonstrates Adherence : Yes  Issues/Concerns: Yes, describe:above  Provide r Notified of Issues/Concerns: Yes  Caregiver Notified of Issues/Concerns: Yes  patient  response to instructions Verbalized Understanding  Pill bottles visualized during treatment Yes

## 2025-07-06 LAB
ATRIAL RATE: 70 BPM
P AXIS: 24 DEGREES
P OFFSET: 198 MS
P ONSET: 149 MS
PR INTERVAL: 128 MS
Q ONSET: 213 MS
QRS COUNT: 12 BEATS
QRS DURATION: 88 MS
QT INTERVAL: 414 MS
QTC CALCULATION(BAZETT): 447 MS
QTC FREDERICIA: 436 MS
R AXIS: -35 DEGREES
T AXIS: 59 DEGREES
T OFFSET: 420 MS
VENTRICULAR RATE: 70 BPM

## 2025-07-07 ENCOUNTER — HOME CARE VISIT (OUTPATIENT)
Dept: HOME HEALTH SERVICES | Facility: HOME HEALTH | Age: 75
End: 2025-07-07
Payer: MEDICARE

## 2025-07-07 VITALS
DIASTOLIC BLOOD PRESSURE: 72 MMHG | RESPIRATION RATE: 18 BRPM | SYSTOLIC BLOOD PRESSURE: 124 MMHG | OXYGEN SATURATION: 95 % | TEMPERATURE: 97 F | HEART RATE: 60 BPM

## 2025-07-07 DIAGNOSIS — Z95.1 S/P CABG X 3: ICD-10-CM

## 2025-07-07 DIAGNOSIS — D64.9 POSTOPERATIVE ANEMIA: ICD-10-CM

## 2025-07-07 PROCEDURE — G0299 HHS/HOSPICE OF RN EA 15 MIN: HCPCS | Mod: HHH

## 2025-07-07 ASSESSMENT — ENCOUNTER SYMPTOMS
LAST BOWEL MOVEMENT: 67393
STOOL FREQUENCY: DAILY
APPETITE LEVEL: GOOD
DENIES PAIN: 1
SHORTNESS OF BREATH: 1
CONSTIPATION: 1
DYSPNEA ACTIVITY LEVEL: AT REST
PERSON REPORTING PAIN: PATIENT
CHANGE IN APPETITE: INCREASED

## 2025-07-07 NOTE — CASE COMMUNICATION
patient to go to out patient lab for lab draw . due to poor venous access   r le incision has improved  Subjective:   Primary Reason for Home Care:post cabg assessment and instruction  Skilled Needs: comprehensive nursing assessment medication instruction wound assessment and care home safety instruction  Precautions: universal precautions   Instruction provided: reviewed all meds schedule and purpose and potential side effects instruct ed on incisional care and signs of infection instructed on diet and ways to prevent constipation  Patient and Caregiver response to instruction: verbalized understanding  Patient and Caregiver instructed on plan of care and visit frequency.   Patient in agreement with plan of care and visit frequency.    Discipline Communication: md  Plan for next visit: general assesment  cabg carepath    Medication Reconciliation:    Demonstrates Adhe rence : Yes  Issues/Concerns: Yes, describe:above  Provider Notified of Issues/Concerns: Yes  Caregiver Notified of Issues/Concerns: Yes  patient  response to instructions Verbalized Understanding  Pill bottles visualized during treatment Yes

## 2025-07-08 ENCOUNTER — HOME CARE VISIT (OUTPATIENT)
Dept: HOME HEALTH SERVICES | Facility: HOME HEALTH | Age: 75
End: 2025-07-08
Payer: MEDICARE

## 2025-07-08 NOTE — OP NOTE
CABG X3 LIMA TO LAD; SVG TO OM TO PDA, LEFT ATRIAL APPENDAGE LIGATION USING ATRICLIP ACHV45 Operative Note     Date: 2025  OR Location: [facility] OR    Name: Yosvany Chase, : 1950, Age: 74 y.o., MRN: [id number], Sex: male    Diagnosis  Pre-op Diagnosis      * Atherosclerosis of native coronary artery of native heart, unspecified whether angina present [I25.10] Post-op Diagnosis     * Atherosclerosis of native coronary artery of native heart, unspecified whether angina present [I25.10]     Procedures  CABG X3 LIMA TO LAD; SVG TO OM TO PDA, LEFT ATRIAL APPENDAGE LIGATION USING ATRICLIP ACHV45  91443 - PA CABG W/ARTERIAL GRAFT THREE ARTERIAL GRAFTS      Surgeons      * Mei Herr - Primary    Resident/Fellow/Other Assistant:  Surgeons and Role:     * Lanie Tyson MD - Assisting    Staff:   Circulator: Ladi  Scrub Person: Ena  Surgical Assistant: Lamin  Surgical Assistant: Lydia  [facility]  Melinda Scrub: Marvin    Anesthesia Staff: Anesthesiologist: Anupama Phan MD  CRNA: KAREEM Weaver-INOCENTE  C-AA: JARRED Reynaga  Perfusionist: Yessica Orellana    Procedure Summary  Anesthesia: General  ASA: IV  Estimated Blood Loss: ***mL  Intra-op Medications:   Administrations occurring from 0645 to 1410 on 25:   Medication Name Total Dose   heparin 1,000 unit/mL injection 47,000 Units   sodium chloride 0.9 % irrigation solution 4,000 mL   papaverine injection 180 mg   vancomycin (Vancocin) vial for injection 8 g   electrolyte-A (Plasmalyte-A) solution 1,000 mL   atropine 1 mg/mL 1 mg   calcium chloride 100 mg/mL syringe 1 g   ceFAZolin (Ancef) vial 1 g 4 g   electrolyte-A (Plasmalyte-A) solution Cannot be calculated   electrolyte-A (Plasmalyte-A) Cannot be calculated   EPINEPHrine (Adrenalin) 16 mcg/mL syringe - compounded 16 mcg   EPINEPHrine (Adrenalin) 4 mg in sodium chloride 0.9% 250 mL (16 mcg/mL) infusion (premix) 0.22 mg   esmolol (Brevibloc) injection 40  mg   etomidate (Amidate) injection 18 mg   fentaNYL (Sublimaze) injection 50 mcg/mL 1,000 mcg   lidocaine (cardiac) injection 2% prefilled syringe 100 mg   lidocaine (Xylocaine) injection 2 % 100 mg   magnesium sulfate 50 % injection 2 g   midazolam PF (Versed) injection 1 mg/mL 2 mg   nitroglycerin 100 mcg/mL D5W intracoronary syringe - compounded 300 mcg   norepinephrine (Levophed) 8 mg in sodium chloride 0.9% 250 mL (0.032 mg/mL) infusion (premix) 0.02 mg   norepinephrine (Levophed) 16 mcg/mL syringe for Anesthesia 24 mcg   perfusion prime builder 660 mL   phenylephrine (Abhijeet-Synephrine) injection 2,800 mcg   propofol (Diprivan) injection 10 mg/mL 170 mg   protamine injection 370 mg   rocuronium (ZeMuron) 50 mg/5 mL injection 250 mg   sodium bicarbonate injection 1 mEq/mL 25 mEq   sodium chloride 0.9 % infusion 250 mL   tranexamic acid (Cyklokapron) 5,000 mg in sodium chloride 0.9% 250 mL (20 mg/mL) infusion 2,449.52 mg         Intraprocedure I/O Totals       None           Specimen: No specimens collected              Drains and/or Catheters:   [REMOVED] Chest Tube 1 Pleural 28 Fr (Removed)   Function -20 cm H2O 06/25/25 0800   Chest Tube Air Leak No 06/25/25 0800   Patency Intervention Tip/tilt 06/25/25 0800   Drainage Description Serosanguineous 06/25/25 0800   Dressing Status Clean;Dry;Occlusive 06/25/25 0800   Site Assessment Clean;Dry;Intact 06/25/25 0800   Surrounding Skin Dry;Intact 06/25/25 0800   Output (mL) 20 mL 06/25/25 0800       [REMOVED] Urethral Catheter Non-latex 14 Fr. (Removed)   Site Assessment Clean;Skin intact 06/25/25 0000   Collection Container Urometer 06/24/25 2000   Securement Method Securing device (Describe) 06/24/25 2000   Reason for Continuing Urinary Catheterization accurate hourly measurement of urine volume in a critically ill patient that cannot be assessed by other volumes and urine collection strategies 06/24/25 2000   Output (mL) 30 mL 06/25/25 0530       [REMOVED] Y Chest  Tube 1 and 2 1 Mediastinal 28 Fr. 2 Mediastinal 28 Fr. (Removed)   Function -20 cm H2O 06/27/25 0934   Chest Tube Air Leak No 06/27/25 0934   Patency Intervention Tip/tilt 06/27/25 0934   Drainage Description 1 Serosanguineous 06/27/25 0934   Dressing Status 1 Clean;Dry;Intact 06/27/25 0934   Site Assessment 1 Clean;Dry;Intact 06/27/25 0934   Surrounding Skin 1 Dry;Intact 06/27/25 0934   Drainage Description 2 Serosanguineous 06/26/25 1800   Dressing Status 2 Clean;Dry;Intact 06/27/25 0400   Site Assessment 2 Other (Comment) 06/27/25 0400   Surrounding Skin Intact;Dry 06/26/25 1800   Output (mL) 40 mL 06/27/25 0934       Tourniquet Times:         Implants:  Implants       Type Name Action Serial No.      Other Cardiac Implant GUIDE, SELECTION, GILLINOV-JIE ATRICLIP - [id number] Used, Not Implanted      Other Cardiac Implant ATRICLIP, FLEX-V DEVICE, 45MM STERILE, ACHV45 - [id number] Implanted               Findings: ***    Indications: Yosvany Chase is an 74 y.o. male who is having surgery for Atherosclerosis of native coronary artery of native heart, unspecified whether angina present [I25.10]. ***    The patient was seen in the preoperative area. The risks, benefits, complications, treatment options, non-operative alternatives, expected recovery and outcomes were discussed with the patient. The possibilities of reaction to medication, pulmonary aspiration, injury to surrounding structures, bleeding, recurrent infection, the need for additional procedures, failure to diagnose a condition, and creating a complication requiring transfusion or operation were discussed with the patient. The patient concurred with the proposed plan, giving informed consent.  The site of surgery was properly noted/marked if necessary per policy. The patient has been actively warmed in preoperative area. Preoperative antibiotics {OR OPERATIVE ANTIBIOTICS:[id number]} Venous thrombosis prophylaxis {OR OPERATIVE  "PROPHYLAXIS:[id number]}    Procedure Details: ***  Evidence of Infection: {OR EVIDENCE OF INFECTION:78374::\"No \"}  Complications:  {findings; complications:93422::\"None; patient tolerated the procedure well.\"}    Disposition: {Op note disposition:93618}  Condition: {stable/unstable:46035::\"stable\"}         Task Performed by RNFA or Surgical Assistant:  ***          Additional Details: ***    Attending Attestation: {Op note attestation (Optional):89741}    Mei Herr  Phone Number: [phone]      "

## 2025-07-09 ENCOUNTER — OFFICE VISIT (OUTPATIENT)
Dept: CARDIOLOGY | Facility: HOSPITAL | Age: 75
End: 2025-07-09
Payer: MEDICARE

## 2025-07-09 ENCOUNTER — APPOINTMENT (OUTPATIENT)
Dept: PRIMARY CARE | Facility: CLINIC | Age: 75
End: 2025-07-09
Payer: MEDICARE

## 2025-07-09 VITALS
SYSTOLIC BLOOD PRESSURE: 102 MMHG | BODY MASS INDEX: 31.48 KG/M2 | WEIGHT: 201 LBS | OXYGEN SATURATION: 98 % | HEART RATE: 72 BPM | DIASTOLIC BLOOD PRESSURE: 52 MMHG

## 2025-07-09 VITALS
DIASTOLIC BLOOD PRESSURE: 7 MMHG | SYSTOLIC BLOOD PRESSURE: 114 MMHG | OXYGEN SATURATION: 97 % | WEIGHT: 199.74 LBS | BODY MASS INDEX: 31.28 KG/M2

## 2025-07-09 DIAGNOSIS — Z95.1 S/P CABG X 3: Primary | ICD-10-CM

## 2025-07-09 DIAGNOSIS — R93.1 ELEVATED CORONARY ARTERY CALCIUM SCORE: ICD-10-CM

## 2025-07-09 DIAGNOSIS — I97.89 POSTOPERATIVE ATRIAL FIBRILLATION (MULTI): ICD-10-CM

## 2025-07-09 DIAGNOSIS — E78.5 HYPERLIPIDEMIA, UNSPECIFIED HYPERLIPIDEMIA TYPE: Primary | ICD-10-CM

## 2025-07-09 DIAGNOSIS — I25.10 CORONARY ARTERIOSCLEROSIS: ICD-10-CM

## 2025-07-09 DIAGNOSIS — I48.91 POSTOPERATIVE ATRIAL FIBRILLATION (MULTI): ICD-10-CM

## 2025-07-09 PROCEDURE — 99495 TRANSJ CARE MGMT MOD F2F 14D: CPT | Performed by: FAMILY MEDICINE

## 2025-07-09 PROCEDURE — 1160F RVW MEDS BY RX/DR IN RCRD: CPT | Performed by: FAMILY MEDICINE

## 2025-07-09 PROCEDURE — 1111F DSCHRG MED/CURRENT MED MERGE: CPT | Performed by: FAMILY MEDICINE

## 2025-07-09 PROCEDURE — 99214 OFFICE O/P EST MOD 30 MIN: CPT | Performed by: NURSE PRACTITIONER

## 2025-07-09 PROCEDURE — 1159F MED LIST DOCD IN RCRD: CPT | Performed by: FAMILY MEDICINE

## 2025-07-09 PROCEDURE — 1111F DSCHRG MED/CURRENT MED MERGE: CPT | Performed by: NURSE PRACTITIONER

## 2025-07-09 PROCEDURE — 1036F TOBACCO NON-USER: CPT | Performed by: FAMILY MEDICINE

## 2025-07-09 PROCEDURE — 99212 OFFICE O/P EST SF 10 MIN: CPT

## 2025-07-09 PROCEDURE — 1159F MED LIST DOCD IN RCRD: CPT | Performed by: NURSE PRACTITIONER

## 2025-07-09 PROCEDURE — G2211 COMPLEX E/M VISIT ADD ON: HCPCS | Performed by: NURSE PRACTITIONER

## 2025-07-09 ASSESSMENT — PATIENT HEALTH QUESTIONNAIRE - PHQ9
1. LITTLE INTEREST OR PLEASURE IN DOING THINGS: NOT AT ALL
SUM OF ALL RESPONSES TO PHQ9 QUESTIONS 1 AND 2: 0
2. FEELING DOWN, DEPRESSED OR HOPELESS: NOT AT ALL

## 2025-07-09 NOTE — PATIENT INSTRUCTIONS
"For your bone health,  you want to be getting at least 3 servings of a high calcium food or drink every day.  For example:  1 cup of milk, 1 cup of yogurt, or 1 ounce of hard cheese   EACH  has 300 mg.  You can also find calcium in some non-dairy foods such as broccoli and almonds.  The daily goal  is 1000 - 1200 mg of TOTAL calcium intake a day.   If you are able,  you can easily find a list of high calcium foods on the Internet.      Also, most people need to take a Vitamin D 3 supplement,   For small children, the dose is 400 IU a day, and older children should have 800 IU a day.   Adults can be 1000 - 2000 IU a day, with some needing 5000 a day.   Be sure to verify  how much you should take.   LOOK FOR USP CERTIFIED vitamins.    This is a label you will find on the vitamins which verifies how good they are.         Strongly think about cardiac rehab - can discuss with Izzy today      You are doing a good job taking your meds.  - complete them as directed.        Keep your appt in October .          For General Healthy Nutrition    (Remember - NOT A DIET!   Diets are only good for class reunions.)    These are my general good nutrition recommendations for most people.   I use the term \" diet \"  in these instructions to mean your overall nutrition - how you eat and drink.   If we talked about something different during your visit with me,  other than what is written below,  follow that advice instead.       For most people,  eating healthier means getting less added sugar and less processed foods in your diet    The fresher the better.    Added sugar is now a part of the nutrition label on manufactured food, so you can keep an eye on it easier.    But basically,  foods and beverages  that contain regular sugar and corn syrup are the main sources of added sugars.  Eating as little of these foods as you can is best.   One shocking example of the epidemic of added sugar is soda.    One can of regular soda contains " about as much added sugar as 3 regular size doughnuts!     The other issue with processed foods is the amount of processed grains they contain , such as white flour.    This is also something you want to try to limit in your diet.     But, grain products are very important for your nutrition.    Whole grains are better for your body.     Cutting back on white breads, traditional pasta, baked goods, white rice,  and processed cereals will be healthier for you.   The better choices include whole grain breads,  whole wheat pasta,  brown rice, quinoa, barley, steel cut  or rolled oats.   If you eat cereal for breakfast, try to look for one made with whole grains and less sugar.   There are many people who have a problem with gluten, for a large variety of reasons.    Generally,  products made with wheat flour , barley or rye are the primary source of gluten.       Cutting back on saturated fats is important.    You want the majority of the meat that you eat to be chicken, fish or turkey.   Baked or broiled is best -  fried adds too much fat.    There are healthy fats that are important - fat is important for holding down appetite, vitamin absorption and several metabolic processes in the body.  Monounsaturated fats raise HDL (good cholesterol) and lower LDL (bad cholesterol).   Olive oil, peanut oil, nuts, seeds, and avocados are great sources of the good fats.       Ideas are:   Trade sour cream dip for hummus (which is rich in olive oil) or guacamole; use veggies or whole-wheat chips to dip.    Nuts are an excellent source of protein and healthy fats.   Tree nuts are the best kind, such as almonds or walnuts.   Just be careful - they are high in calories, so stick to a serving size.  (Most are about 200 calories for a 1/4 cup)      Proteins are very important for your body, and they also hold down your appetite.   Try to have protein with every meal.    These generally are meats, nuts, many beans, legumes, eggs, and  "dairy.   You will find protein in whole grain products and some green vegetables have a little too.     When you have dairy (if you can - many people are lactose intolerant) try to make it low fat.    Ideas are 1% milk, lowfat yogurt or cheeses, low fat cottage cheese.   I don't generally recommend FAT FREE because they often contain artificial products to improve taste, and the fat helps hold down your appetite.   If you are lactose intolerant, try to see if taking Lactaid before having dairy helps.      Fresh fruits and vegetables are VERY important.  The brighter the better.   Many vegetables are considered \"Free Foods\" - meaning you can eat as much as you want, and it does not matter.  These include tomatoes, cucumbers, celery, peppers, all the various lettuces and kale - to name a few.   Potatoes, corn and peas are starchy, so do have more calories, but are still healthy - you just want to watch the amount of them you eat.       Fruits are full of wonderful nutrition.   They contain natural sugar called fructose, so eating them in moderation is best.   Diabetics may need to pay careful attention to how their body reacts to the sugar.  Some fruits might drastically increase their blood sugar.      Eating smaller meals with a couple of small snacks is better for your metabolism than not eating for long amounts of time  (breakfast is very important).   Trying to avoid large meals is helpful too.    Eating like this helps keep your appetite down and keeps you in burning metabolism rather than storage metabolism so your body will use the calories you eat.       I do not tell people to stop eating sweets or snack foods - just limit the amounts you have.  The less the better.   Pay attention to serving sizes, and treat them as a treat.        Foods like doughnuts, pop tarts, sugar cereals, cookies  ARE NOT GOOD FOR BREAKFAST.   They are loaded with sugar and will cause you to be hungrier in the day and often not feel " well.    Caffeine needs to be limited - no more than 2 servings a day.  Some people can't have any at all.    (if you have any sleep or anxiety issues - stop the caffeine)   Coffee, many teas, many sodas, energy drinks, almost any diet supplement,  and chocolate all contain caffeine.      Water is important.   For most people, 8   x  8 ounces  a day are needed.  This may vary for some health issues.    If you need to be on a low sodium diet, that means looking at labels and eating only 1000 - 2000 mg of sodium a day.    Calcium intake is important.  3 servings of a high calcium food or drink a day is recommended.   This is usually a cup of milk, a cup of yogurt, an ounce of a hard cheese or 1.5 ounces of a soft cheese are the usual servings.   There are other high calcium foods - including soy or almond milk, broccoli,  almonds, dark green leafy vegetable.   Make sure you are not getting more than 1000  - 1200 mg of total calcium a day (unless you have been told you need more by a doctor).    Vitamin D 3 is important to absorb the calcium and for your immune system.   For children, 400 IU a day is recommended.   For adults - 800 - 5000 IU a day  is recommended.  (Often the amount needed is individualized for adults - be sure to ask how much is right for you)    Physical activity is very important for good health.    Finding activities that give you regular exercise is very important for good health.  Try to find exercise you enjoy doing on a regular basis.    30 minutes at least 5 days a week of a good cardiovascular exercise is recommended.   That means something that gets your heart rate going faster than your usual baseline and you can find yourself breathing harder than usual while you are exercising.  If you have not done any exercise in a long time,  make sure you ask if its safe for you to start,  and be sure to gradually work up to your goal.      If you need to lose weight,  following these recommendations  will help you.   And if you are doing all of this and still not losing weight, then its likely just the amount of food you are eating.   Learn to cut back on portion sizes.  Using smaller plates may help.  Healthy weight loss is  only about a pound a week.   You have to remember that whatever you do to lose the weight, you must be prepared to keep it up for life for the weight to stay off.     A lot of people have a lot of luck with using something like a fit bit,  or a program where you keep track of all of your calories that you eat and what you burn off in the day.        The mediterranean diet is the most heart healthy

## 2025-07-09 NOTE — PROGRESS NOTES
"Patient: Yosvany Chase  : 1950  PCP: Mirlande Low MD  MRN: [id number]  Program: Transitional Care Management  Status: Enrolled  Effective Dates: 2025 - present  Responsible Staff: Cyndee Helton  Social Drivers to be Addressed: Alcohol Use, Physical Activity, Social Connections, Stress         Yosvany Chase is a 74 y.o. male presenting today for follow-up after being discharged from the hospital 10 days ago. The main problem requiring admission was CABG . The discharge summary and/or Transitional Care Management documentation was reviewed. Medication reconciliation was performed as indicated via the \"Tyler as Reviewed\" timestamp.     Yosvany Chase was contacted by Transitional Care Management services two days after his discharge. This encounter and supporting documentation was reviewed.    Per discharge Summary :  \"  Hospital Course  Yosvany Chase is a 74 y.o. male, with a PMH of hyperlipidemia, GERD, lumbar spine stenosis, phlebitis of leg, dermatitis, left subclavian stenosis and elevated CAC score (1408 2025) followed by + Nuclear stress test.      25 OPERATION/PROCEDURE:   #1 EVH Right Saphenous Vein  #2 CABG x 3, LIMA to LAD 30ml/3.0 PI, SVG to PDA sequenced to OM 63 ml/3.3 PI  #3 Left Atrial Appendage Ligation w/ 45mm AtriClip with Mei Herr MD     CTICU Course: Uneventful     Transferred to T3 on 25        Floor Course:  - Patient was diuresed for fluid volume overload post cardiac surgery; Preop weight: 91.4 kg, discharge wt: 90.9kg  - On ASA, statin, BB by discharge  - Epicardial wires CUT on   - had postop afib; amio and BB; last   - Telemetry at discharge: SR 60s-70s  - 2v CXR done 25  - Cardiac rehab referral was placed  - PT recs low intensity therapy; ordered wheeled walker  - Anticipate discharge to home with homecare     Discharged with home care RN and PT/OT on Monday, 2025; POD#6   On day of discharge, vital signs were " "stable and no acute distress was noted. All questions were answered. After VS and labs were reviewed it was determined the patient was stable for discharge. \"      As of today :      Meds reviewed -   CV meds  -   ASA 81 mg daily   Amiodarone 200 mg daily   Furosemide 20 mg daily for 10 days       With potassium   Metoprolol 25 mg BID   Pravastatin 80 mg daily     Also on Iron  - HBG at discharge 9.1    - will take it for a month    Has cardiology appt today and tomorrow     Home health care came out -     He is feeling very good -   Chest is tender  and leg mildly sore   Has not needed pain meds at all since 2 days after surgery   No issues breathing   Walking   - 6 min the other day  - did well   Does not know about cardiac rehab - likely interested         Review of Systems    /52 (BP Location: Right arm, Patient Position: Sitting, BP Cuff Size: Large adult)   Pulse 72   Wt 91.2 kg (201 lb)   SpO2 98%   BMI 31.48 kg/m²     Physical Exam  Vitals reviewed.   Constitutional:       General: He is not in acute distress.     Appearance: Normal appearance.   HENT:      Head: Normocephalic and atraumatic.      Nose: Nose normal.      Mouth/Throat:      Mouth: Mucous membranes are moist.      Pharynx: No posterior oropharyngeal erythema.   Eyes:      Extraocular Movements: Extraocular movements intact.      Conjunctiva/sclera: Conjunctivae normal.      Pupils: Pupils are equal, round, and reactive to light.   Cardiovascular:      Rate and Rhythm: Normal rate and regular rhythm.      Heart sounds: Normal heart sounds. No murmur heard.  Pulmonary:      Effort: Pulmonary effort is normal. No respiratory distress.      Breath sounds: Normal breath sounds. No wheezing.   Musculoskeletal:      Cervical back: No rigidity.      Comments: Chest wall very tender    Lymphadenopathy:      Cervical: No cervical adenopathy.   Skin:     General: Skin is warm and dry.      Findings: No rash.   Neurological:      General: No " focal deficit present.      Mental Status: He is alert. Mental status is at baseline.   Psychiatric:         Mood and Affect: Mood normal.         Thought Content: Thought content normal.         The complexity of medical decision making for this patient's transitional care is moderate.    Assessment/Plan   Assessment & Plan  S/P CABG x 3    Doing very well   Explained meds  Urged cardiac rehab   Has follow up with cardiology and CT surg  Postoperative atrial fibrillation (Multi)    On amiodarone now -   NSR in office today       Doing very well -   To keep Oct appt    Discussed bone healing too     We discussed at visit any disease processes that were of concern as well as the risks, benefits and instructions of any new medication provided.    See orders and discussion section for information provided to patient in their After Visit Summary.   Patient (and/or caretaker of patient if present)  stated all questions were answered, and they voiced understanding of instructions.

## 2025-07-09 NOTE — PROGRESS NOTES
Referred by Dr. Pandya ref. provider found for elevated CAC score      History Of Present Illness:    Mr. Chase is a very pleasant 74 year old gentleman with a history CAD, HLD and PAF, he is here for a hospital follow up visit. The patient is seen in collaboration with Dr. Pringle. Mr. Chase had a CABG x3 JAZMIN ligation and postop atrial fibrillation on 6/24/2025. He complains of general fatigue, and has noticed his heart rate is slower. States he falls asleep easily. States he has been trying to walk. Denies chest pain, shortness of breath or heart palpitations.       Review of Systems   Constitutional: Positive for malaise/fatigue.   HENT: Negative.     Eyes: Negative.    Cardiovascular: Negative.    Respiratory: Negative.     Endocrine: Negative.    Hematologic/Lymphatic: Negative.    Skin: Negative.    Musculoskeletal:  Positive for muscle weakness.   Gastrointestinal: Negative.    Neurological: Negative.    Psychiatric/Behavioral: Negative.        Past Medical History:    He has a past medical history of Agatston CAC score, >400 (02/05/2025), Atherosclerosis of native coronary artery of native heart, unspecified whether angina present, Cardiology follow-up encounter, Diverticulosis, sigmoid, H/O echocardiogram (05/28/2025), History of cardiovascular stress test (05/08/2025), Hyperlipidemia, Obesity, S/P cardiac cath (05/23/2025), and Vitamin D deficiency.    Past Surgical History:  He has a past surgical history that includes Rotator cuff repair (Right); Colonoscopy; Cardiac catheterization (N/A, 05/23/2025); and Coronary artery bypass graft (06/24/2025).      Social History:  He reports that he has never smoked. He has never used smokeless tobacco. He reports that he does not drink alcohol and does not use drugs.    Family History:  Family History[1]     Allergies:  Crestor [rosuvastatin] and Vicodin [hydrocodone-acetaminophen]    Outpatient Medications:  Current Outpatient Medications   Medication Instructions     acetaminophen (TYLENOL) 650 mg, oral, Every 6 hours PRN    amiodarone (PACERONE) 200 mg, oral, Daily, For 30 days.    aspirin 81 mg, oral, Daily    cholecalciferol (Vitamin D-3) 50 mcg (2,000 unit) capsule 1 capsule, Daily    docusate sodium (COLACE) 100 mg, oral, 2 times daily PRN    iron polysaccharides (NU-IRON,NIFEREX) 150 mg, oral, Daily    magnesium oxide (MAG-OX) 400 mg, oral, Daily    metoprolol tartrate (LOPRESSOR) 25 mg, oral, 2 times daily    multivitamin with minerals tablet 1 tablet, Daily    polyethylene glycol (GLYCOLAX, MIRALAX) 17 g, oral, Daily PRN    pravastatin (PRAVACHOL) 80 mg, oral, Nightly        Last Recorded Vitals:  Vitals:    07/09/25 1433   BP: (!) 114/7   SpO2: 97%   Weight: 90.6 kg (199 lb 11.8 oz)         Physical Exam:  Constitutional: Pleasant, Awake/Alert/Oriented to person place and time.   Head: Atraumatic, Normocephalic  Eyes: EOMI. ANIA  Neck: No JVD  Cardiovascular: Regular rate and rhythm, S1, S2. No extra heart sounds or murmurs  Respiratory: Clear to auscultation bilaterally. No wheezing, rales or rhonchi.   Abdomen: Soft, Nontender. Bowel sounds appreciated  Musculoskeletal: ROM intact. Muscle strength grossly intact upper and lower extremities 5/5.   Neurological: CNII-XII intact. Sensation grossly intact  Extremities: Warm and dry. No acute rashes and lesions  Psychiatric: Appropriate mood and affect       Last Labs:  CBC -  Lab Results   Component Value Date    WBC 7.8 06/30/2025    HGB 9.1 (L) 06/30/2025    HCT 27.8 (L) 06/30/2025    MCV 94 06/30/2025     06/30/2025       CMP -  Lab Results   Component Value Date    CALCIUM 8.8 06/30/2025    PHOS 3.5 06/30/2025    PROT 6.9 06/20/2025    ALBUMIN 3.1 (L) 06/30/2025    AST 18 06/20/2025    ALT 15 06/20/2025    ALKPHOS 65 06/20/2025    BILITOT 0.6 06/20/2025       LIPID PANEL -   Lab Results   Component Value Date    CHOL 237 (H) 10/07/2024    TRIG 155 (H) 10/07/2024    HDL 45.0 10/07/2024    CHHDL 5.3 10/07/2024     LDLF 163 (H) 10/28/2019    VLDL 31 10/07/2024    NHDL 192 (H) 10/07/2024       RENAL FUNCTION PANEL -   Lab Results   Component Value Date    GLUCOSE 97 06/30/2025     06/30/2025    K 3.8 06/30/2025     06/30/2025    CO2 25 06/30/2025    ANIONGAP 12 06/30/2025    BUN 17 06/30/2025    CREATININE 0.69 06/30/2025    CALCIUM 8.8 06/30/2025    PHOS 3.5 06/30/2025    ALBUMIN 3.1 (L) 06/30/2025        Lab Results   Component Value Date    HGBA1C 5.2 06/20/2025       Last Cardiology Tests:  ECG:      Cardiac Imaging:  LULY 6/24/2025  1. The left ventricular systolic function is normal with a visually estimated ejection fraction of 55-60%.   2. There is normal right ventricular global systolic function.   3. Mildly enlarged right ventricle.     POST PROCEDURE REPORT:  Patient has a normal sinus rhythm. Patient is on an epinephrine drip. Global LV function is normal. There is no aortic regurgitation. There is trace mitral regurgitation. There is trace tricuspid regurgitation. No evidence of post aortic cannulation dissection. All transesophageal echocardiogram findings discussed with surgeon. S/P CABG x3.  Normal size and function.  Dilated RV with normal RV function.  Aorta is intact after decannulation.  Findings discussed with the Surgeon.    Echocardiogram 5/28/2025  1. Left ventricular ejection fraction is normal calculated by Noriega's biplane at 70%.   2. Spectral Doppler shows a Grade I (impaired relaxation pattern) of left ventricular diastolic filling with normal left atrial filling pressure.   3. Poorly visualized anatomical structures due to suboptimal image quality.   4. There is normal right ventricular global systolic function.    Heart cath 5/23/2025  1. Left main: no significant angiographic disease.   2. LAD: 70% diffuse ostial-proxiaml stenosis, 70% mid-vessel disease.   3. LCx: 30% proximal disease, 50-60% mid-vessel disease.   4. RCA: 80% proximal disease, 95% mid-vessel stenosis.   5. LVEDP  11mmHg, no aortic stenosis on LV-Ao gradient.     Nuclear stress test 5/8/2025   1. Note is made of a partially fixed defect along the inferior wall.  Findings are concerning for prior infarct particularly along the  basal aspect the inferior wall with moderate melina-infarct ischemia  along the mid to distal aspect.  2. The left ventricle is normal in size.  3. Decreased wall motion and thickening particularly along the basal  to mid inferior wall with an LV EF estimated at 63%.      CT cardiac scoring wo IV contrast 02/05/2025  IMPRESSION:  1. Elevated coronary artery calcium score of 1408*.          Lab review: I have personally reviewed the laboratory result(s)   Diagnostic review: I have personally reviewed the result(s) of the EKG and CAC score     Assessment/Plan   Mr. Chase is a very pleasant 74 year old gentleman with a history of an elevated CAC score (1408), and HLD, He had a CABG x3 (LIMA to LAD 30ml/3.0 PI, SVG to PDA sequenced to OM 63 ml/3.3 PI), JAZMIN, on 6/24/2025, post operatively had an episode of atrial fibrillation. Clinically in sinus rhythm. Denies chest pain or shortness of breath, has been walking. He complains of general fatigue the Metoprolol will be decreased to 12.5 mg twice a day. Heart rate and blood pressure are well controlled today        Plan:  -call with any questions   -decrease the Metoprolol to 12.5 mg twice a day   -continue Amiodarone 200 mg daily and Aspirin 81 mg daily   -continue Pravastatin 80 mg daily   -fasting lipid panel   -follow up in six months         Izzy Odonnell, KAREEM-CNP         [1]   Family History  Problem Relation Name Age of Onset    Atrial fibrillation Other      Prostate cancer Other

## 2025-07-09 NOTE — ASSESSMENT & PLAN NOTE
Doing very well   Explained meds  Urged cardiac rehab   Has follow up with cardiology and CT surg

## 2025-07-09 NOTE — PATIENT INSTRUCTIONS
DECREASE THE METOPROLOL TO TO A HALF A TABLET TWICE A DAY   CALL WITH ANY QUESTIONS   FOLLOW UP IN SIX MONTHS   FASTING LIPID PANEL IN SIX MONTHS

## 2025-07-10 ENCOUNTER — HOME CARE VISIT (OUTPATIENT)
Dept: HOME HEALTH SERVICES | Facility: HOME HEALTH | Age: 75
End: 2025-07-10
Payer: MEDICARE

## 2025-07-10 ENCOUNTER — TELEMEDICINE CLINICAL SUPPORT (OUTPATIENT)
Dept: CARDIAC SURGERY | Facility: HOSPITAL | Age: 75
End: 2025-07-10
Payer: MEDICARE

## 2025-07-10 SDOH — HEALTH STABILITY: MENTAL HEALTH
NUTRITION HISTORY: PT HAS BEEN A HEALTHY EATER, EATING 3 MEALS PER DAY AT HOME.  PT STAYS AWAY FROM FRIED FOODS AND RED MEAT, NOT ADDING SALT TO FOODS.

## 2025-07-10 NOTE — PROGRESS NOTES
A telephone visit (audio only) between Yosvany Chase (at the originating site) and the provider (at the distant site) was utilized to provide this telehealth service.    Patient is having a telehealth nurse visit today following hospital discharge on June 30, 2025.    Patient is 16 days s/p CABG x 3 / JAZMIN clip with Dr. Herr.  He feels well and has no incision pain.  Sternal incision, graft sites, and chest tube sites are closed without redness or drainage.  He denies shortness of breath and is ambulating often with periods of rest as needed.   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  He is mindful of his physical restrictions and mostly walks and takes notes.  He saw Izzy Mckeon (cardiology - Dr. Pringle) yesterday and noted that his heart rate was typically in the 50s with fatigue.  Izzy decreased his metoprolol to 12.5 mg BID.  Patient is taking all medications as prescribed.    Patient has a post-op visit with Dr. Herr scheduled for July 28th  with a chest x-ray prior to the visit.  He would like to participate in cardiac rehab when permitted to regain strength and stamina.  Patient will call our office with any questions or concerns.  It was a pleasure speaking to Yosvany Chase today.

## 2025-07-11 NOTE — OP NOTE
OPERATIVE REPORT     Date: 2025  OR Location: [facility] OR    Name: Yosvany Chase, : 1950, Age: 74 y.o., MRN: [id number], Sex: male    Diagnosis  Pre-op Diagnosis      * Atherosclerosis of native coronary artery of native heart, unspecified whether angina present [I25.10]  Assessment & Plan  Atherosclerosis of native coronary artery of native heart    S/P CABG x 3    S/P left atrial appendage ligation    Postoperative atrial fibrillation (Multi)   Post-op Diagnosis     * Atherosclerosis of native coronary artery of native heart, unspecified whether angina present [I25.10]  Assessment & Plan  Atherosclerosis of native coronary artery of native heart    S/P CABG x 3    S/P left atrial appendage ligation    Postoperative atrial fibrillation (Multi)       Procedures  CABG X3 LIMA TO LAD; SVG TO OM TO PDA, LEFT ATRIAL APPENDAGE LIGATION USING ATRICLIP [id number]  [id number] - SC CABG W/ARTERIAL GRAFT THREE ARTERIAL GRAFTS      CABG x 3, LIMA to LAD; SVG to PDA sequenced to OM   Left Atrial Appendage Ligation w/ 45mm AtriClip  Endoscopic harvest of the right greater saphenous vein     Surgeons   Mei Herr MD      Resident/Fellow/Other Assistant:  Surgeons and Role:     * Lanie Tyson MD - Assisting  There was no qualified resident available to assist with the case and he assisted with all the key portions of the case.       Dionicio PEREZ     The SA/RNFA/PA was scrubbed throughout the procedure and assisted with handling and retracting tissues as well as conduit harvest and sternal closure where applicable     Procedure Summary  Anesthesia: General  ASA: IV  Anesthesia Staff:   Anesthesiologist: Anupama Phan MD  CRNA: KAREEM Weaver-CRNA  C-AA: JARRED Reynaga  Perfusionist: Yessica Orellana    Estimated Blood Loss: 250mL    Specimen: No specimens collected     Staff:   Circulator: Ladi Arzate RN; Catherine Monroe RN  Relief Scrub: Marvin Varma RN  Scrub  Person: Ena Paige        Findings:   LIMA to LAD 30ml/3.0 PI,   SVG to PDA sequenced to OM 63 ml/3.3 PI    Cardio Pulmonary Bypass Time: 86 min  Cross-clamp Time: 67 min    Indications: Yosvany Chase is an 74 y.o. male with hx of coronary artery disease with plan for cabg    The risks benefits and alternatives were discussed with the patient and include but are not limited to, bleeding, infection, injury to other structures, need for re-operation, arrhythmia, respiratory failure, prolonged intubation, stroke, and death.  These were discussed with the patient in detail, all questions were answered and informed consent was obtained.      Procedure Details: The patient was taken to the operating room by anesthesia, placed supine on the operating table, intubated and placed under general anesthesia.  A central line and abbie were placed. LULY was performed which confirmed the preoperative findings.  The patient was prepped and draped in the usual sterile fashion.  A time out was performed. The sternotomy was made in the usual fashion.  The left internal mammary artery was taken down in a skeletonized fashion. The saphenous vein graft was harvested endoscopically at the same time. Heparin was administered and the LIMA was divided distally.  It was noted to have excellent pulsatile flow.  Papaverine was applied and the LIMA was wrapped in a papaverine soaked gauze.  The chest retractor was placed, and the pericardium was opened. The aorta and right atrium were cannulated.  Antegrade and retrograde cannulas were placed.  The mammmary and vein were prepared.  The patient was placed on bypass.  Targets were inspected and noted to be of good quality.  The cross clamp was applied, the heart was arrested with antegrade cardioplegia followed by retrograde cardioplegia. Cardioplegia was administered at 15 minute intervals throughout the case.   The heart was lifted and a 45mm AtriClip was applied to the left atrial  appendage. We began with the PDA which was opened and noted to be of good caliber.  The vein was anastomosed with a running 7-0 prolene suture. Cardioplegia was given down the vein and in a retrograde fashion.  We then turend our attention to the lateral wall.  The OM was identified and exposed.  It was opened and noted to be of good caliber.  The vein was brought around to the left of the heart and was anastomosed in sequence using a running 7-0 prolene suture in a side to side fashion. Cardioplegia was given down the vein and in a retrograde fashion.   We then turned our attention to the LAD, this was opened and the lima was anastomosed using a running 7-0 prolene suture.  The proximal anastomosis was then completed with running 6-0 prolene suture off the ascending aorta.  The heart was de-aired, the cross clamp was removed.  The heart started beating immediately.   Atrial and ventricular wires were placed.  The patient was weaned easily from bypass. All grafts were measured with the flow probe and noted to have excellent flow. Protamine was administered.  All cannulas were removed and sites were oversewn with 4-0 prolene.  Hemostasis was secured.  Chest tubes were placed.  The sternum was closed with wires.  The subcutaneous tissue was closed in layers with running vicryl sutures.  The skin was closed with running vicryl suture and covered with dermabond.  The patient tolerated the procedure well.  All instrument, needle and sponge counts were correct at the end of the case.  The patient was left intubated and transferred to the ICU in stable but critical condition.            Mei Herr MD  Cardiac Surgery  07/11/25  1:04 PM             5

## 2025-07-12 ASSESSMENT — ENCOUNTER SYMPTOMS
HEMATOLOGIC/LYMPHATIC NEGATIVE: 1
GASTROINTESTINAL NEGATIVE: 1
EYES NEGATIVE: 1
CARDIOVASCULAR NEGATIVE: 1
NEUROLOGICAL NEGATIVE: 1
RESPIRATORY NEGATIVE: 1
PSYCHIATRIC NEGATIVE: 1
ENDOCRINE NEGATIVE: 1

## 2025-07-14 ENCOUNTER — HOME CARE VISIT (OUTPATIENT)
Dept: HOME HEALTH SERVICES | Facility: HOME HEALTH | Age: 75
End: 2025-07-14
Payer: MEDICARE

## 2025-07-14 ENCOUNTER — TELEPHONE (OUTPATIENT)
Dept: CARDIOLOGY | Facility: HOSPITAL | Age: 75
End: 2025-07-14
Payer: MEDICARE

## 2025-07-18 ENCOUNTER — PATIENT OUTREACH (OUTPATIENT)
Dept: PRIMARY CARE | Facility: CLINIC | Age: 75
End: 2025-07-18
Payer: MEDICARE

## 2025-07-18 DIAGNOSIS — Z95.1 S/P CABG (CORONARY ARTERY BYPASS GRAFT): ICD-10-CM

## 2025-07-18 NOTE — PROGRESS NOTES
TCM 14 day outreach completed successfully. Confirmation of at least 2 patient identifiers.    Completed telephonic follow-up with patient after recent visit with PCP    Spoke to patient during outreach call.    Patient reports feeling: Improved    Patient has questions or concerns about medications: No    Have all prescribed medications been filled? Yes, discussed refills of pravastatin- encouraged him to reach out via [facility] for any refills needed and who to contact.     Patient has necessary resources to manage their care? Yes    Patient has questions or concerns? No    Next care management follow-up approximately within one month.  Care  information provided to patient.

## 2025-07-19 RX ORDER — PRAVASTATIN SODIUM 80 MG/1
80 TABLET ORAL NIGHTLY
Qty: 30 TABLET | Refills: 11 | Status: SHIPPED | OUTPATIENT
Start: 2025-07-19 | End: 2026-07-14

## 2025-07-20 ASSESSMENT — ACTIVITIES OF DAILY LIVING (ADL)
HOME_HEALTH_OASIS: 00
OASIS_M1830: 00

## 2025-07-20 ASSESSMENT — PATIENT HEALTH QUESTIONNAIRE - PHQ9: PATIENT UNABLE TO COMPLETE PHQ: NO VISIT MADE

## 2025-07-20 NOTE — CASE COMMUNICATION
DISCHARGE SUMMARY:    DISCIPLINE: Nursing  DATE OF DISCIPLINE DISCHARGE:7 14 2025  REASON FOR DISCHARGE: GOALS MET  COORDINATION NOTE:   EVALUATION OF GOALS:   SUMMARY OF CARE PROVIDED: comprehensive nursing assesment medication instruction pain assessment and management wound assessment and care home safety instruction  DISCHARGE INSTRUCTIONS GIVEN:yes  SERVICES REMAINING: none  NOMNC OBTAINED: yes

## 2025-07-21 NOTE — PROGRESS NOTES
[facility]      Chief Complaint:  Post-op evaluation    HPI:    Reviewed at the clinic today.  Patient underwent CABG x 3 and JAZMIN on 6/24/25.     Patient has resumed normal activities and appetite has returned to normal.  No chest pain, no shortness of breath and denies palpitations, dizziness, or syncope.      On examination, wounds have soundly healed.       Chest x-ray today demonstrated ***       Assessment/Plan:      Referral in place for cardiac rehab  Ok to drive, ok to return to normal activities   Continue to follow up in the long term with cardiology   Does not need to return to clinic, but was instructed to call if any issues arise       ____________________________________________________________  Mei Herr MD  Assistant Professor of Surgery  Division of Cardiac Surgery    [facility]  [facility]

## 2025-07-28 ENCOUNTER — APPOINTMENT (OUTPATIENT)
Dept: CARDIAC SURGERY | Facility: HOSPITAL | Age: 75
End: 2025-07-28
Payer: MEDICARE

## 2025-07-29 NOTE — PROGRESS NOTES
[facility]      Chief Complaint:  Post-op evaluation    HPI:    Reviewed at the clinic today.  Patient underwent CABG x 3 and JAZMIN on 6/24/25.     Patient has resumed normal activities and appetite has returned to normal.  No chest pain, no shortness of breath and denies palpitations, dizziness, or syncope.      On examination, wounds have soundly healed.       Chest x-ray today demonstrated clear lung fields bilaterally        Assessment/Plan:      Referral in place for cardiac rehab  Ok to drive, ok to return to normal activities   Continue to follow up in the long term with cardiology   Does not need to return to clinic, but was instructed to call if any issues arise       ____________________________________________________________  Mei Herr MD  Assistant Professor of Surgery  Division of Cardiac Surgery    [facility]  [facility]

## 2025-07-30 ENCOUNTER — TELEPHONE (OUTPATIENT)
Dept: CARDIOLOGY | Facility: HOSPITAL | Age: 75
End: 2025-07-30
Payer: MEDICARE

## 2025-07-31 ENCOUNTER — TELEPHONE (OUTPATIENT)
Dept: CARDIAC SURGERY | Facility: HOSPITAL | Age: 75
End: 2025-07-31
Payer: MEDICARE

## 2025-07-31 NOTE — TELEPHONE ENCOUNTER
Patient called inquiring if amiodarone can be stopped.  He stated he has felt no recurrence of a-fib.  Discussed with Dr. Herr who advised patient can stop amiodarone.  Patient verbalized understanding and is scheduled for post-op visit on 8/4.

## 2025-08-04 ENCOUNTER — OFFICE VISIT (OUTPATIENT)
Dept: CARDIAC SURGERY | Facility: HOSPITAL | Age: 75
End: 2025-08-04
Payer: MEDICARE

## 2025-08-04 ENCOUNTER — HOSPITAL ENCOUNTER (OUTPATIENT)
Dept: RADIOLOGY | Facility: HOSPITAL | Age: 75
Discharge: HOME | End: 2025-08-04
Payer: MEDICARE

## 2025-08-04 VITALS
WEIGHT: 198.5 LBS | HEART RATE: 77 BPM | OXYGEN SATURATION: 97 % | BODY MASS INDEX: 31.9 KG/M2 | SYSTOLIC BLOOD PRESSURE: 128 MMHG | HEIGHT: 66 IN | DIASTOLIC BLOOD PRESSURE: 72 MMHG

## 2025-08-04 DIAGNOSIS — I25.10 ATHEROSCLEROSIS OF NATIVE CORONARY ARTERY OF NATIVE HEART, UNSPECIFIED WHETHER ANGINA PRESENT: Primary | ICD-10-CM

## 2025-08-04 DIAGNOSIS — I25.10 ATHEROSCLEROSIS OF NATIVE CORONARY ARTERY OF NATIVE HEART, UNSPECIFIED WHETHER ANGINA PRESENT: ICD-10-CM

## 2025-08-04 PROCEDURE — 99024 POSTOP FOLLOW-UP VISIT: CPT | Performed by: STUDENT IN AN ORGANIZED HEALTH CARE EDUCATION/TRAINING PROGRAM

## 2025-08-04 PROCEDURE — 3008F BODY MASS INDEX DOCD: CPT | Performed by: STUDENT IN AN ORGANIZED HEALTH CARE EDUCATION/TRAINING PROGRAM

## 2025-08-04 PROCEDURE — 99202 OFFICE O/P NEW SF 15 MIN: CPT

## 2025-08-04 PROCEDURE — 1126F AMNT PAIN NOTED NONE PRSNT: CPT | Performed by: STUDENT IN AN ORGANIZED HEALTH CARE EDUCATION/TRAINING PROGRAM

## 2025-08-04 PROCEDURE — 71046 X-RAY EXAM CHEST 2 VIEWS: CPT | Performed by: RADIOLOGY

## 2025-08-04 PROCEDURE — 1159F MED LIST DOCD IN RCRD: CPT | Performed by: STUDENT IN AN ORGANIZED HEALTH CARE EDUCATION/TRAINING PROGRAM

## 2025-08-04 PROCEDURE — 71046 X-RAY EXAM CHEST 2 VIEWS: CPT

## 2025-08-04 ASSESSMENT — PAIN SCALES - GENERAL: PAINLEVEL_OUTOF10: 0-NO PAIN

## 2025-08-07 ENCOUNTER — PATIENT OUTREACH (OUTPATIENT)
Dept: PRIMARY CARE | Facility: CLINIC | Age: 75
End: 2025-08-07
Payer: MEDICARE

## 2025-08-22 ENCOUNTER — CLINICAL SUPPORT (OUTPATIENT)
Dept: CARDIAC REHAB | Facility: HOSPITAL | Age: 75
End: 2025-08-22
Payer: MEDICARE

## 2025-08-22 ENCOUNTER — DOCUMENTATION (OUTPATIENT)
Dept: CARDIAC REHAB | Facility: HOSPITAL | Age: 75
End: 2025-08-22

## 2025-08-22 DIAGNOSIS — Z95.1 STATUS POST CORONARY ARTERY BYPASS GRAFT: Primary | ICD-10-CM

## 2025-08-22 DIAGNOSIS — Z95.1 S/P CABG (CORONARY ARTERY BYPASS GRAFT): ICD-10-CM

## 2025-08-22 RX ORDER — METOPROLOL TARTRATE 25 MG/1
12.5 TABLET, FILM COATED ORAL 2 TIMES DAILY
Qty: 90 TABLET | Refills: 1 | Status: SHIPPED | OUTPATIENT
Start: 2025-08-22

## 2025-08-25 ENCOUNTER — HOSPITAL ENCOUNTER (OUTPATIENT)
Dept: CARDIOLOGY | Facility: HOSPITAL | Age: 75
Discharge: HOME | End: 2025-08-25
Payer: MEDICARE

## 2025-08-25 DIAGNOSIS — Z95.1 STATUS POST CORONARY ARTERY BYPASS GRAFT: ICD-10-CM

## 2025-08-25 PROCEDURE — 93018 CV STRESS TEST I&R ONLY: CPT | Performed by: STUDENT IN AN ORGANIZED HEALTH CARE EDUCATION/TRAINING PROGRAM

## 2025-08-25 PROCEDURE — 93016 CV STRESS TEST SUPVJ ONLY: CPT | Performed by: STUDENT IN AN ORGANIZED HEALTH CARE EDUCATION/TRAINING PROGRAM

## 2025-08-25 PROCEDURE — 93017 CV STRESS TEST TRACING ONLY: CPT

## 2025-08-27 ENCOUNTER — CLINICAL SUPPORT (OUTPATIENT)
Dept: CARDIAC REHAB | Facility: HOSPITAL | Age: 75
End: 2025-08-27
Payer: MEDICARE

## 2025-08-27 DIAGNOSIS — Z95.1 STATUS POST CORONARY ARTERY BYPASS GRAFT: ICD-10-CM

## 2025-08-27 PROCEDURE — 93798 PHYS/QHP OP CAR RHAB W/ECG: CPT

## 2025-08-29 ENCOUNTER — CLINICAL SUPPORT (OUTPATIENT)
Dept: CARDIAC REHAB | Facility: HOSPITAL | Age: 75
End: 2025-08-29
Payer: MEDICARE

## 2025-08-29 DIAGNOSIS — Z95.1 STATUS POST CORONARY ARTERY BYPASS GRAFT: ICD-10-CM

## 2025-08-29 PROCEDURE — 93798 PHYS/QHP OP CAR RHAB W/ECG: CPT

## 2025-10-06 ENCOUNTER — APPOINTMENT (OUTPATIENT)
Dept: PRIMARY CARE | Facility: CLINIC | Age: 75
End: 2025-10-06
Payer: COMMERCIAL

## (undated) DEVICE — CONNECTOR, STRAIGHT, 0.5 X 0.5 IN

## (undated) DEVICE — PACING CABLE, EXTENSION, 12 FT BLUE, DISPOSABLE

## (undated) DEVICE — ADAPTER, CARDIOPLEGIA, VENTING, Y, 19.1 CM

## (undated) DEVICE — RETRACTOR, SUTURE, HOLDING, INSERT, OCTOBASE, DISPOSABLE

## (undated) DEVICE — GUIDEWIRE, INQUIRE, J TIP, .035 X 210CM, FIXED CORE, DIAGNOSTIC

## (undated) DEVICE — BLADE, SAW STERNUM, STERILE

## (undated) DEVICE — KIT, CELL SAVER, W/COLLECTION SET, 225ML WASH SET

## (undated) DEVICE — CLIP, LIGATING, W/ADHESIVE PAD, MEDIUM, TITANIUM

## (undated) DEVICE — SUTURE, PROLENE, 3-0, 36 IN, SH, DA, BLUE

## (undated) DEVICE — FILTER, IV, BLOOD, MICROAGGREGATE, 40 MIC, RBC TRANSFUSION

## (undated) DEVICE — INSERT, CLAMP, SURGICAL, SOFT/TRACTION, STEALTH, 5 MM

## (undated) DEVICE — MANIFOLD, 4 PORT NEPTUNE STANDARD

## (undated) DEVICE — NEEDLE, ENTRY, PERCUTANEOUS, 21 G X 2.5 CM

## (undated) DEVICE — SUTURE, PROLENE, 4-0, 36 IN, BB, BLUE

## (undated) DEVICE — SUTURE, PROLENE, 6-0, 30 IN, RB-2, BLUE

## (undated) DEVICE — SUTURE, PROLENE, 7-0, 30 IN, BV1, DA, BLUE

## (undated) DEVICE — TUBE SET, PNEUMOCLEAR, SMOKE EVACU, HIGH-FLOW

## (undated) DEVICE — MARKER, SKIN, RULER AND LABEL PACK, CUSTOM

## (undated) DEVICE — Device

## (undated) DEVICE — SENSOR, O3 REGIONAL, LARGE

## (undated) DEVICE — DRESSING, ISLAND, TELFA, 4 X 5 IN

## (undated) DEVICE — YANKAUER, RIGID, STRAIGHT, OPEN TIP, NO VENT

## (undated) DEVICE — SUTURE, PROLENE, 5-0, 36 IN, C-1, CV-11, BLUE

## (undated) DEVICE — TR BAND, RADIAL COMPRESSION, STANDARD, 24CM

## (undated) DEVICE — CLIP, LIGATING, W/ADHESIVE, WIDE SLOT, SMALL, TITANIUM

## (undated) DEVICE — DRAPE, SHEET, CARDIOVASCULAR, ANTIMICROBIAL, W/ANESTHESIA SCREEN, IOBAN 2, STERI DRAPE, 107 X 133 IN, DISPOSABLE, FABRIC, BLUE, STERILE

## (undated) DEVICE — SUTURE, MONOCRYL, 3-0, 18 IN, PS2, UNDYED

## (undated) DEVICE — PITCHER, GRADUATE, 32 OZ (1200CC), STERILE

## (undated) DEVICE — CLOSURE SYSTEM, DERMABOND, PRINEO, 42CM, STERILE

## (undated) DEVICE — DRESSING, ADHESIVE, ISLAND, TELFA, 2 X 3.75 IN, LF

## (undated) DEVICE — KIT, BLOWER / MISTER II

## (undated) DEVICE — DRAPE, SHEET, UTILITY, NON ABSORBENT, 18 X 26 IN, LF

## (undated) DEVICE — SYRINGE, LUER LOCK, 12ML

## (undated) DEVICE — SUTURE, PROLENE 4-0, TAPER POINT, SH-1 BLUE 30 INCH

## (undated) DEVICE — GOWN, SURGICAL, SMARTGOWN, XLARGE, STERILE

## (undated) DEVICE — COLLECTION UNIT, DRAINAGE, THORACIC, SINGLE TUBE, DRY SUCTION, ATS COMPATIBLE, OASIS 3600, LF

## (undated) DEVICE — CATHETER, DRAINAGE, NASOGASTRIC, DOUBLE LUMEN, FUNNEL END, SUMP, SALEM, 18 FR, 48 IN, PVC, STERILE

## (undated) DEVICE — DRESSING, ADHESIVE, ISLAND, TELFA, 4 X 14 IN

## (undated) DEVICE — DRAIN, CHANNEL, BLAKE, HUBLESS, ROUND, 28FR

## (undated) DEVICE — SPONGE, HEMOSTATIC, GELATIN, SURGIFOAM, 8 X 12.5 CM X 10 MM

## (undated) DEVICE — CASSETTE, BLOOD, PLEGIC SET

## (undated) DEVICE — SUTURE, PROLENE, 5-0, 36 IN, RB1, DA, BLUE

## (undated) DEVICE — ANGIO KIT, LEFT HEART, LF, CUSTOM

## (undated) DEVICE — CLIPPER, SURGICAL BLADE ASSEMBLY, GENERAL PURPOSE, SINGLE USE

## (undated) DEVICE — SUTURE, PROLENE, 4-0, 36 IN, RB1, DA, BLUE

## (undated) DEVICE — DRAPE, INCISE, ANTIMICROBIAL, IOBAN 2, LARGE, 17 X 23 IN, DISPOSABLE, STERILE

## (undated) DEVICE — ELECTRODE, ELECTROSURGICAL, BLADE, INSULATED, ENT/IMA, STERILE

## (undated) DEVICE — CATHETER, OPTITORQUE, 5FR, TIG1, 1H/100CM

## (undated) DEVICE — DRESSING, MEPILEX, BORDER, SACRUM, 8.7 X 9.8 IN

## (undated) DEVICE — KIT, TOURNIQUET, 7"

## (undated) DEVICE — CATHETER, ANGIO, IMPULSE, FR4, 6 FR X 100 CM

## (undated) DEVICE — SUTURE, VICRYL, 2-0, 27 IN, CT-1, VIOLET

## (undated) DEVICE — COVER, CART, 45 X 27 X 48 IN, CLEAR

## (undated) DEVICE — SUTURE, ETHIBOND, XTRA, 30 IN, 0, CT-1, GREEN

## (undated) DEVICE — ELECTRODE, QUICK-COMBO, EDGE SYSTEM, REDI PACK

## (undated) DEVICE — TUBING PACK, OXYGENATOR, ADULT

## (undated) DEVICE — CANNULA, RETROGRADE, 14FR X 32CM

## (undated) DEVICE — PUNCH, AORTIC 4MM

## (undated) DEVICE — SEALANT, HEMOSTATIC, FLOSEAL, 10 ML

## (undated) DEVICE — OXYGENATOR FX 25, W/HR, ARTERIAL FILTER

## (undated) DEVICE — MICROCOAGULATION TEST, ACT+ TEST CUVETTE

## (undated) DEVICE — CANNULA, ARTERIAL, 20FR EOPA 3-D W/LUER, SOFTFLOW

## (undated) DEVICE — APPLICATOR, CHLORAPREP, W/ORANGE TINT, 26ML

## (undated) DEVICE — MONITORING KIT, TRANSDUCER, RETROGRADE, MPS, W/EXTENSION, LF

## (undated) DEVICE — CANNULA, VENOUS 2 STAGE 32/40

## (undated) DEVICE — INTRODUCER SHEATH, GLIDESHEATH, 6FR 10CM

## (undated) DEVICE — LEAD, PACING, MYOCARDIAL, BIPOLAR, TEMPORARY

## (undated) DEVICE — CLEANER, ELECTROSURGICAL, TIP, 5 X 5 CM, LF

## (undated) DEVICE — CANNULA, AORTIC, ROOT, STANDARD, FLANGE, RADIOPAQUE TIP, W/FLOW-GUARD, 9 FR X 14 CM

## (undated) DEVICE — TOWEL, SURGICAL, NEURO, O/R, 16 X 26, BLUE, STERILE

## (undated) DEVICE — DRESSING, MEPILEX, BORDER, HEEL, 8.7 X 9.1 IN

## (undated) DEVICE — GEL, ULTRASOUND, AQUASONIC 100, 20 GM, STERILE

## (undated) DEVICE — DRAPE, FLUID WARMER

## (undated) DEVICE — BAG, DECANTER

## (undated) DEVICE — CLIP, SPRING, BULLDOG, FOGARTY, SOFT JAW, 6 MM, LF

## (undated) DEVICE — SHUNT, SENSOR

## (undated) DEVICE — CARDIOPLEGIA SET

## (undated) DEVICE — SPONGE, HEMOSTAT, SURGICEL FIBRILLAR, ABS, 4 X 4, LF

## (undated) DEVICE — SYRINGE, 20 CC, LUER LOCK, MONOJECT, W/O CAP, LF

## (undated) DEVICE — PACING CABLE, EXTENSION, 12 FT BEIGE, DISPOSABLE

## (undated) DEVICE — TRAY, SURESTEP, URINE METER, 14FR, SILICONE

## (undated) DEVICE — KNIFE, OPHTHALMIC, SLIT, 22.5 DEG